# Patient Record
Sex: MALE | Race: WHITE | NOT HISPANIC OR LATINO | Employment: UNEMPLOYED | URBAN - NONMETROPOLITAN AREA
[De-identification: names, ages, dates, MRNs, and addresses within clinical notes are randomized per-mention and may not be internally consistent; named-entity substitution may affect disease eponyms.]

---

## 2017-02-06 RX ORDER — LISINOPRIL 5 MG/1
TABLET ORAL
Qty: 90 TABLET | Refills: 1 | Status: SHIPPED | OUTPATIENT
Start: 2017-02-06 | End: 2017-07-30 | Stop reason: SDUPTHER

## 2017-02-27 RX ORDER — SITAGLIPTIN 100 MG/1
TABLET, FILM COATED ORAL
Qty: 90 TABLET | Refills: 0 | Status: SHIPPED | OUTPATIENT
Start: 2017-02-27 | End: 2017-06-30 | Stop reason: SDUPTHER

## 2017-05-22 ENCOUNTER — OFFICE VISIT (OUTPATIENT)
Dept: FAMILY MEDICINE CLINIC | Facility: CLINIC | Age: 62
End: 2017-05-22

## 2017-05-22 VITALS
OXYGEN SATURATION: 97 % | HEART RATE: 67 BPM | DIASTOLIC BLOOD PRESSURE: 80 MMHG | TEMPERATURE: 97.3 F | WEIGHT: 265.2 LBS | BODY MASS INDEX: 37.97 KG/M2 | HEIGHT: 70 IN | SYSTOLIC BLOOD PRESSURE: 152 MMHG

## 2017-05-22 DIAGNOSIS — R10.32 LEFT LOWER QUADRANT PAIN: Primary | ICD-10-CM

## 2017-05-22 DIAGNOSIS — R35.0 FREQUENCY OF URINATION: ICD-10-CM

## 2017-05-22 DIAGNOSIS — E11.8 TYPE 2 DIABETES MELLITUS WITH COMPLICATION, WITHOUT LONG-TERM CURRENT USE OF INSULIN (HCC): ICD-10-CM

## 2017-05-22 DIAGNOSIS — I10 ESSENTIAL HYPERTENSION: ICD-10-CM

## 2017-05-22 LAB
BILIRUB BLD-MCNC: NEGATIVE MG/DL
CLARITY, POC: CLEAR
COLOR UR: YELLOW
GLUCOSE UR STRIP-MCNC: ABNORMAL MG/DL
KETONES UR QL: ABNORMAL
LEUKOCYTE EST, POC: NEGATIVE
NITRITE UR-MCNC: NEGATIVE MG/ML
PH UR: 5 [PH] (ref 5–8)
PROT UR STRIP-MCNC: ABNORMAL MG/DL
RBC # UR STRIP: NEGATIVE /UL
SP GR UR: 1.03 (ref 1–1.03)
UROBILINOGEN UR QL: NORMAL

## 2017-05-22 PROCEDURE — 81002 URINALYSIS NONAUTO W/O SCOPE: CPT | Performed by: FAMILY MEDICINE

## 2017-05-22 PROCEDURE — 90471 IMMUNIZATION ADMIN: CPT | Performed by: FAMILY MEDICINE

## 2017-05-22 PROCEDURE — 36415 COLL VENOUS BLD VENIPUNCTURE: CPT | Performed by: FAMILY MEDICINE

## 2017-05-22 PROCEDURE — 90715 TDAP VACCINE 7 YRS/> IM: CPT | Performed by: FAMILY MEDICINE

## 2017-05-22 PROCEDURE — 83036 HEMOGLOBIN GLYCOSYLATED A1C: CPT | Performed by: FAMILY MEDICINE

## 2017-05-22 PROCEDURE — 99214 OFFICE O/P EST MOD 30 MIN: CPT | Performed by: FAMILY MEDICINE

## 2017-05-23 LAB — HBA1C MFR BLD: 6.87 % (ref 4–5.6)

## 2017-05-30 ENCOUNTER — OFFICE VISIT (OUTPATIENT)
Dept: FAMILY MEDICINE CLINIC | Facility: CLINIC | Age: 62
End: 2017-05-30

## 2017-05-30 VITALS
TEMPERATURE: 98.3 F | HEIGHT: 70 IN | DIASTOLIC BLOOD PRESSURE: 64 MMHG | OXYGEN SATURATION: 98 % | HEART RATE: 51 BPM | WEIGHT: 265.2 LBS | SYSTOLIC BLOOD PRESSURE: 118 MMHG | BODY MASS INDEX: 37.97 KG/M2

## 2017-05-30 DIAGNOSIS — R81 GLUCOSURIA: ICD-10-CM

## 2017-05-30 DIAGNOSIS — E11.9 CONTROLLED TYPE 2 DIABETES MELLITUS WITHOUT COMPLICATION, WITHOUT LONG-TERM CURRENT USE OF INSULIN (HCC): Primary | ICD-10-CM

## 2017-05-30 DIAGNOSIS — R14.0 ABDOMINAL BLOATING: ICD-10-CM

## 2017-05-30 DIAGNOSIS — R81 GLUCOSURIA: Primary | ICD-10-CM

## 2017-05-30 DIAGNOSIS — R81 GLUCOSE FOUND IN URINE ON EXAMINATION: ICD-10-CM

## 2017-05-30 DIAGNOSIS — E13.9: Primary | ICD-10-CM

## 2017-05-30 LAB
BILIRUB BLD-MCNC: NEGATIVE MG/DL
CLARITY, POC: CLEAR
COLOR UR: YELLOW
GLUCOSE UR STRIP-MCNC: NEGATIVE MG/DL
KETONES UR QL: NEGATIVE
LEUKOCYTE EST, POC: NEGATIVE
NITRITE UR-MCNC: NEGATIVE MG/ML
PH UR: 5 [PH] (ref 5–8)
PROT UR STRIP-MCNC: NEGATIVE MG/DL
RBC # UR STRIP: NEGATIVE /UL
SP GR UR: 1.03 (ref 1–1.03)
UROBILINOGEN UR QL: NORMAL

## 2017-05-30 PROCEDURE — 81002 URINALYSIS NONAUTO W/O SCOPE: CPT | Performed by: FAMILY MEDICINE

## 2017-05-30 PROCEDURE — 99213 OFFICE O/P EST LOW 20 MIN: CPT | Performed by: FAMILY MEDICINE

## 2017-06-19 RX ORDER — LEVOTHYROXINE SODIUM 0.12 MG/1
TABLET ORAL
Qty: 30 TABLET | Refills: 0 | Status: SHIPPED | OUTPATIENT
Start: 2017-06-19 | End: 2017-11-13 | Stop reason: SDUPTHER

## 2017-07-05 RX ORDER — SITAGLIPTIN 100 MG/1
TABLET, FILM COATED ORAL
Qty: 90 TABLET | Refills: 1 | Status: SHIPPED | OUTPATIENT
Start: 2017-07-05 | End: 2017-11-13 | Stop reason: SDUPTHER

## 2017-07-19 RX ORDER — LEVOTHYROXINE SODIUM 0.12 MG/1
TABLET ORAL
Qty: 30 TABLET | Refills: 0 | OUTPATIENT
Start: 2017-07-19

## 2017-07-24 RX ORDER — LEVOTHYROXINE SODIUM 0.12 MG/1
TABLET ORAL
Qty: 30 TABLET | Refills: 0 | Status: SHIPPED | OUTPATIENT
Start: 2017-07-24 | End: 2017-08-20 | Stop reason: SDUPTHER

## 2017-07-31 RX ORDER — LISINOPRIL 5 MG/1
TABLET ORAL
Qty: 90 TABLET | Refills: 0 | Status: SHIPPED | OUTPATIENT
Start: 2017-07-31 | End: 2017-08-28 | Stop reason: DRUGHIGH

## 2017-08-22 RX ORDER — LEVOTHYROXINE SODIUM 0.12 MG/1
TABLET ORAL
Qty: 30 TABLET | Refills: 0 | Status: SHIPPED | OUTPATIENT
Start: 2017-08-22 | End: 2017-09-18 | Stop reason: SDUPTHER

## 2017-08-28 RX ORDER — LISINOPRIL 20 MG/1
20 TABLET ORAL DAILY
Qty: 90 TABLET | Refills: 3 | Status: SHIPPED | OUTPATIENT
Start: 2017-08-28 | End: 2018-04-19 | Stop reason: ALTCHOICE

## 2017-09-13 RX ORDER — OMEPRAZOLE 20 MG/1
20 CAPSULE, DELAYED RELEASE ORAL DAILY
Qty: 30 CAPSULE | Refills: 11 | Status: SHIPPED | OUTPATIENT
Start: 2017-09-13 | End: 2018-11-29 | Stop reason: ALTCHOICE

## 2017-09-18 DIAGNOSIS — E03.9 ACQUIRED HYPOTHYROIDISM: Primary | ICD-10-CM

## 2017-09-18 RX ORDER — LEVOTHYROXINE SODIUM 0.12 MG/1
TABLET ORAL
Qty: 30 TABLET | Refills: 0 | Status: SHIPPED | OUTPATIENT
Start: 2017-09-18 | End: 2017-10-31 | Stop reason: SDUPTHER

## 2017-10-11 ENCOUNTER — FLU SHOT (OUTPATIENT)
Dept: FAMILY MEDICINE CLINIC | Facility: CLINIC | Age: 62
End: 2017-10-11

## 2017-10-11 PROCEDURE — 90471 IMMUNIZATION ADMIN: CPT | Performed by: FAMILY MEDICINE

## 2017-10-11 PROCEDURE — 90686 IIV4 VACC NO PRSV 0.5 ML IM: CPT | Performed by: FAMILY MEDICINE

## 2017-10-31 RX ORDER — LEVOTHYROXINE SODIUM 0.12 MG/1
TABLET ORAL
Qty: 30 TABLET | Refills: 0 | Status: SHIPPED | OUTPATIENT
Start: 2017-10-31 | End: 2017-11-14 | Stop reason: DRUGHIGH

## 2017-11-06 ENCOUNTER — TELEPHONE (OUTPATIENT)
Dept: FAMILY MEDICINE CLINIC | Facility: CLINIC | Age: 62
End: 2017-11-06

## 2017-11-06 NOTE — TELEPHONE ENCOUNTER
PATIENT CALLED.    JANUVIA ISN'T HELPING, SUGAR RUNS HIGHAFTER MEALS. 200+    CAN HE TAKE 2 OF THE JANUVIA??      [facility].

## 2017-11-13 ENCOUNTER — OFFICE VISIT (OUTPATIENT)
Dept: FAMILY MEDICINE CLINIC | Facility: CLINIC | Age: 62
End: 2017-11-13

## 2017-11-13 VITALS
HEIGHT: 70 IN | TEMPERATURE: 97.4 F | DIASTOLIC BLOOD PRESSURE: 76 MMHG | WEIGHT: 269.8 LBS | BODY MASS INDEX: 38.63 KG/M2 | HEART RATE: 69 BPM | OXYGEN SATURATION: 97 % | SYSTOLIC BLOOD PRESSURE: 130 MMHG

## 2017-11-13 DIAGNOSIS — Z12.5 ENCOUNTER FOR PROSTATE CANCER SCREENING: ICD-10-CM

## 2017-11-13 DIAGNOSIS — I10 ESSENTIAL HYPERTENSION: Primary | ICD-10-CM

## 2017-11-13 DIAGNOSIS — E03.9 ACQUIRED HYPOTHYROIDISM: ICD-10-CM

## 2017-11-13 DIAGNOSIS — E11.8 TYPE 2 DIABETES MELLITUS WITH COMPLICATION, WITHOUT LONG-TERM CURRENT USE OF INSULIN (HCC): ICD-10-CM

## 2017-11-13 LAB
ALBUMIN SERPL-MCNC: 4.5 G/DL (ref 3.4–4.8)
ALBUMIN/GLOB SERPL: 1.4 G/DL (ref 1.1–1.8)
ALP SERPL-CCNC: 40 U/L (ref 38–126)
ALT SERPL W P-5'-P-CCNC: 80 U/L (ref 21–72)
ANION GAP SERPL CALCULATED.3IONS-SCNC: 14 MMOL/L (ref 5–15)
ARTICHOKE IGE QN: 222 MG/DL (ref 1–129)
AST SERPL-CCNC: 47 U/L (ref 17–59)
BILIRUB SERPL-MCNC: 0.6 MG/DL (ref 0.2–1.3)
BUN BLD-MCNC: 14 MG/DL (ref 7–21)
BUN/CREAT SERPL: 14.9 (ref 7–25)
CALCIUM SPEC-SCNC: 9.7 MG/DL (ref 8.4–10.2)
CHLORIDE SERPL-SCNC: 96 MMOL/L (ref 95–110)
CHOLEST SERPL-MCNC: 271 MG/DL (ref 0–199)
CO2 SERPL-SCNC: 27 MMOL/L (ref 22–31)
CREAT BLD-MCNC: 0.94 MG/DL (ref 0.7–1.3)
DEPRECATED RDW RBC AUTO: 41.4 FL (ref 35.1–43.9)
ERYTHROCYTE [DISTWIDTH] IN BLOOD BY AUTOMATED COUNT: 13.7 % (ref 11.5–14.5)
GFR SERPL CREATININE-BSD FRML MDRD: 82 ML/MIN/1.73 (ref 49–113)
GLOBULIN UR ELPH-MCNC: 3.2 GM/DL (ref 2.3–3.5)
GLUCOSE BLD-MCNC: 148 MG/DL (ref 60–100)
HBA1C MFR BLD: 7.1 % (ref 4–5.6)
HCT VFR BLD AUTO: 46.6 % (ref 39–49)
HDLC SERPL-MCNC: 39 MG/DL (ref 60–200)
HGB BLD-MCNC: 16.2 G/DL (ref 13.7–17.3)
LDLC/HDLC SERPL: 4.62 {RATIO} (ref 0–3.55)
MCH RBC QN AUTO: 28.8 PG (ref 26.5–34)
MCHC RBC AUTO-ENTMCNC: 34.8 G/DL (ref 31.5–36.3)
MCV RBC AUTO: 82.9 FL (ref 80–98)
PLATELET # BLD AUTO: 165 10*3/MM3 (ref 150–450)
PMV BLD AUTO: 11.7 FL (ref 8–12)
POTASSIUM BLD-SCNC: 4.5 MMOL/L (ref 3.5–5.1)
PROT SERPL-MCNC: 7.7 G/DL (ref 6.3–8.6)
PSA SERPL-MCNC: 0.46 NG/ML (ref 0–4)
RBC # BLD AUTO: 5.62 10*6/MM3 (ref 4.37–5.74)
SODIUM BLD-SCNC: 137 MMOL/L (ref 137–145)
TRIGL SERPL-MCNC: 259 MG/DL (ref 20–199)
TSH SERPL DL<=0.05 MIU/L-ACNC: 23.7 MIU/ML (ref 0.46–4.68)
WBC NRBC COR # BLD: 6.42 10*3/MM3 (ref 3.2–9.8)

## 2017-11-13 PROCEDURE — 80061 LIPID PANEL: CPT | Performed by: FAMILY MEDICINE

## 2017-11-13 PROCEDURE — 99213 OFFICE O/P EST LOW 20 MIN: CPT | Performed by: FAMILY MEDICINE

## 2017-11-13 PROCEDURE — G0103 PSA SCREENING: HCPCS | Performed by: FAMILY MEDICINE

## 2017-11-13 PROCEDURE — 85027 COMPLETE CBC AUTOMATED: CPT | Performed by: FAMILY MEDICINE

## 2017-11-13 PROCEDURE — 84443 ASSAY THYROID STIM HORMONE: CPT | Performed by: FAMILY MEDICINE

## 2017-11-13 PROCEDURE — 80053 COMPREHEN METABOLIC PANEL: CPT | Performed by: FAMILY MEDICINE

## 2017-11-13 PROCEDURE — 83036 HEMOGLOBIN GLYCOSYLATED A1C: CPT | Performed by: FAMILY MEDICINE

## 2017-11-13 RX ORDER — PIOGLITAZONEHYDROCHLORIDE 15 MG/1
15 TABLET ORAL DAILY
Qty: 90 TABLET | Refills: 0 | Status: SHIPPED | OUTPATIENT
Start: 2017-11-13 | End: 2018-01-24 | Stop reason: ALTCHOICE

## 2017-11-13 RX ORDER — GLIMEPIRIDE 2 MG/1
2 TABLET ORAL
Qty: 90 TABLET | Refills: 0 | Status: SHIPPED | OUTPATIENT
Start: 2017-11-13 | End: 2018-01-24 | Stop reason: ALTCHOICE

## 2017-11-13 NOTE — PROGRESS NOTES
Subjective   Pablo Pina is a 61 y.o. male.     History of Present Illness     For some reason, not taking amaryl or actos.  Only taking januvia.  Eating whatever he wants.  He craves food all the time.   Blood sugars above 140 and 200's also    Review of Systems   Constitutional: Negative for chills, fatigue and fever.   HENT: Negative for congestion, ear discharge, ear pain, facial swelling, hearing loss, postnasal drip, rhinorrhea, sinus pressure, sore throat, trouble swallowing and voice change.    Eyes: Negative for discharge, redness and visual disturbance.   Respiratory: Negative for cough, chest tightness, shortness of breath and wheezing.    Cardiovascular: Negative for chest pain and palpitations.   Gastrointestinal: Negative for abdominal pain, blood in stool, constipation, diarrhea, nausea and vomiting.   Endocrine: Negative for polydipsia and polyuria.   Genitourinary: Negative for dysuria, flank pain, hematuria and urgency.   Musculoskeletal: Negative for arthralgias, back pain, joint swelling and myalgias.   Skin: Negative for rash.   Neurological: Negative for dizziness, weakness, numbness and headaches.   Hematological: Negative for adenopathy.   Psychiatric/Behavioral: Negative for confusion and sleep disturbance. The patient is not nervous/anxious.        Objective   Physical Exam   Constitutional: He is oriented to person, place, and time. He appears well-developed and well-nourished.   HENT:   Head: Normocephalic and atraumatic.   Right Ear: External ear normal.   Left Ear: External ear normal.   Nose: Nose normal.   Eyes: Conjunctivae and EOM are normal. Pupils are equal, round, and reactive to light.   Neck: Normal range of motion.   Cardiovascular: Normal rate, regular rhythm and normal heart sounds.    Pulmonary/Chest: Effort normal and breath sounds normal.   Abdominal: Soft.   Musculoskeletal: Normal range of motion.   Neurological: He is alert and oriented to person, place, and  time.   Psychiatric: He has a normal mood and affect. His behavior is normal. Judgment and thought content normal.   Nursing note and vitals reviewed.      Assessment/Plan   Pablo was seen today for discuss diabetic meds.    Diagnoses and all orders for this visit:    Essential hypertension  -     CBC (No Diff)  -     Comprehensive Metabolic Panel  -     Lipid Panel    Acquired hypothyroidism  -     TSH    Type 2 diabetes mellitus with complication, without long-term current use of insulin  -     Hemoglobin A1c    Encounter for prostate cancer screening  -     PSA Screen    Other orders  -     pioglitazone (ACTOS) 15 MG tablet; Take 1 tablet by mouth Daily.  -     glimepiride (AMARYL) 2 MG tablet; Take 1 tablet by mouth Every Morning Before Breakfast.  -     SITagliptin (JANUVIA) 100 MG tablet; Take 1 tablet by mouth Daily.        Restarted actos and amaryl.  Discussed diet    Return 3 months.

## 2017-11-14 DIAGNOSIS — E03.9 ACQUIRED HYPOTHYROIDISM: Primary | ICD-10-CM

## 2017-11-14 RX ORDER — NITROGLYCERIN 0.4 MG/1
TABLET SUBLINGUAL
Qty: 25 TABLET | Refills: 11 | Status: SHIPPED | OUTPATIENT
Start: 2017-11-14 | End: 2018-05-22 | Stop reason: SDUPTHER

## 2017-11-14 RX ORDER — LEVOTHYROXINE SODIUM 175 UG/1
175 TABLET ORAL DAILY
Qty: 30 TABLET | Refills: 1 | Status: SHIPPED | OUTPATIENT
Start: 2017-11-14 | End: 2018-03-17 | Stop reason: SDUPTHER

## 2017-11-14 NOTE — TELEPHONE ENCOUNTER
LEVOTHYROXINE IS THE SAME AS IN THE COMPUTER. 125 MCG. HE TAKES ONE EVERY DAY. DENIES MISSING ANY BEFORE LABS.    NEED NEW RX FOR LEVOTHYROXINE. I TOLD HIM NEEDS LAB IN 6 WEEKS AFTER STARTING NEW DOSE.    HE ALSO NEEDS REFILL NITRO PILLS    [facility].

## 2017-11-27 ENCOUNTER — TELEPHONE (OUTPATIENT)
Dept: FAMILY MEDICINE CLINIC | Facility: CLINIC | Age: 62
End: 2017-11-27

## 2017-12-05 ENCOUNTER — TELEPHONE (OUTPATIENT)
Dept: FAMILY MEDICINE CLINIC | Facility: CLINIC | Age: 62
End: 2017-12-05

## 2017-12-05 RX ORDER — BUPROPION HYDROCHLORIDE 100 MG/1
100 TABLET, EXTENDED RELEASE ORAL 2 TIMES DAILY
Qty: 60 TABLET | Refills: 5 | Status: SHIPPED | OUTPATIENT
Start: 2017-12-05 | End: 2018-05-22 | Stop reason: SINTOL

## 2017-12-05 NOTE — TELEPHONE ENCOUNTER
PATIENT WANTS TO KNOW IF THERE IS ANYTHING YOU CAN GIVE HIM TO SUPPRESS HIS APPETITE.    HE WANTS TO LOSE WEIGHT & GET HIS GLUCOSE UNDER CONTROL.    [facility]

## 2017-12-06 ENCOUNTER — TELEPHONE (OUTPATIENT)
Dept: FAMILY MEDICINE CLINIC | Facility: CLINIC | Age: 62
End: 2017-12-06

## 2017-12-06 NOTE — TELEPHONE ENCOUNTER
PATIENT SAID PHARMACY CAN'T TELL HIM HOW MUCH BELVIQ WOULD BE UNTIL THEY HAD A PRESCRIPTION TO RUN THROUGH.  HE WOULD LIKE YOU TO SENT RX TO [facility].

## 2017-12-07 ENCOUNTER — PRIOR AUTHORIZATION (OUTPATIENT)
Dept: FAMILY MEDICINE CLINIC | Facility: CLINIC | Age: 62
End: 2017-12-07

## 2017-12-11 RX ORDER — LEVOTHYROXINE SODIUM 0.12 MG/1
TABLET ORAL
Qty: 30 TABLET | Refills: 0 | OUTPATIENT
Start: 2017-12-11

## 2017-12-11 NOTE — TELEPHONE ENCOUNTER
He was changed from 125mcg qd to 175mcg qd with understanding of tsh in 6 weeks.  Why are they requesting 125mcg? Is he due for tsh?

## 2017-12-12 NOTE — TELEPHONE ENCOUNTER
Called [facility] and they said he is taking the Levothyroxine 175mcg. The 125 was on an autofill so they canceled that script.

## 2017-12-27 ENCOUNTER — TELEPHONE (OUTPATIENT)
Dept: FAMILY MEDICINE CLINIC | Facility: CLINIC | Age: 62
End: 2017-12-27

## 2017-12-27 NOTE — TELEPHONE ENCOUNTER
Blood sugar still running too high.    153 this morning before eating.    Often in the 240's.    What would you suggest?    [facility]      [employer] ins has novolog flex pen & humalog flex pen & levemir flex on formulary per patient.

## 2018-01-02 ENCOUNTER — OFFICE VISIT (OUTPATIENT)
Dept: FAMILY MEDICINE CLINIC | Facility: CLINIC | Age: 63
End: 2018-01-02

## 2018-01-02 VITALS
OXYGEN SATURATION: 96 % | HEIGHT: 70 IN | DIASTOLIC BLOOD PRESSURE: 78 MMHG | TEMPERATURE: 99.2 F | SYSTOLIC BLOOD PRESSURE: 140 MMHG | WEIGHT: 271 LBS | BODY MASS INDEX: 38.8 KG/M2 | HEART RATE: 61 BPM

## 2018-01-02 DIAGNOSIS — E11.8 TYPE 2 DIABETES MELLITUS WITH COMPLICATION, WITH LONG-TERM CURRENT USE OF INSULIN (HCC): Primary | ICD-10-CM

## 2018-01-02 DIAGNOSIS — Z79.4 TYPE 2 DIABETES MELLITUS WITH COMPLICATION, WITH LONG-TERM CURRENT USE OF INSULIN (HCC): Primary | ICD-10-CM

## 2018-01-02 PROCEDURE — 99214 OFFICE O/P EST MOD 30 MIN: CPT | Performed by: FAMILY MEDICINE

## 2018-01-02 RX ORDER — LISINOPRIL 5 MG/1
TABLET ORAL
Refills: 0 | COMMUNITY
Start: 2017-12-11 | End: 2018-01-10 | Stop reason: SDUPTHER

## 2018-01-02 RX ORDER — LATANOPROST 50 UG/ML
SOLUTION/ DROPS OPHTHALMIC
Refills: 6 | COMMUNITY
Start: 2017-12-12 | End: 2022-04-18

## 2018-01-02 NOTE — PROGRESS NOTES
Subjective   Pablo Pina is a 62 y.o. male.     History of Present Illness     Blood sugars out of control. He has been researching insulin his insurance company will pay for.  They will pay for levemir flex pin.  He has difficulty seeing his insulin due to his glaucoma, etc.  fsbs 181 this am and 220-240's during the day.    He is thinking about watching his diet now.  When he doesn't eat much, his sugars are better.  He wants the shortest needles he can get.     Review of Systems   Constitutional: Negative for chills, fatigue and fever.   HENT: Negative for congestion, ear discharge, ear pain, facial swelling, hearing loss, postnasal drip, rhinorrhea, sinus pressure, sore throat, trouble swallowing and voice change.    Eyes: Negative for discharge, redness and visual disturbance.   Respiratory: Negative for cough, chest tightness, shortness of breath and wheezing.    Cardiovascular: Negative for chest pain and palpitations.   Gastrointestinal: Negative for abdominal pain, blood in stool, constipation, diarrhea, nausea and vomiting.   Endocrine: Negative for polydipsia and polyuria.   Genitourinary: Negative for dysuria, flank pain, hematuria and urgency.   Musculoskeletal: Negative for arthralgias, back pain, joint swelling and myalgias.   Skin: Negative for rash.   Neurological: Negative for dizziness, weakness, numbness and headaches.   Hematological: Negative for adenopathy.   Psychiatric/Behavioral: Negative for confusion and sleep disturbance. The patient is not nervous/anxious.        Objective   Physical Exam   Constitutional: He is oriented to person, place, and time. He appears well-developed and well-nourished.   HENT:   Head: Normocephalic and atraumatic.   Right Ear: External ear normal.   Left Ear: External ear normal.   Nose: Nose normal.   Eyes: Conjunctivae and EOM are normal. Pupils are equal, round, and reactive to light.   Neck: Normal range of motion.   Pulmonary/Chest: Effort normal.    Musculoskeletal: Normal range of motion.   Neurological: He is alert and oriented to person, place, and time.   Psychiatric: He has a normal mood and affect. His behavior is normal. Judgment and thought content normal.   Nursing note and vitals reviewed.      Assessment/Plan   Pablo was seen today for diabetes.    Diagnoses and all orders for this visit:    Type 2 diabetes mellitus with complication, with long-term current use of insulin    Other orders  -     insulin detemir (LEVEMIR) 100 UNIT/ML injection; Inject 10 Units under the skin Every Night.  -     glucose blood (ONE TOUCH ULTRA TEST) test strip; 1 each by Other route 3 (Three) Times a Day As Needed (SUGAR). Use as instructed      Went over many examples until he understood.  Start with 10 units at bedtime and adjust.   Call if any problems, don't wait for 3 months.

## 2018-01-03 RX ORDER — INSULIN GLARGINE 100 [IU]/ML
10 INJECTION, SOLUTION SUBCUTANEOUS NIGHTLY
Qty: 15 ML | Refills: 2 | Status: SHIPPED | OUTPATIENT
Start: 2018-01-03 | End: 2018-01-24 | Stop reason: ALTCHOICE

## 2018-01-08 ENCOUNTER — TELEPHONE (OUTPATIENT)
Dept: FAMILY MEDICINE CLINIC | Facility: CLINIC | Age: 63
End: 2018-01-08

## 2018-01-08 RX ORDER — LISINOPRIL 5 MG/1
TABLET ORAL
Qty: 90 TABLET | Refills: 3 | Status: SHIPPED | OUTPATIENT
Start: 2018-01-08 | End: 2018-10-10 | Stop reason: HOSPADM

## 2018-01-08 NOTE — TELEPHONE ENCOUNTER
PATIENT SAYS HE IS USING 16 UNITS OF INSULIN AT NIGHT AND SUGAR GOES DOWN, BUT COMES BACK UP WHEN HE EATS.  SHOULD HE USE IT MORE OFTEN?

## 2018-01-10 ENCOUNTER — OFFICE VISIT (OUTPATIENT)
Dept: FAMILY MEDICINE CLINIC | Facility: CLINIC | Age: 63
End: 2018-01-10

## 2018-01-10 VITALS
WEIGHT: 272.6 LBS | BODY MASS INDEX: 39.03 KG/M2 | TEMPERATURE: 98.7 F | DIASTOLIC BLOOD PRESSURE: 80 MMHG | OXYGEN SATURATION: 97 % | HEART RATE: 71 BPM | SYSTOLIC BLOOD PRESSURE: 140 MMHG | HEIGHT: 70 IN

## 2018-01-10 DIAGNOSIS — E11.8 TYPE 2 DIABETES MELLITUS WITH COMPLICATION, WITH LONG-TERM CURRENT USE OF INSULIN (HCC): Primary | ICD-10-CM

## 2018-01-10 DIAGNOSIS — Z79.4 TYPE 2 DIABETES MELLITUS WITH COMPLICATION, WITH LONG-TERM CURRENT USE OF INSULIN (HCC): Primary | ICD-10-CM

## 2018-01-10 PROCEDURE — 99213 OFFICE O/P EST LOW 20 MIN: CPT | Performed by: FAMILY MEDICINE

## 2018-01-10 NOTE — PROGRESS NOTES
Subjective   Pablo Pina is a 62 y.o. male.     History of Present Illness     He is afraid his blood sugars will drop during the night and kill him  He is checking his fsbs 5-6 times a day.  Lowest was 105.  He is not increasing his insulin as discussed with sliding scale  He will feel funny and check his fsbs    Review of Systems   Constitutional: Negative for chills, fatigue and fever.   HENT: Negative for congestion, ear discharge, ear pain, facial swelling, hearing loss, postnasal drip, rhinorrhea, sinus pressure, sore throat, trouble swallowing and voice change.    Eyes: Negative for discharge, redness and visual disturbance.   Respiratory: Negative for cough, chest tightness, shortness of breath and wheezing.    Cardiovascular: Negative for chest pain and palpitations.   Gastrointestinal: Negative for abdominal pain, blood in stool, constipation, diarrhea, nausea and vomiting.   Endocrine: Negative for polydipsia and polyuria.   Genitourinary: Negative for dysuria, flank pain, hematuria and urgency.   Musculoskeletal: Negative for arthralgias, back pain, joint swelling and myalgias.   Skin: Negative for rash.   Neurological: Negative for dizziness, weakness, numbness and headaches.   Hematological: Negative for adenopathy.   Psychiatric/Behavioral: Negative for confusion and sleep disturbance. The patient is not nervous/anxious.        Objective   Physical Exam   Constitutional: He is oriented to person, place, and time. He appears well-developed and well-nourished.   HENT:   Head: Normocephalic and atraumatic.   Right Ear: External ear normal.   Left Ear: External ear normal.   Nose: Nose normal.   Eyes: Conjunctivae and EOM are normal. Pupils are equal, round, and reactive to light.   Neck: Normal range of motion.   Pulmonary/Chest: Effort normal.   Musculoskeletal: Normal range of motion.   Neurological: He is alert and oriented to person, place, and time.   Psychiatric: He has a normal mood and  affect. His behavior is normal. Judgment and thought content normal.   Nursing note and vitals reviewed.      Assessment/Plan   Pablo was seen today for follow-up.    Diagnoses and all orders for this visit:    Type 2 diabetes mellitus with complication, with long-term current use of insulin      Went over several examples to ease his mind.  He may check fsbs at bedtime and give himself his insulin in the morning when he wakes up.  This way he wont worry about his fsbs dropping during the night and killing him  Just do one fsbs during the day, check if feels funny but save his fingers.  Shown how he is in no danger at all and needs to increase insulin more  If gets to 50 units call and will have to switch to bid regimen and stop amaryl.   Feeling funny because brain not use to lower blood sugars.   Many still over 200.

## 2018-01-15 ENCOUNTER — OFFICE VISIT (OUTPATIENT)
Dept: FAMILY MEDICINE CLINIC | Facility: CLINIC | Age: 63
End: 2018-01-15

## 2018-01-15 VITALS
BODY MASS INDEX: 39.31 KG/M2 | WEIGHT: 274.6 LBS | OXYGEN SATURATION: 98 % | SYSTOLIC BLOOD PRESSURE: 150 MMHG | DIASTOLIC BLOOD PRESSURE: 72 MMHG | HEART RATE: 69 BPM | HEIGHT: 70 IN | TEMPERATURE: 99 F

## 2018-01-15 DIAGNOSIS — Z79.4 TYPE 2 DIABETES MELLITUS WITH COMPLICATION, WITH LONG-TERM CURRENT USE OF INSULIN (HCC): Primary | ICD-10-CM

## 2018-01-15 DIAGNOSIS — E11.8 TYPE 2 DIABETES MELLITUS WITH COMPLICATION, WITH LONG-TERM CURRENT USE OF INSULIN (HCC): Primary | ICD-10-CM

## 2018-01-15 PROCEDURE — 99213 OFFICE O/P EST LOW 20 MIN: CPT | Performed by: FAMILY MEDICINE

## 2018-01-15 RX ORDER — PEN NEEDLE, DIABETIC 31 GX5/16"
NEEDLE, DISPOSABLE MISCELLANEOUS
Refills: 2 | COMMUNITY
Start: 2018-01-03 | End: 2019-07-29 | Stop reason: SDUPTHER

## 2018-01-15 NOTE — PROGRESS NOTES
Subjective   Pablo Pina is a 62 y.o. male.     History of Present Illness     Now taking lantus in am based on bedtime fsbs.  Still feeling funny.  Up to 24 or 26 units qam.    Felt queasy this am after insulin.     Review of Systems   Constitutional: Negative for chills, fatigue and fever.   HENT: Negative for congestion, ear discharge, ear pain, facial swelling, hearing loss, postnasal drip, rhinorrhea, sinus pressure, sore throat, trouble swallowing and voice change.    Eyes: Negative for discharge, redness and visual disturbance.   Respiratory: Negative for cough, chest tightness, shortness of breath and wheezing.    Cardiovascular: Negative for chest pain and palpitations.   Gastrointestinal: Negative for abdominal pain, blood in stool, constipation, diarrhea, nausea and vomiting.   Endocrine: Negative for polydipsia and polyuria.   Genitourinary: Negative for dysuria, flank pain, hematuria and urgency.   Musculoskeletal: Negative for arthralgias, back pain, joint swelling and myalgias.   Skin: Negative for rash.   Neurological: Negative for dizziness, weakness, numbness and headaches.   Hematological: Negative for adenopathy.   Psychiatric/Behavioral: Negative for confusion and sleep disturbance. The patient is not nervous/anxious.        Objective   Physical Exam   Constitutional: He is oriented to person, place, and time. He appears well-developed and well-nourished.   HENT:   Head: Normocephalic and atraumatic.   Right Ear: External ear normal.   Left Ear: External ear normal.   Nose: Nose normal.   Eyes: Conjunctivae and EOM are normal. Pupils are equal, round, and reactive to light.   Neck: Normal range of motion.   Pulmonary/Chest: Effort normal.   Musculoskeletal: Normal range of motion.   Neurological: He is alert and oriented to person, place, and time.   Psychiatric: He has a normal mood and affect. His behavior is normal. Judgment and thought content normal.   Nursing note and vitals  reviewed.      Assessment/Plan   Pablo was seen today for diabetes.    Diagnoses and all orders for this visit:    Type 2 diabetes mellitus with complication, with long-term current use of insulin    continue to go up on insulin.  fsbs many over 200.  Return 3 months.  Return sooner if gets to 50 units lantus.  I told him I expect he will need about this.

## 2018-01-22 ENCOUNTER — TELEPHONE (OUTPATIENT)
Dept: FAMILY MEDICINE CLINIC | Facility: CLINIC | Age: 63
End: 2018-01-22

## 2018-01-24 ENCOUNTER — OFFICE VISIT (OUTPATIENT)
Dept: FAMILY MEDICINE CLINIC | Facility: CLINIC | Age: 63
End: 2018-01-24

## 2018-01-24 VITALS
BODY MASS INDEX: 39.48 KG/M2 | TEMPERATURE: 98.7 F | HEART RATE: 77 BPM | OXYGEN SATURATION: 97 % | SYSTOLIC BLOOD PRESSURE: 150 MMHG | WEIGHT: 275.8 LBS | HEIGHT: 70 IN | DIASTOLIC BLOOD PRESSURE: 80 MMHG

## 2018-01-24 DIAGNOSIS — E11.8 TYPE 2 DIABETES MELLITUS WITH COMPLICATION, WITH LONG-TERM CURRENT USE OF INSULIN (HCC): Primary | ICD-10-CM

## 2018-01-24 DIAGNOSIS — Z79.4 TYPE 2 DIABETES MELLITUS WITH COMPLICATION, WITH LONG-TERM CURRENT USE OF INSULIN (HCC): Primary | ICD-10-CM

## 2018-01-24 PROCEDURE — 99213 OFFICE O/P EST LOW 20 MIN: CPT | Performed by: FAMILY MEDICINE

## 2018-01-24 RX ORDER — PIOGLITAZONEHYDROCHLORIDE 15 MG/1
15 TABLET ORAL DAILY
Qty: 90 TABLET | Refills: 0
Start: 2018-01-24 | End: 2018-04-19 | Stop reason: SINTOL

## 2018-01-25 NOTE — PROGRESS NOTES
Subjective   Pablo Pina is a 62 y.o. male.     History of Present Illness     At about 50 units of basaglar daily.  fsbs many still over 200 but overall better.    Review of Systems   Constitutional: Negative for chills, fatigue and fever.   HENT: Negative for congestion, ear discharge, ear pain, facial swelling, hearing loss, postnasal drip, rhinorrhea, sinus pressure, sore throat, trouble swallowing and voice change.    Eyes: Negative for discharge, redness and visual disturbance.   Respiratory: Negative for cough, chest tightness, shortness of breath and wheezing.    Cardiovascular: Negative for chest pain and palpitations.   Gastrointestinal: Negative for abdominal pain, blood in stool, constipation, diarrhea, nausea and vomiting.   Endocrine: Negative for polydipsia and polyuria.   Genitourinary: Negative for dysuria, flank pain, hematuria and urgency.   Musculoskeletal: Negative for arthralgias, back pain, joint swelling and myalgias.   Skin: Negative for rash.   Neurological: Negative for dizziness, weakness, numbness and headaches.   Hematological: Negative for adenopathy.   Psychiatric/Behavioral: Negative for confusion and sleep disturbance. The patient is not nervous/anxious.        Objective   Physical Exam   Constitutional: He is oriented to person, place, and time. He appears well-developed and well-nourished.   HENT:   Head: Normocephalic and atraumatic.   Right Ear: External ear normal.   Left Ear: External ear normal.   Nose: Nose normal.   Eyes: Conjunctivae and EOM are normal. Pupils are equal, round, and reactive to light.   Neck: Normal range of motion.   Pulmonary/Chest: Effort normal.   Musculoskeletal: Normal range of motion.   Neurological: He is alert and oriented to person, place, and time.   Psychiatric: He has a normal mood and affect. His behavior is normal. Judgment and thought content normal.   Nursing note and vitals reviewed.      Assessment/Plan   Pablo was seen today  for follow-up and diabetes.    Diagnoses and all orders for this visit:    Type 2 diabetes mellitus with complication, with long-term current use of insulin    Other orders  -     insulin aspart protamine & aspart (NOVOLOG) 70/30 100 unit/mL; Start 40u ac breakfast and 20u ac supper.  Increase as instructed. I expect 100 units per day.  -     pioglitazone (ACTOS) 15 MG tablet; Take 1 tablet by mouth Daily.    He likes the pens.  Will switch to 70/30 novolog.    All instructions written out and explained until he understood.  Stay on actos but stop amaryl and januvia  Start novolog 70/30.  Shown how to increase slowly.  Return with fsbs 1 week and ill go over and offer suggestions.    Explained insulin will make him want to eat more.

## 2018-01-29 ENCOUNTER — TELEPHONE (OUTPATIENT)
Dept: FAMILY MEDICINE CLINIC | Facility: CLINIC | Age: 63
End: 2018-01-29

## 2018-01-31 ENCOUNTER — TELEPHONE (OUTPATIENT)
Dept: FAMILY MEDICINE CLINIC | Facility: CLINIC | Age: 63
End: 2018-01-31

## 2018-01-31 RX ORDER — GLUCOSAMINE HCL/CHONDROITIN SU 500-400 MG
1 CAPSULE ORAL 2 TIMES DAILY
Qty: 100 EACH | Refills: 11 | Status: SHIPPED | OUTPATIENT
Start: 2018-01-31

## 2018-03-19 ENCOUNTER — TELEPHONE (OUTPATIENT)
Dept: FAMILY MEDICINE CLINIC | Facility: CLINIC | Age: 63
End: 2018-03-19

## 2018-03-19 RX ORDER — LEVOTHYROXINE SODIUM 175 UG/1
175 TABLET ORAL DAILY
Qty: 30 TABLET | Refills: 0 | Status: SHIPPED | OUTPATIENT
Start: 2018-03-19 | End: 2018-04-24 | Stop reason: DRUGHIGH

## 2018-04-11 RX ORDER — LEVOTHYROXINE SODIUM 175 UG/1
175 TABLET ORAL DAILY
Qty: 30 TABLET | Refills: 0 | OUTPATIENT
Start: 2018-04-11

## 2018-04-18 ENCOUNTER — TELEPHONE (OUTPATIENT)
Dept: FAMILY MEDICINE CLINIC | Facility: CLINIC | Age: 63
End: 2018-04-18

## 2018-04-18 NOTE — TELEPHONE ENCOUNTER
2 WATER PILLS THAT DR JAMISON HAD GIVEN HIM    FUROSIMIDE 20 MG QD    SPIRONOLACTONE 25 MG QD    HE HAD STOPPED TAKING THEM DUE TO HIS BP DROPPING TOO LOW. HE THINKS HE NEEDS TO BE ON A WATER PILL AGAIN.    WHAT SHOULD HE TAKE        [facility]

## 2018-04-19 ENCOUNTER — OFFICE VISIT (OUTPATIENT)
Dept: FAMILY MEDICINE CLINIC | Facility: CLINIC | Age: 63
End: 2018-04-19

## 2018-04-19 ENCOUNTER — TELEPHONE (OUTPATIENT)
Dept: FAMILY MEDICINE CLINIC | Facility: CLINIC | Age: 63
End: 2018-04-19

## 2018-04-19 VITALS
SYSTOLIC BLOOD PRESSURE: 132 MMHG | HEART RATE: 61 BPM | BODY MASS INDEX: 39.77 KG/M2 | OXYGEN SATURATION: 98 % | TEMPERATURE: 98.1 F | DIASTOLIC BLOOD PRESSURE: 68 MMHG | HEIGHT: 70 IN | WEIGHT: 277.8 LBS

## 2018-04-19 DIAGNOSIS — I25.10 CORONARY ARTERIOSCLEROSIS: ICD-10-CM

## 2018-04-19 DIAGNOSIS — R07.9 CHEST PAIN, UNSPECIFIED TYPE: Primary | ICD-10-CM

## 2018-04-19 DIAGNOSIS — R60.0 EDEMA OF BOTH LEGS: ICD-10-CM

## 2018-04-19 DIAGNOSIS — E03.9 ACQUIRED HYPOTHYROIDISM: ICD-10-CM

## 2018-04-19 DIAGNOSIS — E11.8 TYPE 2 DIABETES MELLITUS WITH COMPLICATION, WITH LONG-TERM CURRENT USE OF INSULIN (HCC): ICD-10-CM

## 2018-04-19 DIAGNOSIS — Z79.4 TYPE 2 DIABETES MELLITUS WITH COMPLICATION, WITH LONG-TERM CURRENT USE OF INSULIN (HCC): ICD-10-CM

## 2018-04-19 PROCEDURE — 83036 HEMOGLOBIN GLYCOSYLATED A1C: CPT | Performed by: FAMILY MEDICINE

## 2018-04-19 PROCEDURE — 84443 ASSAY THYROID STIM HORMONE: CPT | Performed by: FAMILY MEDICINE

## 2018-04-19 PROCEDURE — 93005 ELECTROCARDIOGRAM TRACING: CPT | Performed by: FAMILY MEDICINE

## 2018-04-19 PROCEDURE — 93010 ELECTROCARDIOGRAM REPORT: CPT | Performed by: INTERNAL MEDICINE

## 2018-04-19 PROCEDURE — 99214 OFFICE O/P EST MOD 30 MIN: CPT | Performed by: FAMILY MEDICINE

## 2018-04-19 NOTE — PROGRESS NOTES
" Subjective   Pablo Pina is a 62 y.o. male.     History of Present Illness   /68 (BP Location: Left arm, Patient Position: Sitting, Cuff Size: Adult)   Pulse 61   Temp 98.1 °F (36.7 °C) (Temporal Artery )   Ht 177.8 cm (70\")   Wt 126 kg (277 lb 12.8 oz)   SpO2 98%   BMI 39.86 kg/m²     2005 4 vessel cabg.  Dr barone is cardiologist.  Stress test 2016 normal per lizabeth's note in hospital recoreds.  Patient if very poor historian  He called wanting to know wanting to know what water pills to take?   Dr barone had given him furosemide 20mg and spironolactone 25mg.  Patient stopped them on his own because his bp was dropping.  He feels now like he has fluid build up.  He is sob with activity, feels like chest is heavy.  Gets sob with activity for months.  He didn't bring his medicines and cant tell me for sure what he is taking.  He admits he forgets to take his medicine.  His tsh was 23.700 11/13/17 and he never came to get tsh rechecked.    His hga1c was 7.1 on 11/13/17.  I have no recent information from dr barone and in past not fruitful endeavor trying to get information from his office.     Review of Systems   Constitutional: Negative for chills, fatigue and fever.   HENT: Negative for congestion, ear discharge, ear pain, facial swelling, hearing loss, postnasal drip, rhinorrhea, sinus pressure, sore throat, trouble swallowing and voice change.    Eyes: Negative for discharge, redness and visual disturbance.   Respiratory: Positive for chest tightness and shortness of breath. Negative for cough and wheezing.    Cardiovascular: Positive for chest pain. Negative for palpitations.   Gastrointestinal: Negative for abdominal pain, blood in stool, constipation, diarrhea, nausea and vomiting.   Endocrine: Negative for polydipsia and polyuria.   Genitourinary: Negative for dysuria, flank pain, hematuria and urgency.   Musculoskeletal: Negative for arthralgias, back pain, joint swelling and " myalgias.   Skin: Negative for rash.   Neurological: Negative for dizziness, weakness, numbness and headaches.   Hematological: Negative for adenopathy.   Psychiatric/Behavioral: Negative for confusion and sleep disturbance. The patient is not nervous/anxious.        Objective   Physical Exam   Constitutional: He is oriented to person, place, and time. He appears well-developed and well-nourished.   HENT:   Head: Normocephalic and atraumatic.   Right Ear: External ear normal.   Left Ear: External ear normal.   Nose: Nose normal.   Mouth/Throat: Oropharynx is clear and moist.   Eyes: Conjunctivae and EOM are normal. Pupils are equal, round, and reactive to light.   Neck: Normal range of motion. Neck supple.   Cardiovascular: Normal rate, regular rhythm and normal heart sounds.  Exam reveals no gallop and no friction rub.    No murmur heard.  Pulmonary/Chest: Effort normal and breath sounds normal.   Abdominal: Soft. Bowel sounds are normal. He exhibits no distension. There is no tenderness. There is no rebound and no guarding.   Musculoskeletal: Normal range of motion. He exhibits no edema or deformity.   Neurological: He is alert and oriented to person, place, and time. No cranial nerve deficit.   Skin: Skin is warm and dry. No rash noted. No erythema.   Trace to 1+ edema both lower legs.      Psychiatric: He has a normal mood and affect. His behavior is normal. Judgment and thought content normal.   Nursing note and vitals reviewed.      Assessment/Plan   Pablo was seen today for thyroid problem.    Diagnoses and all orders for this visit:    Chest pain, unspecified type  -     ECG 12 Lead  -     Ambulatory Referral to Cardiology    Acquired hypothyroidism  -     TSH    Type 2 diabetes mellitus with complication, with long-term current use of insulin  -     Hemoglobin A1c    Edema of both legs    Coronary arteriosclerosis  -     Ambulatory Referral to Cardiology    ekg right bundle  Will consult a cardiologist I  can communicate with in future regarding patient  Written instructions for patient.    Stop actos  Restart furosemide 20mg qd  Stay off aldactone  Patient changed himself from lisinopril 20mg qd to 5mg qd.  Stay on 5mg qd.    Warned if chest discomfort doesn't get better, go to er.

## 2018-04-20 LAB
HBA1C MFR BLD: 6.5 % (ref 4–5.6)
TSH SERPL DL<=0.05 MIU/L-ACNC: 0.12 MIU/ML (ref 0.46–4.68)

## 2018-04-24 ENCOUNTER — TELEPHONE (OUTPATIENT)
Dept: FAMILY MEDICINE CLINIC | Facility: CLINIC | Age: 63
End: 2018-04-24

## 2018-04-24 RX ORDER — LEVOTHYROXINE SODIUM 112 UG/1
112 TABLET ORAL DAILY
Qty: 30 TABLET | Refills: 1 | Status: SHIPPED | OUTPATIENT
Start: 2018-04-24 | End: 2018-06-15 | Stop reason: SDUPTHER

## 2018-04-24 NOTE — TELEPHONE ENCOUNTER
HERE'S THE STORY.  FOR THE LAST COUPLE OF WEEKS HE HAS BEEN TAKING AN OLDER PRESCRIPTION  MCG LEVOTHYROXINE.  HE SAID HE DIDN'T FEEL WELL ON  MCG AND HE HAS ALSO BEEN TRYING TO USE UP WHAT HE HAD.  I TOLD HIM HE CAN'T BE SWITCHING AROUND HIS THYROID STRENGTH LIKE THAT.

## 2018-04-24 NOTE — TELEPHONE ENCOUNTER
----- Message from Rubens Sandra sent at 4/24/2018  9:19 AM CDT -----  PATIENT IS CURRENTLY TAKING LEVOTHYROXINE 125 MCG 1 QD.  [facility].  INFORMED PATIENT OF RESULTS AND TO GET TSH IN 6 WEEKS.

## 2018-05-22 ENCOUNTER — OFFICE VISIT (OUTPATIENT)
Dept: FAMILY MEDICINE CLINIC | Facility: CLINIC | Age: 63
End: 2018-05-22

## 2018-05-22 VITALS
SYSTOLIC BLOOD PRESSURE: 128 MMHG | OXYGEN SATURATION: 96 % | HEIGHT: 70 IN | HEART RATE: 62 BPM | BODY MASS INDEX: 39.2 KG/M2 | DIASTOLIC BLOOD PRESSURE: 72 MMHG | TEMPERATURE: 98.6 F | WEIGHT: 273.8 LBS

## 2018-05-22 DIAGNOSIS — R60.0 EDEMA OF BOTH LEGS: ICD-10-CM

## 2018-05-22 DIAGNOSIS — E03.9 ACQUIRED HYPOTHYROIDISM: Primary | ICD-10-CM

## 2018-05-22 PROCEDURE — 99213 OFFICE O/P EST LOW 20 MIN: CPT | Performed by: FAMILY MEDICINE

## 2018-05-22 RX ORDER — NITROGLYCERIN 0.4 MG/1
TABLET SUBLINGUAL
Qty: 25 TABLET | Refills: 11 | Status: SHIPPED | OUTPATIENT
Start: 2018-05-22 | End: 2019-03-04 | Stop reason: SDUPTHER

## 2018-05-22 NOTE — PATIENT INSTRUCTIONS

## 2018-05-22 NOTE — PROGRESS NOTES
" Subjective   Pablo Pina is a 62 y.o. male.     History of Present Illness   /72 (BP Location: Left arm, Patient Position: Sitting, Cuff Size: Adult)   Pulse 62   Temp 98.6 °F (37 °C) (Temporal Artery )   Ht 177.8 cm (70\")   Wt 124 kg (273 lb 12.8 oz)   SpO2 96%   BMI 39.29 kg/m²     He says he is still taking lisinopril 5mg qd  He says he is taking synthroid 112mcg qd.  His tsh was low, he was adjusting his own synthroid based on how it made him feel.  I compromised and picked 112mcg as a dose.   He has had no more chest pain  He is here to recheck edema in legs.  Taking lasix 20mg qd  He reminds me he has had heart murmur since he was a kid    Review of Systems   Constitutional: Negative for chills, fatigue and fever.   HENT: Negative for congestion, ear discharge, ear pain, facial swelling, hearing loss, postnasal drip, rhinorrhea, sinus pressure, sore throat, trouble swallowing and voice change.    Eyes: Negative for discharge, redness and visual disturbance.   Respiratory: Negative for cough, chest tightness, shortness of breath and wheezing.    Cardiovascular: Negative for chest pain and palpitations.   Gastrointestinal: Negative for abdominal pain, blood in stool, constipation, diarrhea, nausea and vomiting.   Endocrine: Negative for polydipsia and polyuria.   Genitourinary: Negative for dysuria, flank pain, hematuria and urgency.   Musculoskeletal: Negative for arthralgias, back pain, joint swelling and myalgias.   Skin: Negative for rash.   Neurological: Negative for dizziness, weakness, numbness and headaches.   Hematological: Negative for adenopathy.   Psychiatric/Behavioral: Negative for confusion and sleep disturbance. The patient is not nervous/anxious.        Objective   Physical Exam   Constitutional: He is oriented to person, place, and time. He appears well-developed and well-nourished.   HENT:   Head: Normocephalic and atraumatic.   Right Ear: External ear normal.   Left Ear: " External ear normal.   Nose: Nose normal.   Mouth/Throat: Oropharynx is clear and moist.   Eyes: Conjunctivae and EOM are normal. Pupils are equal, round, and reactive to light.   Neck: Normal range of motion. Neck supple.   Cardiovascular: Normal rate and regular rhythm.  Exam reveals no gallop and no friction rub.    Murmur heard.  Pulmonary/Chest: Effort normal and breath sounds normal.   Abdominal: Soft. Bowel sounds are normal. He exhibits no distension. There is no tenderness. There is no rebound and no guarding.   Musculoskeletal: Normal range of motion. He exhibits no edema or deformity.   Neurological: He is alert and oriented to person, place, and time. No cranial nerve deficit.   Skin: Skin is warm and dry. No rash noted. No erythema.   Trace edema both lower legs   Psychiatric: He has a normal mood and affect. His behavior is normal. Judgment and thought content normal.   Nursing note and vitals reviewed.      Assessment/Plan   Pablo was seen today for follow-up.    Diagnoses and all orders for this visit:    Acquired hypothyroidism  -     TSH; Future    Edema of both legs  -     Basic Metabolic Panel; Future    reminded if cp go to er.    tsh recheck about 2-3 weeks, doesn't need to see me

## 2018-05-24 RX ORDER — PEN NEEDLE, DIABETIC 31 GX5/16"
NEEDLE, DISPOSABLE MISCELLANEOUS
Qty: 100 EACH | Refills: 11 | Status: SHIPPED | OUTPATIENT
Start: 2018-05-24 | End: 2019-10-25

## 2018-06-11 RX ORDER — INSULIN ASPART 100 [IU]/ML
INJECTION, SUSPENSION SUBCUTANEOUS
Qty: 45 ML | Refills: 0 | Status: SHIPPED | OUTPATIENT
Start: 2018-06-11 | End: 2018-07-18 | Stop reason: SDUPTHER

## 2018-06-18 ENCOUNTER — LAB (OUTPATIENT)
Dept: LAB | Facility: CLINIC | Age: 63
End: 2018-06-18

## 2018-06-18 DIAGNOSIS — E03.9 ACQUIRED HYPOTHYROIDISM: ICD-10-CM

## 2018-06-18 DIAGNOSIS — R60.0 EDEMA OF BOTH LEGS: ICD-10-CM

## 2018-06-18 PROCEDURE — 80048 BASIC METABOLIC PNL TOTAL CA: CPT | Performed by: FAMILY MEDICINE

## 2018-06-18 PROCEDURE — 84443 ASSAY THYROID STIM HORMONE: CPT | Performed by: FAMILY MEDICINE

## 2018-06-18 PROCEDURE — 36415 COLL VENOUS BLD VENIPUNCTURE: CPT | Performed by: FAMILY MEDICINE

## 2018-06-18 RX ORDER — LEVOTHYROXINE SODIUM 112 UG/1
TABLET ORAL
Qty: 30 TABLET | Refills: 0 | Status: SHIPPED | OUTPATIENT
Start: 2018-06-18 | End: 2018-06-19 | Stop reason: SDUPTHER

## 2018-06-19 LAB
ANION GAP SERPL CALCULATED.3IONS-SCNC: 9 MMOL/L (ref 5–15)
BUN BLD-MCNC: 15 MG/DL (ref 7–21)
BUN/CREAT SERPL: 18.5 (ref 7–25)
CALCIUM SPEC-SCNC: 9.6 MG/DL (ref 8.4–10.2)
CHLORIDE SERPL-SCNC: 98 MMOL/L (ref 95–110)
CO2 SERPL-SCNC: 30 MMOL/L (ref 22–31)
CREAT BLD-MCNC: 0.81 MG/DL (ref 0.7–1.3)
GFR SERPL CREATININE-BSD FRML MDRD: 97 ML/MIN/1.73 (ref 49–113)
GLUCOSE BLD-MCNC: 152 MG/DL (ref 60–100)
POTASSIUM BLD-SCNC: 4.9 MMOL/L (ref 3.5–5.1)
SODIUM BLD-SCNC: 137 MMOL/L (ref 137–145)
TSH SERPL DL<=0.05 MIU/L-ACNC: 4.2 MIU/ML (ref 0.46–4.68)

## 2018-06-19 RX ORDER — LEVOTHYROXINE SODIUM 112 UG/1
112 TABLET ORAL DAILY
Qty: 90 TABLET | Refills: 1 | Status: SHIPPED | OUTPATIENT
Start: 2018-06-19 | End: 2019-01-15 | Stop reason: SDUPTHER

## 2018-07-03 ENCOUNTER — TELEPHONE (OUTPATIENT)
Dept: FAMILY MEDICINE CLINIC | Facility: CLINIC | Age: 63
End: 2018-07-03

## 2018-07-03 NOTE — TELEPHONE ENCOUNTER
PATIENT WANTS RX FOR FREESTYLE MADDIE SENSOR TO MONITOR HIS GLUCOSE LEVEL INSTEAD OF FINGER STICK SENT TO [facility]. HE SAID HE ALREADY CHECKED HIS INS.

## 2018-07-18 RX ORDER — LEVOTHYROXINE SODIUM 112 UG/1
TABLET ORAL
Qty: 30 TABLET | Refills: 5 | Status: SHIPPED | OUTPATIENT
Start: 2018-07-18 | End: 2018-10-10 | Stop reason: HOSPADM

## 2018-07-18 RX ORDER — INSULIN ASPART 100 [IU]/ML
INJECTION, SUSPENSION SUBCUTANEOUS
Qty: 45 ML | Refills: 0 | Status: SHIPPED | OUTPATIENT
Start: 2018-07-18 | End: 2018-08-07 | Stop reason: SDUPTHER

## 2018-08-07 RX ORDER — INSULIN ASPART 100 [IU]/ML
INJECTION, SUSPENSION SUBCUTANEOUS
Qty: 45 ML | Refills: 0 | Status: SHIPPED | OUTPATIENT
Start: 2018-08-07 | End: 2018-09-25 | Stop reason: SDUPTHER

## 2018-09-26 RX ORDER — INSULIN ASPART 100 [IU]/ML
INJECTION, SUSPENSION SUBCUTANEOUS
Qty: 45 ML | Refills: 1 | Status: SHIPPED | OUTPATIENT
Start: 2018-09-26 | End: 2018-12-13 | Stop reason: SDUPTHER

## 2018-10-08 ENCOUNTER — HOSPITAL ENCOUNTER (OUTPATIENT)
Facility: HOSPITAL | Age: 63
Discharge: HOME | End: 2018-10-10
Attending: EMERGENCY MEDICINE | Admitting: INTERNAL MEDICINE

## 2018-10-08 ENCOUNTER — APPOINTMENT (OUTPATIENT)
Dept: GENERAL RADIOLOGY | Facility: HOSPITAL | Age: 63
End: 2018-10-08

## 2018-10-08 DIAGNOSIS — R07.9 CHEST PAIN, UNSPECIFIED TYPE: ICD-10-CM

## 2018-10-08 DIAGNOSIS — I21.4 NSTEMI (NON-ST ELEVATED MYOCARDIAL INFARCTION) (HCC): Primary | ICD-10-CM

## 2018-10-08 DIAGNOSIS — I16.0 HYPERTENSIVE URGENCY: ICD-10-CM

## 2018-10-08 DIAGNOSIS — R77.8 ELEVATED TROPONIN: ICD-10-CM

## 2018-10-08 LAB
ALBUMIN SERPL-MCNC: 4.3 G/DL (ref 3.4–4.8)
ALBUMIN/GLOB SERPL: 1.2 G/DL (ref 1.1–1.8)
ALP SERPL-CCNC: 49 U/L (ref 38–126)
ALT SERPL W P-5'-P-CCNC: 83 U/L (ref 21–72)
ANION GAP SERPL CALCULATED.3IONS-SCNC: 14 MMOL/L (ref 5–15)
AST SERPL-CCNC: 44 U/L (ref 17–59)
BASOPHILS # BLD AUTO: 0.03 10*3/MM3 (ref 0–0.2)
BASOPHILS NFR BLD AUTO: 0.5 % (ref 0–2)
BILIRUB SERPL-MCNC: 0.6 MG/DL (ref 0.2–1.3)
BUN BLD-MCNC: 12 MG/DL (ref 7–21)
BUN/CREAT SERPL: 14.5 (ref 7–25)
CALCIUM SPEC-SCNC: 9.2 MG/DL (ref 8.4–10.2)
CHLORIDE SERPL-SCNC: 94 MMOL/L (ref 95–110)
CO2 SERPL-SCNC: 27 MMOL/L (ref 22–31)
CREAT BLD-MCNC: 0.83 MG/DL (ref 0.7–1.3)
DEPRECATED RDW RBC AUTO: 42.6 FL (ref 35.1–43.9)
EOSINOPHIL # BLD AUTO: 0.15 10*3/MM3 (ref 0–0.7)
EOSINOPHIL NFR BLD AUTO: 2.4 % (ref 0–7)
ERYTHROCYTE [DISTWIDTH] IN BLOOD BY AUTOMATED COUNT: 14 % (ref 11.5–14.5)
GFR SERPL CREATININE-BSD FRML MDRD: 94 ML/MIN/1.73 (ref 49–113)
GLOBULIN UR ELPH-MCNC: 3.5 GM/DL (ref 2.3–3.5)
GLUCOSE BLD-MCNC: 196 MG/DL (ref 60–100)
HCT VFR BLD AUTO: 45.7 % (ref 39–49)
HGB BLD-MCNC: 16 G/DL (ref 13.7–17.3)
HOLD SPECIMEN: NORMAL
HOLD SPECIMEN: NORMAL
IMM GRANULOCYTES # BLD: 0.02 10*3/MM3 (ref 0–0.02)
IMM GRANULOCYTES NFR BLD: 0.3 % (ref 0–0.5)
INR PPP: 1.08 (ref 0.8–1.2)
LYMPHOCYTES # BLD AUTO: 1.88 10*3/MM3 (ref 0.6–4.2)
LYMPHOCYTES NFR BLD AUTO: 29.6 % (ref 10–50)
MCH RBC QN AUTO: 29.3 PG (ref 26.5–34)
MCHC RBC AUTO-ENTMCNC: 35 G/DL (ref 31.5–36.3)
MCV RBC AUTO: 83.5 FL (ref 80–98)
MONOCYTES # BLD AUTO: 0.38 10*3/MM3 (ref 0–0.9)
MONOCYTES NFR BLD AUTO: 6 % (ref 0–12)
NEUTROPHILS # BLD AUTO: 3.89 10*3/MM3 (ref 2–8.6)
NEUTROPHILS NFR BLD AUTO: 61.2 % (ref 37–80)
NT-PROBNP SERPL-MCNC: 62.4 PG/ML (ref 0–900)
PLATELET # BLD AUTO: 158 10*3/MM3 (ref 150–450)
PMV BLD AUTO: 11.5 FL (ref 8–12)
POTASSIUM BLD-SCNC: 4.1 MMOL/L (ref 3.5–5.1)
PROT SERPL-MCNC: 7.8 G/DL (ref 6.3–8.6)
PROTHROMBIN TIME: 13.8 SECONDS (ref 11.1–15.3)
RBC # BLD AUTO: 5.47 10*6/MM3 (ref 4.37–5.74)
SODIUM BLD-SCNC: 135 MMOL/L (ref 137–145)
TROPONIN I SERPL-MCNC: 0.18 NG/ML
TROPONIN I SERPL-MCNC: 0.18 NG/ML
TROPONIN I SERPL-MCNC: 0.19 NG/ML
WBC NRBC COR # BLD: 6.35 10*3/MM3 (ref 3.2–9.8)
WHOLE BLOOD HOLD SPECIMEN: NORMAL
WHOLE BLOOD HOLD SPECIMEN: NORMAL

## 2018-10-08 PROCEDURE — 83880 ASSAY OF NATRIURETIC PEPTIDE: CPT | Performed by: EMERGENCY MEDICINE

## 2018-10-08 PROCEDURE — G0378 HOSPITAL OBSERVATION PER HR: HCPCS

## 2018-10-08 PROCEDURE — 93010 ELECTROCARDIOGRAM REPORT: CPT | Performed by: INTERNAL MEDICINE

## 2018-10-08 PROCEDURE — 85025 COMPLETE CBC W/AUTO DIFF WBC: CPT | Performed by: EMERGENCY MEDICINE

## 2018-10-08 PROCEDURE — 84484 ASSAY OF TROPONIN QUANT: CPT | Performed by: FAMILY MEDICINE

## 2018-10-08 PROCEDURE — 36415 COLL VENOUS BLD VENIPUNCTURE: CPT | Performed by: FAMILY MEDICINE

## 2018-10-08 PROCEDURE — 71045 X-RAY EXAM CHEST 1 VIEW: CPT

## 2018-10-08 PROCEDURE — 93005 ELECTROCARDIOGRAM TRACING: CPT

## 2018-10-08 PROCEDURE — 94799 UNLISTED PULMONARY SVC/PX: CPT

## 2018-10-08 PROCEDURE — 84484 ASSAY OF TROPONIN QUANT: CPT | Performed by: EMERGENCY MEDICINE

## 2018-10-08 PROCEDURE — 94760 N-INVAS EAR/PLS OXIMETRY 1: CPT

## 2018-10-08 PROCEDURE — 85610 PROTHROMBIN TIME: CPT | Performed by: EMERGENCY MEDICINE

## 2018-10-08 PROCEDURE — 80053 COMPREHEN METABOLIC PANEL: CPT | Performed by: EMERGENCY MEDICINE

## 2018-10-08 PROCEDURE — 25010000002 ENOXAPARIN PER 10 MG: Performed by: FAMILY MEDICINE

## 2018-10-08 PROCEDURE — 93005 ELECTROCARDIOGRAM TRACING: CPT | Performed by: EMERGENCY MEDICINE

## 2018-10-08 PROCEDURE — 99284 EMERGENCY DEPT VISIT MOD MDM: CPT

## 2018-10-08 RX ORDER — NICOTINE POLACRILEX 4 MG
15 LOZENGE BUCCAL
Status: DISCONTINUED | OUTPATIENT
Start: 2018-10-08 | End: 2018-10-10 | Stop reason: HOSPADM

## 2018-10-08 RX ORDER — LISINOPRIL 10 MG/1
10 TABLET ORAL DAILY
Status: DISCONTINUED | OUTPATIENT
Start: 2018-10-09 | End: 2018-10-10 | Stop reason: HOSPADM

## 2018-10-08 RX ORDER — ASPIRIN 81 MG/1
324 TABLET, CHEWABLE ORAL ONCE
Status: COMPLETED | OUTPATIENT
Start: 2018-10-08 | End: 2018-10-08

## 2018-10-08 RX ORDER — ASPIRIN 81 MG/1
81 TABLET, CHEWABLE ORAL DAILY
Status: DISCONTINUED | OUTPATIENT
Start: 2018-10-09 | End: 2018-10-10 | Stop reason: HOSPADM

## 2018-10-08 RX ORDER — CLONIDINE HYDROCHLORIDE 0.2 MG/1
0.2 TABLET ORAL ONCE
Status: COMPLETED | OUTPATIENT
Start: 2018-10-08 | End: 2018-10-08

## 2018-10-08 RX ORDER — SODIUM CHLORIDE 0.9 % (FLUSH) 0.9 %
3-10 SYRINGE (ML) INJECTION AS NEEDED
Status: DISCONTINUED | OUTPATIENT
Start: 2018-10-08 | End: 2018-10-10 | Stop reason: HOSPADM

## 2018-10-08 RX ORDER — NALOXONE HCL 0.4 MG/ML
0.4 VIAL (ML) INJECTION
Status: DISCONTINUED | OUTPATIENT
Start: 2018-10-08 | End: 2018-10-10 | Stop reason: HOSPADM

## 2018-10-08 RX ORDER — SODIUM CHLORIDE 0.9 % (FLUSH) 0.9 %
10 SYRINGE (ML) INJECTION AS NEEDED
Status: DISCONTINUED | OUTPATIENT
Start: 2018-10-08 | End: 2018-10-10 | Stop reason: HOSPADM

## 2018-10-08 RX ORDER — SODIUM CHLORIDE 0.9 % (FLUSH) 0.9 %
3 SYRINGE (ML) INJECTION EVERY 12 HOURS SCHEDULED
Status: DISCONTINUED | OUTPATIENT
Start: 2018-10-08 | End: 2018-10-10 | Stop reason: HOSPADM

## 2018-10-08 RX ORDER — LABETALOL HYDROCHLORIDE 5 MG/ML
10 INJECTION, SOLUTION INTRAVENOUS EVERY 6 HOURS PRN
Status: DISCONTINUED | OUTPATIENT
Start: 2018-10-08 | End: 2018-10-10 | Stop reason: HOSPADM

## 2018-10-08 RX ORDER — NITROGLYCERIN 0.4 MG/1
0.4 TABLET SUBLINGUAL
Status: DISCONTINUED | OUTPATIENT
Start: 2018-10-08 | End: 2018-10-10 | Stop reason: HOSPADM

## 2018-10-08 RX ORDER — ACETAMINOPHEN 325 MG/1
650 TABLET ORAL ONCE
Status: COMPLETED | OUTPATIENT
Start: 2018-10-08 | End: 2018-10-08

## 2018-10-08 RX ORDER — MORPHINE SULFATE 2 MG/ML
2 INJECTION, SOLUTION INTRAMUSCULAR; INTRAVENOUS EVERY 4 HOURS PRN
Status: DISCONTINUED | OUTPATIENT
Start: 2018-10-08 | End: 2018-10-10 | Stop reason: HOSPADM

## 2018-10-08 RX ORDER — PANTOPRAZOLE SODIUM 40 MG/1
40 TABLET, DELAYED RELEASE ORAL EVERY MORNING
Status: DISCONTINUED | OUTPATIENT
Start: 2018-10-09 | End: 2018-10-10 | Stop reason: HOSPADM

## 2018-10-08 RX ORDER — LEVOTHYROXINE SODIUM 112 UG/1
112 TABLET ORAL
Status: DISCONTINUED | OUTPATIENT
Start: 2018-10-09 | End: 2018-10-10 | Stop reason: HOSPADM

## 2018-10-08 RX ORDER — DEXTROSE MONOHYDRATE 25 G/50ML
25 INJECTION, SOLUTION INTRAVENOUS
Status: DISCONTINUED | OUTPATIENT
Start: 2018-10-08 | End: 2018-10-10 | Stop reason: HOSPADM

## 2018-10-08 RX ADMIN — ENOXAPARIN SODIUM 100 MG: 100 INJECTION SUBCUTANEOUS at 17:38

## 2018-10-08 RX ADMIN — CLONIDINE HYDROCHLORIDE 0.2 MG: 0.2 TABLET ORAL at 13:33

## 2018-10-08 RX ADMIN — Medication 3 ML: at 21:00

## 2018-10-08 RX ADMIN — ACETAMINOPHEN 650 MG: 325 TABLET ORAL at 20:47

## 2018-10-08 RX ADMIN — NITROGLYCERIN 1 INCH: 20 OINTMENT TOPICAL at 17:44

## 2018-10-08 RX ADMIN — ASPIRIN 81 MG CHEWABLE TABLET 324 MG: 81 TABLET CHEWABLE at 13:33

## 2018-10-09 LAB
ANION GAP SERPL CALCULATED.3IONS-SCNC: 11 MMOL/L (ref 5–15)
ARTICHOKE IGE QN: 143 MG/DL (ref 1–129)
BASOPHILS # BLD AUTO: 0.03 10*3/MM3 (ref 0–0.2)
BASOPHILS NFR BLD AUTO: 0.4 % (ref 0–2)
BUN BLD-MCNC: 14 MG/DL (ref 7–21)
BUN/CREAT SERPL: 17.9 (ref 7–25)
CALCIUM SPEC-SCNC: 9.1 MG/DL (ref 8.4–10.2)
CHLORIDE SERPL-SCNC: 98 MMOL/L (ref 95–110)
CHOLEST SERPL-MCNC: 259 MG/DL (ref 0–199)
CO2 SERPL-SCNC: 24 MMOL/L (ref 22–31)
CREAT BLD-MCNC: 0.78 MG/DL (ref 0.7–1.3)
CREAT UR-MCNC: 40.8 MG/DL
DEPRECATED RDW RBC AUTO: 42.4 FL (ref 35.1–43.9)
EOSINOPHIL # BLD AUTO: 0.1 10*3/MM3 (ref 0–0.7)
EOSINOPHIL NFR BLD AUTO: 1.3 % (ref 0–7)
ERYTHROCYTE [DISTWIDTH] IN BLOOD BY AUTOMATED COUNT: 14.1 % (ref 11.5–14.5)
GFR SERPL CREATININE-BSD FRML MDRD: 101 ML/MIN/1.73 (ref 49–113)
GLUCOSE BLD-MCNC: 138 MG/DL (ref 60–100)
GLUCOSE BLDC GLUCOMTR-MCNC: 171 MG/DL (ref 70–130)
GLUCOSE BLDC GLUCOMTR-MCNC: 179 MG/DL (ref 70–130)
GLUCOSE BLDC GLUCOMTR-MCNC: 264 MG/DL (ref 70–130)
HCT VFR BLD AUTO: 43.3 % (ref 39–49)
HDLC SERPL-MCNC: 28 MG/DL (ref 60–200)
HGB BLD-MCNC: 15.2 G/DL (ref 13.7–17.3)
HOLD SPECIMEN: NORMAL
IMM GRANULOCYTES # BLD: 0.02 10*3/MM3 (ref 0–0.02)
IMM GRANULOCYTES NFR BLD: 0.3 % (ref 0–0.5)
INR PPP: 0.99 (ref 0.8–1.2)
LDLC/HDLC SERPL: ABNORMAL {RATIO} (ref 0–3.55)
LYMPHOCYTES # BLD AUTO: 2.18 10*3/MM3 (ref 0.6–4.2)
LYMPHOCYTES NFR BLD AUTO: 27.5 % (ref 10–50)
MCH RBC QN AUTO: 29.2 PG (ref 26.5–34)
MCHC RBC AUTO-ENTMCNC: 35.1 G/DL (ref 31.5–36.3)
MCV RBC AUTO: 83.1 FL (ref 80–98)
MONOCYTES # BLD AUTO: 0.73 10*3/MM3 (ref 0–0.9)
MONOCYTES NFR BLD AUTO: 9.2 % (ref 0–12)
NEUTROPHILS # BLD AUTO: 4.88 10*3/MM3 (ref 2–8.6)
NEUTROPHILS NFR BLD AUTO: 61.3 % (ref 37–80)
OSMOLALITY SERPL: 288 MOSM/KG (ref 280–290)
PLATELET # BLD AUTO: 163 10*3/MM3 (ref 150–450)
PMV BLD AUTO: 11.9 FL (ref 8–12)
POTASSIUM BLD-SCNC: 4 MMOL/L (ref 3.5–5.1)
PROTHROMBIN TIME: 12.9 SECONDS (ref 11.1–15.3)
RBC # BLD AUTO: 5.21 10*6/MM3 (ref 4.37–5.74)
SODIUM BLD-SCNC: 133 MMOL/L (ref 137–145)
SODIUM UR-SCNC: 77 MMOL/L (ref 30–90)
TRIGL SERPL-MCNC: 686 MG/DL (ref 20–199)
TROPONIN I SERPL-MCNC: 0.18 NG/ML
TROPONIN I SERPL-MCNC: 0.19 NG/ML
WBC NRBC COR # BLD: 7.94 10*3/MM3 (ref 3.2–9.8)

## 2018-10-09 PROCEDURE — 25010000002 BIVALIRUDIN TRIFLUOROACETATE 250 MG RECONSTITUTED SOLUTION 1 EACH VIAL: Performed by: INTERNAL MEDICINE

## 2018-10-09 PROCEDURE — C1769 GUIDE WIRE: HCPCS | Performed by: INTERNAL MEDICINE

## 2018-10-09 PROCEDURE — C1894 INTRO/SHEATH, NON-LASER: HCPCS | Performed by: INTERNAL MEDICINE

## 2018-10-09 PROCEDURE — 84300 ASSAY OF URINE SODIUM: CPT | Performed by: FAMILY MEDICINE

## 2018-10-09 PROCEDURE — 25010000002 FENTANYL CITRATE (PF) 100 MCG/2ML SOLUTION: Performed by: INTERNAL MEDICINE

## 2018-10-09 PROCEDURE — 80061 LIPID PANEL: CPT | Performed by: FAMILY MEDICINE

## 2018-10-09 PROCEDURE — 84484 ASSAY OF TROPONIN QUANT: CPT | Performed by: FAMILY MEDICINE

## 2018-10-09 PROCEDURE — 80048 BASIC METABOLIC PNL TOTAL CA: CPT | Performed by: INTERNAL MEDICINE

## 2018-10-09 PROCEDURE — 85025 COMPLETE CBC W/AUTO DIFF WBC: CPT | Performed by: INTERNAL MEDICINE

## 2018-10-09 PROCEDURE — 0 IOPAMIDOL PER 1 ML: Performed by: INTERNAL MEDICINE

## 2018-10-09 PROCEDURE — 25010000002 MIDAZOLAM PER 1 MG: Performed by: INTERNAL MEDICINE

## 2018-10-09 PROCEDURE — C1725 CATH, TRANSLUMIN NON-LASER: HCPCS | Performed by: INTERNAL MEDICINE

## 2018-10-09 PROCEDURE — C1874 STENT, COATED/COV W/DEL SYS: HCPCS | Performed by: INTERNAL MEDICINE

## 2018-10-09 PROCEDURE — G0378 HOSPITAL OBSERVATION PER HR: HCPCS

## 2018-10-09 PROCEDURE — 93455 CORONARY ART/GRFT ANGIO S&I: CPT | Performed by: INTERNAL MEDICINE

## 2018-10-09 PROCEDURE — 82962 GLUCOSE BLOOD TEST: CPT

## 2018-10-09 PROCEDURE — C1760 CLOSURE DEV, VASC: HCPCS | Performed by: INTERNAL MEDICINE

## 2018-10-09 PROCEDURE — 83930 ASSAY OF BLOOD OSMOLALITY: CPT | Performed by: FAMILY MEDICINE

## 2018-10-09 PROCEDURE — C1887 CATHETER, GUIDING: HCPCS | Performed by: INTERNAL MEDICINE

## 2018-10-09 PROCEDURE — C9604 PERC D-E COR REVASC T CABG S: HCPCS | Performed by: INTERNAL MEDICINE

## 2018-10-09 PROCEDURE — 63710000001 INSULIN ASPART PER 5 UNITS: Performed by: FAMILY MEDICINE

## 2018-10-09 PROCEDURE — 82570 ASSAY OF URINE CREATININE: CPT | Performed by: FAMILY MEDICINE

## 2018-10-09 PROCEDURE — 85610 PROTHROMBIN TIME: CPT | Performed by: INTERNAL MEDICINE

## 2018-10-09 DEVICE — XIENCE SIERRA™ EVEROLIMUS ELUTING CORONARY STENT SYSTEM 2.75 MM X 28 MM / RAPID-EXCHANGE
Type: IMPLANTABLE DEVICE | Status: FUNCTIONAL
Brand: XIENCE SIERRA™

## 2018-10-09 RX ORDER — NYSTATIN 100000 [USP'U]/G
POWDER TOPICAL EVERY 12 HOURS SCHEDULED
Status: DISCONTINUED | OUTPATIENT
Start: 2018-10-09 | End: 2018-10-10 | Stop reason: HOSPADM

## 2018-10-09 RX ORDER — LIDOCAINE HYDROCHLORIDE 20 MG/ML
INJECTION, SOLUTION INFILTRATION; PERINEURAL AS NEEDED
Status: DISCONTINUED | OUTPATIENT
Start: 2018-10-09 | End: 2018-10-09 | Stop reason: HOSPADM

## 2018-10-09 RX ORDER — MIDAZOLAM HYDROCHLORIDE 1 MG/ML
INJECTION INTRAMUSCULAR; INTRAVENOUS AS NEEDED
Status: DISCONTINUED | OUTPATIENT
Start: 2018-10-09 | End: 2018-10-09 | Stop reason: HOSPADM

## 2018-10-09 RX ORDER — SODIUM CHLORIDE 9 MG/ML
250 INJECTION, SOLUTION INTRAVENOUS ONCE AS NEEDED
Status: DISCONTINUED | OUTPATIENT
Start: 2018-10-09 | End: 2018-10-10 | Stop reason: HOSPADM

## 2018-10-09 RX ORDER — SODIUM CHLORIDE 9 MG/ML
100 INJECTION, SOLUTION INTRAVENOUS CONTINUOUS
Status: DISCONTINUED | OUTPATIENT
Start: 2018-10-09 | End: 2018-10-10 | Stop reason: HOSPADM

## 2018-10-09 RX ORDER — SODIUM CHLORIDE 0.9 % (FLUSH) 0.9 %
3 SYRINGE (ML) INJECTION EVERY 12 HOURS SCHEDULED
Status: DISCONTINUED | OUTPATIENT
Start: 2018-10-09 | End: 2018-10-09 | Stop reason: HOSPADM

## 2018-10-09 RX ORDER — SODIUM CHLORIDE 9 MG/ML
1-3 INJECTION, SOLUTION INTRAVENOUS CONTINUOUS
Status: DISCONTINUED | OUTPATIENT
Start: 2018-10-09 | End: 2018-10-10 | Stop reason: HOSPADM

## 2018-10-09 RX ORDER — SODIUM CHLORIDE 0.9 % (FLUSH) 0.9 %
1-10 SYRINGE (ML) INJECTION AS NEEDED
Status: DISCONTINUED | OUTPATIENT
Start: 2018-10-09 | End: 2018-10-09 | Stop reason: HOSPADM

## 2018-10-09 RX ORDER — FENTANYL CITRATE 50 UG/ML
INJECTION, SOLUTION INTRAMUSCULAR; INTRAVENOUS AS NEEDED
Status: DISCONTINUED | OUTPATIENT
Start: 2018-10-09 | End: 2018-10-09 | Stop reason: HOSPADM

## 2018-10-09 RX ADMIN — Medication 3 ML: at 22:33

## 2018-10-09 RX ADMIN — LEVOTHYROXINE SODIUM 112 MCG: 112 TABLET ORAL at 08:12

## 2018-10-09 RX ADMIN — Medication 3 ML: at 08:12

## 2018-10-09 RX ADMIN — NYSTATIN: 100000 POWDER TOPICAL at 22:36

## 2018-10-09 RX ADMIN — TICAGRELOR 90 MG: 90 TABLET ORAL at 21:08

## 2018-10-09 RX ADMIN — INSULIN ASPART 4 UNITS: 100 INJECTION, SOLUTION INTRAVENOUS; SUBCUTANEOUS at 22:31

## 2018-10-09 RX ADMIN — Medication 3 ML: at 08:13

## 2018-10-09 RX ADMIN — NYSTATIN: 100000 POWDER TOPICAL at 05:13

## 2018-10-09 RX ADMIN — LISINOPRIL 10 MG: 10 TABLET ORAL at 08:12

## 2018-10-09 RX ADMIN — SODIUM CHLORIDE 100 ML/HR: 900 INJECTION, SOLUTION INTRAVENOUS at 15:03

## 2018-10-09 RX ADMIN — INSULIN ASPART 12 UNITS: 100 INJECTION, SOLUTION INTRAVENOUS; SUBCUTANEOUS at 17:12

## 2018-10-09 RX ADMIN — SODIUM CHLORIDE 1.01 ML/KG/HR: 9 INJECTION, SOLUTION INTRAVENOUS at 10:57

## 2018-10-09 RX ADMIN — ASPIRIN 81 MG CHEWABLE TABLET 81 MG: 81 TABLET CHEWABLE at 08:12

## 2018-10-09 RX ADMIN — PANTOPRAZOLE SODIUM 40 MG: 40 TABLET, DELAYED RELEASE ORAL at 08:14

## 2018-10-10 VITALS
WEIGHT: 269 LBS | HEART RATE: 70 BPM | OXYGEN SATURATION: 95 % | TEMPERATURE: 96.7 F | HEIGHT: 70 IN | BODY MASS INDEX: 38.51 KG/M2 | RESPIRATION RATE: 18 BRPM | DIASTOLIC BLOOD PRESSURE: 68 MMHG | SYSTOLIC BLOOD PRESSURE: 131 MMHG

## 2018-10-10 PROBLEM — R77.8 ELEVATED TROPONIN: Status: ACTIVE | Noted: 2018-10-10

## 2018-10-10 PROBLEM — E78.5 HLD (HYPERLIPIDEMIA): Status: ACTIVE | Noted: 2018-10-10

## 2018-10-10 PROBLEM — I21.4 NSTEMI (NON-ST ELEVATED MYOCARDIAL INFARCTION) (HCC): Status: ACTIVE | Noted: 2018-10-10

## 2018-10-10 PROBLEM — I16.0 HYPERTENSIVE URGENCY: Status: ACTIVE | Noted: 2018-10-10

## 2018-10-10 LAB
ANION GAP SERPL CALCULATED.3IONS-SCNC: 11 MMOL/L (ref 5–15)
BUN BLD-MCNC: 14 MG/DL (ref 7–21)
BUN/CREAT SERPL: 15.9 (ref 7–25)
CALCIUM SPEC-SCNC: 8.8 MG/DL (ref 8.4–10.2)
CHLORIDE SERPL-SCNC: 102 MMOL/L (ref 95–110)
CO2 SERPL-SCNC: 22 MMOL/L (ref 22–31)
CREAT BLD-MCNC: 0.88 MG/DL (ref 0.7–1.3)
DEPRECATED RDW RBC AUTO: 43.4 FL (ref 35.1–43.9)
ERYTHROCYTE [DISTWIDTH] IN BLOOD BY AUTOMATED COUNT: 14.3 % (ref 11.5–14.5)
GFR SERPL CREATININE-BSD FRML MDRD: 88 ML/MIN/1.73 (ref 49–113)
GLUCOSE BLD-MCNC: 163 MG/DL (ref 60–100)
GLUCOSE BLDC GLUCOMTR-MCNC: 164 MG/DL (ref 70–130)
HCT VFR BLD AUTO: 42.6 % (ref 39–49)
HGB BLD-MCNC: 14.5 G/DL (ref 13.7–17.3)
MCH RBC QN AUTO: 28.5 PG (ref 26.5–34)
MCHC RBC AUTO-ENTMCNC: 34 G/DL (ref 31.5–36.3)
MCV RBC AUTO: 83.7 FL (ref 80–98)
PLATELET # BLD AUTO: 161 10*3/MM3 (ref 150–450)
PMV BLD AUTO: 11.4 FL (ref 8–12)
POTASSIUM BLD-SCNC: 4 MMOL/L (ref 3.5–5.1)
RBC # BLD AUTO: 5.09 10*6/MM3 (ref 4.37–5.74)
SODIUM BLD-SCNC: 135 MMOL/L (ref 137–145)
WBC NRBC COR # BLD: 8.86 10*3/MM3 (ref 3.2–9.8)

## 2018-10-10 PROCEDURE — 63710000001 INSULIN ASPART PER 5 UNITS: Performed by: FAMILY MEDICINE

## 2018-10-10 PROCEDURE — G0008 ADMIN INFLUENZA VIRUS VAC: HCPCS | Performed by: FAMILY MEDICINE

## 2018-10-10 PROCEDURE — 80048 BASIC METABOLIC PNL TOTAL CA: CPT | Performed by: INTERNAL MEDICINE

## 2018-10-10 PROCEDURE — 90661 CCIIV3 VAC ABX FR 0.5 ML IM: CPT | Performed by: FAMILY MEDICINE

## 2018-10-10 PROCEDURE — 85027 COMPLETE CBC AUTOMATED: CPT | Performed by: INTERNAL MEDICINE

## 2018-10-10 PROCEDURE — 25010000002 INFLUENZA VAC SUBUNIT QUAD 0.5 ML SUSPENSION PREFILLED SYRINGE: Performed by: FAMILY MEDICINE

## 2018-10-10 PROCEDURE — G0378 HOSPITAL OBSERVATION PER HR: HCPCS

## 2018-10-10 PROCEDURE — 82962 GLUCOSE BLOOD TEST: CPT

## 2018-10-10 RX ORDER — LISINOPRIL 10 MG/1
10 TABLET ORAL DAILY
Qty: 30 TABLET | Refills: 0 | Status: SHIPPED | OUTPATIENT
Start: 2018-10-11 | End: 2019-06-19 | Stop reason: HOSPADM

## 2018-10-10 RX ORDER — ATORVASTATIN CALCIUM 20 MG/1
20 TABLET, FILM COATED ORAL DAILY
Qty: 30 TABLET | Refills: 0 | Status: SHIPPED | OUTPATIENT
Start: 2018-10-10 | End: 2018-10-17 | Stop reason: SDUPTHER

## 2018-10-10 RX ADMIN — NYSTATIN: 100000 POWDER TOPICAL at 08:21

## 2018-10-10 RX ADMIN — INFLUENZA A VIRUS A/SINGAPORE/GP1908/2015 IVR-180 (H1N1) ANTIGEN (MDCK CELL DERIVED, PROPIOLACTONE INACTIVATED), INFLUENZA A VIRUS A/NORTH CAROLINA/04/2016 (H3N2) HEMAGGLUTININ ANTIGEN (MDCK CELL DERIVED, PROPIOLACTONE INACTIVATED), INFLUENZA B VIRUS B/IOWA/06/2017 HEMAGGLUTININ ANTIGEN (MDCK CELL DERIVED, PROPIOLACTONE INACTIVATED), INFLUENZA B VIRUS B/SINGAPORE/INFTT-16-0610/2016 HEMAGGLUTININ ANTIGEN (MDCK CELL DERIVED, PROPIOLACTONE INACTIVATED) 0.5 ML: 15; 15; 15; 15 INJECTION, SUSPENSION INTRAMUSCULAR at 10:15

## 2018-10-10 RX ADMIN — LEVOTHYROXINE SODIUM 112 MCG: 112 TABLET ORAL at 08:20

## 2018-10-10 RX ADMIN — ASPIRIN 81 MG CHEWABLE TABLET 81 MG: 81 TABLET CHEWABLE at 08:20

## 2018-10-10 RX ADMIN — TICAGRELOR 90 MG: 90 TABLET ORAL at 08:20

## 2018-10-10 RX ADMIN — INSULIN ASPART 4 UNITS: 100 INJECTION, SOLUTION INTRAVENOUS; SUBCUTANEOUS at 08:20

## 2018-10-10 RX ADMIN — LISINOPRIL 10 MG: 10 TABLET ORAL at 08:20

## 2018-10-10 RX ADMIN — Medication 3 ML: at 08:21

## 2018-10-11 ENCOUNTER — READMISSION MANAGEMENT (OUTPATIENT)
Dept: CALL CENTER | Facility: HOSPITAL | Age: 63
End: 2018-10-11

## 2018-10-11 NOTE — OUTREACH NOTE
Prep Survey      Responses   Facility patient discharged from?  [facility]   Is patient eligible?  Yes   Discharge diagnosis  NSTEMI,Hypertensive urgency, elevated troponin, HLD, chest pain, Coronary arteriosclerosis, acquired hypothyroidism, DM II, s/p cardiac cath with angioplasty and stent placement.    Does the patient have one of the following disease processes/diagnoses(primary or secondary)?  Acute MI (STEMI,NSTEMI)   Does the patient have Home health ordered?  No   Is there a DME ordered?  No   Comments regarding appointments  See AVS   Prep survey completed?  Yes          Mary Lofton RN

## 2018-10-12 ENCOUNTER — READMISSION MANAGEMENT (OUTPATIENT)
Dept: CALL CENTER | Facility: HOSPITAL | Age: 63
End: 2018-10-12

## 2018-10-12 NOTE — OUTREACH NOTE
AMI Week 1 Survey      Responses   Facility patient discharged from?  [facility]   Does the patient have one of the following disease processes/diagnoses(primary or secondary)?  Acute MI (STEMI,NSTEMI)   Is there a successful TCM telephone encounter documented?  No   Week 1 attempt successful?  Yes   Call start time  1517   Call end time  1521   Is patient permission given to speak with other caregiver?  Yes   List who call center can speak with  wife   Meds reviewed with patient/caregiver?  Yes   Is the patient having any side effects they believe may be caused by any medication additions or changes?  No   Does the patient have all prescriptions related to this admission filled (includes statins,anticoagulants,HTN meds,anti-arrhythmia meds)  Yes   Is the patient taking all medications as directed (includes completed medication regime)?  Yes   Comments regarding appointments  review appointments  with the patient   Does the patient have a primary care provider?   Yes   Does the patient have an appointment with their PCP,cardiologist,or clinic within 7 days of discharge?  Yes   Has the patient kept scheduled appointments due by today?  N/A   Has home health visited the patient within 72 hours of discharge?  N/A   Did the patient receive a copy of their discharge instructions?  Yes   Nursing interventions  Educated on MyChart   What is the patient's perception of their health status since discharge?  Improving   Nursing interventions  Nurse provided patient education   Is the patient/caregiver able to teach back signs and symptoms of when to call for help immediately:  Sudden discomfort in arms, back, neck or jaw, Shortness of breath at any time, Sudden sweating or clammy skin, Nausea or vomiting, Dizziness or lightheadedness, Irregular or rapid heart rate   Is the pateint /caregiver able to teach back the importance of cardiac rehab?  No   Is the patient/caregiver able to teach back lifestyle changes to help  prevent MIs  Regular exercise as approved by provider, Heart healthy diet, Maintaining a healthy weight, Managing diabetes, Reducing stress   Is the patient/caregiver able to teach back ways to prevent a second heart attack:  Take medications, Follow up with MD   Is the patient/caregiver able to teach back the hierarchy of who to call/visit for symptoms/problems? PCP, Specialist, Home health nurse, Urgent Care, ED, 911  No   Additional teach back comments  blood sugar was 135   Week 1 call completed?  Yes          Carol Weinberg RN

## 2018-10-17 ENCOUNTER — APPOINTMENT (OUTPATIENT)
Dept: LAB | Facility: HOSPITAL | Age: 63
End: 2018-10-17

## 2018-10-17 ENCOUNTER — OFFICE VISIT (OUTPATIENT)
Dept: FAMILY MEDICINE CLINIC | Facility: CLINIC | Age: 63
End: 2018-10-17

## 2018-10-17 VITALS
TEMPERATURE: 99.3 F | HEART RATE: 69 BPM | HEIGHT: 70 IN | SYSTOLIC BLOOD PRESSURE: 130 MMHG | WEIGHT: 270.6 LBS | OXYGEN SATURATION: 98 % | BODY MASS INDEX: 38.74 KG/M2 | DIASTOLIC BLOOD PRESSURE: 72 MMHG

## 2018-10-17 DIAGNOSIS — J40 BRONCHITIS: ICD-10-CM

## 2018-10-17 DIAGNOSIS — Z12.5 ENCOUNTER FOR PROSTATE CANCER SCREENING: ICD-10-CM

## 2018-10-17 DIAGNOSIS — Z79.4 TYPE 2 DIABETES MELLITUS WITH COMPLICATION, WITH LONG-TERM CURRENT USE OF INSULIN (HCC): Primary | ICD-10-CM

## 2018-10-17 DIAGNOSIS — E11.8 TYPE 2 DIABETES MELLITUS WITH COMPLICATION, WITH LONG-TERM CURRENT USE OF INSULIN (HCC): Primary | ICD-10-CM

## 2018-10-17 DIAGNOSIS — E78.5 HYPERLIPIDEMIA, UNSPECIFIED HYPERLIPIDEMIA TYPE: ICD-10-CM

## 2018-10-17 LAB — HBA1C MFR BLD: 6.9 % (ref 4–5.6)

## 2018-10-17 PROCEDURE — 80061 LIPID PANEL: CPT | Performed by: FAMILY MEDICINE

## 2018-10-17 PROCEDURE — 96372 THER/PROPH/DIAG INJ SC/IM: CPT | Performed by: FAMILY MEDICINE

## 2018-10-17 PROCEDURE — 99214 OFFICE O/P EST MOD 30 MIN: CPT | Performed by: FAMILY MEDICINE

## 2018-10-17 PROCEDURE — 83036 HEMOGLOBIN GLYCOSYLATED A1C: CPT | Performed by: FAMILY MEDICINE

## 2018-10-17 PROCEDURE — G0103 PSA SCREENING: HCPCS | Performed by: FAMILY MEDICINE

## 2018-10-17 RX ORDER — TRIAMCINOLONE ACETONIDE 40 MG/ML
80 INJECTION, SUSPENSION INTRA-ARTICULAR; INTRAMUSCULAR ONCE
Status: COMPLETED | OUTPATIENT
Start: 2018-10-17 | End: 2018-10-17

## 2018-10-17 RX ORDER — FLUTICASONE PROPIONATE 50 MCG
2 SPRAY, SUSPENSION (ML) NASAL DAILY
Qty: 1 BOTTLE | Refills: 11 | Status: SHIPPED | OUTPATIENT
Start: 2018-10-17 | End: 2022-04-18 | Stop reason: SDUPTHER

## 2018-10-17 RX ORDER — PROMETHAZINE HYDROCHLORIDE AND CODEINE PHOSPHATE 6.25; 1 MG/5ML; MG/5ML
5 SYRUP ORAL EVERY 6 HOURS PRN
Qty: 240 ML | Refills: 1 | Status: SHIPPED | OUTPATIENT
Start: 2018-10-17 | End: 2018-11-20 | Stop reason: SDUPTHER

## 2018-10-17 RX ORDER — CETIRIZINE HYDROCHLORIDE 10 MG/1
10 TABLET ORAL DAILY
Qty: 30 TABLET | Refills: 11 | Status: SHIPPED | OUTPATIENT
Start: 2018-10-17 | End: 2019-06-04 | Stop reason: SDUPTHER

## 2018-10-17 RX ORDER — ATORVASTATIN CALCIUM 20 MG/1
20 TABLET, FILM COATED ORAL DAILY
Qty: 90 TABLET | Refills: 3 | Status: SHIPPED | OUTPATIENT
Start: 2018-10-17 | End: 2018-10-24 | Stop reason: DRUGHIGH

## 2018-10-17 RX ADMIN — TRIAMCINOLONE ACETONIDE 80 MG: 40 INJECTION, SUSPENSION INTRA-ARTICULAR; INTRAMUSCULAR at 09:35

## 2018-10-17 NOTE — PATIENT INSTRUCTIONS

## 2018-10-17 NOTE — PROGRESS NOTES
" Subjective   Pablo Pina is a 62 y.o. male.     History of Present Illness     Hospital follow up non stemi and stent placed in vein graft in heart  Doing well  triglycerieds were 600 in hosptial.  Cough keeping up and night and sinus.     Review of Systems   Constitutional: Negative for chills, fatigue and fever.   HENT: Negative for congestion, ear discharge, ear pain, facial swelling, hearing loss, postnasal drip, rhinorrhea, sinus pressure, sore throat, trouble swallowing and voice change.    Eyes: Negative for discharge, redness and visual disturbance.   Respiratory: Negative for cough, chest tightness, shortness of breath and wheezing.    Cardiovascular: Negative for chest pain and palpitations.   Gastrointestinal: Negative for abdominal pain, blood in stool, constipation, diarrhea, nausea and vomiting.   Endocrine: Negative for polydipsia and polyuria.   Genitourinary: Negative for dysuria, flank pain, hematuria and urgency.   Musculoskeletal: Negative for arthralgias, back pain, joint swelling and myalgias.   Skin: Negative for rash.   Neurological: Negative for dizziness, weakness, numbness and headaches.   Hematological: Negative for adenopathy.   Psychiatric/Behavioral: Negative for confusion and sleep disturbance. The patient is not nervous/anxious.            /72 (BP Location: Left arm, Patient Position: Sitting, Cuff Size: Adult)   Pulse 69   Temp 99.3 °F (37.4 °C) (Temporal Artery )   Ht 177.8 cm (70\")   Wt 123 kg (270 lb 9.6 oz)   SpO2 98%   BMI 38.83 kg/m²       Objective     Physical Exam   Constitutional: He is oriented to person, place, and time. He appears well-developed and well-nourished.   HENT:   Head: Normocephalic and atraumatic.   Right Ear: External ear normal.   Left Ear: External ear normal.   Nose: Nose normal.   Eyes: Pupils are equal, round, and reactive to light. Conjunctivae and EOM are normal.   Neck: Normal range of motion.   Cardiovascular: Normal rate, " regular rhythm and normal heart sounds.    Pulmonary/Chest: Effort normal.   Poor effort, or diminished but clear   Abdominal: Soft.   Musculoskeletal: Normal range of motion.   Neurological: He is alert and oriented to person, place, and time.   Psychiatric: He has a normal mood and affect. His behavior is normal. Judgment and thought content normal.   Nursing note and vitals reviewed.          PAST MEDICAL HISTORY     Past Medical History:   Diagnosis Date   • Acquired hypothyroidism    • Acute bronchitis    • Arm pain     Pain in right arm     • Borderline glaucoma    • Clouded consciousness     wooziness when getting up from laying or sitting position    • Coronary arteriosclerosis    • Encounter for immunization    • Essential hypertension    • Fatigue    • Feces, abnormal     Other fecal abnormalities   • Gastroesophageal reflux disease    • Hypercholesterolemia    • Hyperlipidemia    • Hypermetropia    • Hypertensive disorder    • Impacted cerumen    • Impaired glucose tolerance    • Impotence    • Need for immunization against influenza    • Obstructive sleep apnea syndrome    • Overweight    • Patient noncompliance     with other medical treatment and regimen    • Patient noncompliance, general    • Presbyopia    • Sacroiliac joint pain    • Sensorineural hearing loss of both ears    • Skin tag    • Type 2 diabetes mellitus (CMS/HCC)       PAST SURGICAL HISTORY     Past Surgical History:   Procedure Laterality Date   • CARDIAC CATHETERIZATION N/A 10/9/2018    Procedure: Left Heart Cath;  Surgeon: Lit Rodriguez MD;  Location: [facility] CATH INVASIVE LOCATION;  Service: Cardiology   • CORONARY ARTERY BYPASS GRAFT  01/26/2005    CABG (1) - LIMA to LAD, SVG to D1, SVG to OM,SVG to PDA.   • INJECTION OF MEDICATION  10/01/2015    Kenalog (1) - OBI Nguyễn   • OTHER SURGICAL HISTORY      Coronary artery stent placement (1)   • OTHER SURGICAL HISTORY  06/02/2012    Remove Impacted Cerumen 80867 (1) - OLYA Hammond   • OTHER  SURGICAL HISTORY  03/11/2014    Skin Tag Removal (Each Additional 10 10667 (1) - OBI Nguyễn   • OTHER SURGICAL HISTORY  10/01/2015    Nebulizer Treatment 03849 (1) - OBI Nguyễn      SOCIAL HISTORY     Social History     Social History   • Marital status:      Social History Main Topics   • Smoking status: Former Smoker   • Smokeless tobacco: Never Used      Comment: Quit around 2005   • Alcohol use No   • Drug use: No   • Sexual activity: No     Other Topics Concern   • Not on file      ALLERGIES   Patient has no known allergies.   MEDICATIONS     Current Outpatient Prescriptions   Medication Sig Dispense Refill   • Alcohol Swabs 70 % pads 1 each 2 (Two) Times a Day. 100 each 11   • atorvastatin (LIPITOR) 20 MG tablet Take 1 tablet by mouth Daily. 90 tablet 3   • B-D ULTRAFINE III SHORT PEN 31G X 8 MM misc USE UTD WITH INSULIN  2   • B-D ULTRAFINE III SHORT PEN 31G X 8 MM misc USE AS DIRECTED WITH INSULIN 100 each 11   • furosemide (LASIX) 20 MG tablet Take 20 mg by mouth as needed.  12   • glucose blood (ONE TOUCH ULTRA TEST) test strip 1 each by Other route 3 (Three) Times a Day As Needed (SUGAR). Use as instructed 100 each 11   • lisinopril (PRINIVIL,ZESTRIL) 10 MG tablet Take 1 tablet by mouth Daily. 30 tablet 0   • nitroglycerin (NITROSTAT) 0.4 MG SL tablet Take no more than 3 doses in 15 minutes. 25 tablet 11   • NOVOLOG MIX 70/30 FLEXPEN (70-30) 100 UNIT/ML suspension pen-injector injection INJECT 90 UNITS EVERY MORNING AND 50 UNITS EVERY PM AND ADJUST AS DIRECTED 45 mL 1   • omeprazole (PRILOSEC) 20 MG capsule Take 1 capsule by mouth Daily. 30 capsule 11   • ONETOUCH DELICA LANCETS FINE misc TEST BLOOD SUGAR EVERY DAY AS NEEDED. 100 each 11   • ticagrelor (BRILINTA) 90 MG tablet tablet Take 1 tablet by mouth 2 (Two) Times a Day. 60 tablet 6   • cetirizine (zyrTEC) 10 MG tablet Take 1 tablet by mouth Daily. 30 tablet 11   • fluticasone (FLONASE) 50 MCG/ACT nasal spray 2 sprays into the nostril(s) as directed  by provider Daily. 1 bottle 11   • latanoprost (XALATAN) 0.005 % ophthalmic solution PLACE 1 DROP INTO EACH EYE AT BEDTIME  6   • levothyroxine (SYNTHROID, LEVOTHROID) 112 MCG tablet Take 1 tablet by mouth Daily. 90 tablet 1   • promethazine-codeine (PHENERGAN with CODEINE) 6.25-10 MG/5ML syrup Take 5 mL by mouth Every 6 (Six) Hours As Needed for Cough. 240 mL 1     No current facility-administered medications for this visit.         The following portions of the patient's history were reviewed and updated as appropriate: allergies, current medications, past family history, past medical history, past social history, past surgical history and problem list.        Assessment/Plan   Pablo was seen today for follow-up.    Diagnoses and all orders for this visit:    Type 2 diabetes mellitus with complication, with long-term current use of insulin (CMS/[facility])  -     Hemoglobin A1c    Encounter for prostate cancer screening  -     PSA Screen    Hyperlipidemia, unspecified hyperlipidemia type  -     Lipid Panel    Bronchitis  -     triamcinolone acetonide (KENALOG-40) injection 80 mg; Inject 2 mL into the appropriate muscle as directed by prescriber 1 (One) Time.    Other orders  -     cetirizine (zyrTEC) 10 MG tablet; Take 1 tablet by mouth Daily.  -     fluticasone (FLONASE) 50 MCG/ACT nasal spray; 2 sprays into the nostril(s) as directed by provider Daily.  -     promethazine-codeine (PHENERGAN with CODEINE) 6.25-10 MG/5ML syrup; Take 5 mL by mouth Every 6 (Six) Hours As Needed for Cough.  -     atorvastatin (LIPITOR) 20 MG tablet; Take 1 tablet by mouth Daily.        Call if worsens. Will call with lab results.  He says he is fasting                No Follow-up on file.                  This document has been electronically signed by Chon Nguyễn MD on October 17, 2018 9:42 AM

## 2018-10-18 LAB
ARTICHOKE IGE QN: 142 MG/DL (ref 1–129)
CHOLEST SERPL-MCNC: 178 MG/DL (ref 0–199)
HDLC SERPL-MCNC: 33 MG/DL (ref 60–200)
LDLC/HDLC SERPL: 3.43 {RATIO} (ref 0–3.55)
PSA SERPL-MCNC: 0.42 NG/ML (ref 0–4)
TRIGL SERPL-MCNC: 159 MG/DL (ref 20–199)

## 2018-10-22 ENCOUNTER — READMISSION MANAGEMENT (OUTPATIENT)
Dept: CALL CENTER | Facility: HOSPITAL | Age: 63
End: 2018-10-22

## 2018-10-22 NOTE — OUTREACH NOTE
AMI Week 2 Survey      Responses   Facility patient discharged from?  [facility]   Does the patient have one of the following disease processes/diagnoses(primary or secondary)?  Acute MI (STEMI,NSTEMI)   Week 2 attempt successful?  No   Unsuccessful attempts  Attempt 1          Roxanne Jiménez RN

## 2018-10-24 ENCOUNTER — READMISSION MANAGEMENT (OUTPATIENT)
Dept: CALL CENTER | Facility: HOSPITAL | Age: 63
End: 2018-10-24

## 2018-10-24 ENCOUNTER — TELEPHONE (OUTPATIENT)
Dept: FAMILY MEDICINE CLINIC | Facility: CLINIC | Age: 63
End: 2018-10-24

## 2018-10-24 RX ORDER — ATORVASTATIN CALCIUM 40 MG/1
40 TABLET, FILM COATED ORAL DAILY
Qty: 90 TABLET | Refills: 3 | Status: SHIPPED | OUTPATIENT
Start: 2018-10-24 | End: 2019-06-04 | Stop reason: ALTCHOICE

## 2018-10-24 NOTE — OUTREACH NOTE
AMI Week 2 Survey      Responses   Facility patient discharged from?  [facility]   Does the patient have one of the following disease processes/diagnoses(primary or secondary)?  Acute MI (STEMI,NSTEMI)   Week 2 attempt successful?  No   Unsuccessful attempts  Attempt 2          Alicia Aldana RN

## 2018-10-29 ENCOUNTER — TELEPHONE (OUTPATIENT)
Dept: FAMILY MEDICINE CLINIC | Facility: CLINIC | Age: 63
End: 2018-10-29

## 2018-10-29 ENCOUNTER — READMISSION MANAGEMENT (OUTPATIENT)
Dept: CALL CENTER | Facility: HOSPITAL | Age: 63
End: 2018-10-29

## 2018-10-29 NOTE — OUTREACH NOTE
AMI Week 3 Survey      Responses   Facility patient discharged from?  [facility]   Does the patient have one of the following disease processes/diagnoses(primary or secondary)?  Acute MI (STEMI,NSTEMI)   Week 3 attempt successful?  Yes   Call start time  1015   Call end time  1020   Is patient permission given to speak with other caregiver?  Yes   Meds reviewed with patient/caregiver?  Yes   Is the patient taking all medications as directed (includes completed medication regime)?  Yes   Medication comments  cholesterol upped to 40 mg   Comments regarding appointments  Has Seen Dr. tracy already   Has the patient kept scheduled appointments due by today?  Yes   What is the patient's perception of their health status since discharge?  Improving   Additional teach back comments  blood sugars running 130's          Roxanne Jiménez RN

## 2018-10-30 ENCOUNTER — TELEPHONE (OUTPATIENT)
Dept: FAMILY MEDICINE CLINIC | Facility: CLINIC | Age: 63
End: 2018-10-30

## 2018-10-30 RX ORDER — GUAIFENESIN 600 MG/1
1200 TABLET, EXTENDED RELEASE ORAL EVERY 12 HOURS SCHEDULED
Qty: 30 TABLET | Refills: 0 | Status: SHIPPED | OUTPATIENT
Start: 2018-10-30 | End: 2019-09-04

## 2018-10-30 NOTE — TELEPHONE ENCOUNTER
PATIENT'S WIFE CALLED AND SAID PATIENT IS FEELING DIZZY AND WONDERING IF HIS BLOOD MIGHT BE TOO THIN SINCE STARTING BRILINTA.  SHE WAS WANTING TO BRING HIM TO HAVE A LAB TEST.  DR. SULLIVAN SAID FOR HIM TO WATCH FOR BLOOD IN STOOL AND IF HE GETS WORSE, GO TO ER.  HE DIDN'T REALLY HAVE A TEST TO ORDER.  I ALSO TOLD HIS WIFE SHE MIGHT WANT TO CALL DR. JAMISON'S OFFICE SINCE HE PRESCRIBED BRILINTA.

## 2018-11-05 ENCOUNTER — READMISSION MANAGEMENT (OUTPATIENT)
Dept: CALL CENTER | Facility: HOSPITAL | Age: 63
End: 2018-11-05

## 2018-11-05 NOTE — OUTREACH NOTE
AMI Week 4 Survey      Responses   Facility patient discharged from?  [facility]   Does the patient have one of the following disease processes/diagnoses(primary or secondary)?  Acute MI (STEMI,NSTEMI)   Week 4 attempt successful?  Yes   Call start time  1715   Call end time  1719   Discharge diagnosis  NSTEMI,Hypertensive urgency, elevated troponin, HLD, chest pain, Coronary arteriosclerosis, acquired hypothyroidism, DM II, s/p cardiac cath with angioplasty and stent placement.    Is patient permission given to speak with other caregiver?  Yes   List who call center can speak with  wife   Meds reviewed with patient/caregiver?  Yes   Is the patient having any side effects they believe may be caused by any medication additions or changes?  No   Is the patient taking all medications as directed (includes completed medication regime)?  Yes   Has the patient kept scheduled appointments due by today?  Yes   Is the patient still receiving Home Health Services?  No   Psychosocial issues?  No   What is the patient's perception of their health status since discharge?  Improving   Nursing interventions  Nurse provided patient education   Is the patient/caregiver able to teach back signs and symptoms of when to call for help immediately:  Sudden discomfort in arms, back, neck or jaw, Shortness of breath at any time, Sudden sweating or clammy skin, Nausea or vomiting, Dizziness or lightheadedness, Irregular or rapid heart rate   Is the pateint /caregiver able to teach back the importance of cardiac rehab?  No [He states that he is not going to go to Rehab.]   Is the patient/caregiver able to teach back lifestyle changes to help prevent MIs  Regular exercise as approved by provider, Heart healthy diet, Maintaining a healthy weight, Managing diabetes, Reducing stress   Is the patient/caregiver able to teach back ways to prevent a second heart attack:  Take medications, Follow up with MD   Is the patient/caregiver able to teach  back the hierarchy of who to call/visit for symptoms/problems? PCP, Specialist, Home health nurse, Urgent Care, ED, 911  No   Week 4 call completed?  Yes   Would the patient like one additional call?  No   Graduated  Yes   Did the patient feel the follow up calls were helpful during their recovery period?  Yes   Was the number of calls appropriate?  Yes   Does the patient have an Advance Directive or Living Will?  No [He is not sure. ]   Is the patient/caregiver familiar with Advance Care Planning?  No   Would the patient like more information on Advance Care Planning?  No          Maria E Johnson RN

## 2018-11-20 ENCOUNTER — TELEPHONE (OUTPATIENT)
Dept: FAMILY MEDICINE CLINIC | Facility: CLINIC | Age: 63
End: 2018-11-20

## 2018-11-20 RX ORDER — PROMETHAZINE HYDROCHLORIDE AND CODEINE PHOSPHATE 6.25; 1 MG/5ML; MG/5ML
5 SYRUP ORAL EVERY 6 HOURS PRN
Qty: 240 ML | Refills: 1 | Status: SHIPPED | OUTPATIENT
Start: 2018-11-20 | End: 2019-06-04 | Stop reason: SDUPTHER

## 2018-11-29 ENCOUNTER — TELEPHONE (OUTPATIENT)
Dept: FAMILY MEDICINE CLINIC | Facility: CLINIC | Age: 63
End: 2018-11-29

## 2018-11-29 RX ORDER — RANITIDINE 150 MG/1
150 TABLET ORAL 2 TIMES DAILY
Qty: 60 TABLET | Refills: 11 | Status: SHIPPED | OUTPATIENT
Start: 2018-11-29 | End: 2019-10-21 | Stop reason: ALTCHOICE

## 2018-11-29 NOTE — TELEPHONE ENCOUNTER
PATIENT STATES HE IS NOW ON PLAVIX AND CAN'T TAKE OMEPRAZOLE.  HE ASKED IF YOU COULD SEND IN RX FOR RANITIDINE 150 MG 1 BID PRN.  LUCY AGUDELO

## 2018-12-10 DIAGNOSIS — E11.65 TYPE 2 DIABETES MELLITUS WITH HYPERGLYCEMIA, WITH LONG-TERM CURRENT USE OF INSULIN (HCC): Primary | ICD-10-CM

## 2018-12-10 DIAGNOSIS — Z79.4 TYPE 2 DIABETES MELLITUS WITH HYPERGLYCEMIA, WITH LONG-TERM CURRENT USE OF INSULIN (HCC): Primary | ICD-10-CM

## 2018-12-13 ENCOUNTER — TELEPHONE (OUTPATIENT)
Dept: FAMILY MEDICINE CLINIC | Facility: CLINIC | Age: 63
End: 2018-12-13

## 2019-01-15 DIAGNOSIS — E03.9 ACQUIRED HYPOTHYROIDISM: Primary | ICD-10-CM

## 2019-01-15 RX ORDER — LEVOTHYROXINE SODIUM 112 UG/1
112 TABLET ORAL DAILY
Qty: 30 TABLET | Refills: 0 | Status: SHIPPED | OUTPATIENT
Start: 2019-01-15 | End: 2019-01-29 | Stop reason: DRUGHIGH

## 2019-01-15 RX ORDER — LEVOTHYROXINE SODIUM 112 UG/1
TABLET ORAL
Qty: 30 TABLET | Refills: 0 | OUTPATIENT
Start: 2019-01-15

## 2019-01-23 RX ORDER — LISINOPRIL 5 MG/1
TABLET ORAL
Qty: 90 TABLET | Refills: 0 | Status: SHIPPED | OUTPATIENT
Start: 2019-01-23 | End: 2019-04-29 | Stop reason: SDUPTHER

## 2019-01-28 ENCOUNTER — LAB (OUTPATIENT)
Dept: LAB | Facility: HOSPITAL | Age: 64
End: 2019-01-28

## 2019-01-28 DIAGNOSIS — E03.9 ACQUIRED HYPOTHYROIDISM: ICD-10-CM

## 2019-01-28 PROCEDURE — 84443 ASSAY THYROID STIM HORMONE: CPT | Performed by: FAMILY MEDICINE

## 2019-01-29 DIAGNOSIS — E03.9 ACQUIRED HYPOTHYROIDISM: Primary | ICD-10-CM

## 2019-01-29 LAB — TSH SERPL DL<=0.05 MIU/L-ACNC: 14.4 MIU/ML (ref 0.46–4.68)

## 2019-01-29 RX ORDER — LEVOTHYROXINE SODIUM 137 UG/1
137 TABLET ORAL DAILY
Qty: 30 TABLET | Refills: 1 | Status: SHIPPED | OUTPATIENT
Start: 2019-01-29 | End: 2019-03-21 | Stop reason: SDUPTHER

## 2019-03-05 RX ORDER — NITROGLYCERIN 0.4 MG/1
TABLET SUBLINGUAL
Qty: 25 TABLET | Refills: 11 | Status: SHIPPED | OUTPATIENT
Start: 2019-03-05 | End: 2021-05-11 | Stop reason: SDUPTHER

## 2019-03-21 RX ORDER — LEVOTHYROXINE SODIUM 137 UG/1
137 TABLET ORAL DAILY
Qty: 30 TABLET | Refills: 0 | Status: SHIPPED | OUTPATIENT
Start: 2019-03-21 | End: 2019-04-03 | Stop reason: DRUGHIGH

## 2019-04-02 ENCOUNTER — LAB (OUTPATIENT)
Dept: LAB | Facility: HOSPITAL | Age: 64
End: 2019-04-02

## 2019-04-02 DIAGNOSIS — E03.9 ACQUIRED HYPOTHYROIDISM: ICD-10-CM

## 2019-04-02 LAB — TSH SERPL DL<=0.05 MIU/L-ACNC: 6.43 MIU/ML (ref 0.27–4.2)

## 2019-04-02 PROCEDURE — 84443 ASSAY THYROID STIM HORMONE: CPT | Performed by: FAMILY MEDICINE

## 2019-04-03 DIAGNOSIS — E03.9 ACQUIRED HYPOTHYROIDISM: Primary | ICD-10-CM

## 2019-04-03 RX ORDER — LEVOTHYROXINE SODIUM 0.15 MG/1
150 TABLET ORAL DAILY
Qty: 30 TABLET | Refills: 1 | Status: SHIPPED | OUTPATIENT
Start: 2019-04-03 | End: 2019-05-28 | Stop reason: SDUPTHER

## 2019-04-29 RX ORDER — LISINOPRIL 5 MG/1
TABLET ORAL
Qty: 90 TABLET | Refills: 0 | Status: SHIPPED | OUTPATIENT
Start: 2019-04-29 | End: 2019-09-04 | Stop reason: SDUPTHER

## 2019-05-28 RX ORDER — LEVOTHYROXINE SODIUM 0.15 MG/1
TABLET ORAL
Qty: 90 TABLET | Refills: 1 | Status: SHIPPED | OUTPATIENT
Start: 2019-05-28 | End: 2019-06-03 | Stop reason: SDUPTHER

## 2019-05-29 ENCOUNTER — LAB (OUTPATIENT)
Dept: LAB | Facility: HOSPITAL | Age: 64
End: 2019-05-29

## 2019-05-29 DIAGNOSIS — E03.9 ACQUIRED HYPOTHYROIDISM: ICD-10-CM

## 2019-05-29 PROCEDURE — 84443 ASSAY THYROID STIM HORMONE: CPT | Performed by: FAMILY MEDICINE

## 2019-05-30 LAB — TSH SERPL DL<=0.05 MIU/L-ACNC: 4.26 MIU/ML (ref 0.27–4.2)

## 2019-06-03 RX ORDER — LEVOTHYROXINE SODIUM 0.15 MG/1
150 TABLET ORAL DAILY
Qty: 90 TABLET | Refills: 1 | Status: SHIPPED | OUTPATIENT
Start: 2019-06-03 | End: 2020-06-25 | Stop reason: SDUPTHER

## 2019-06-04 ENCOUNTER — APPOINTMENT (OUTPATIENT)
Dept: LAB | Facility: HOSPITAL | Age: 64
End: 2019-06-04

## 2019-06-04 ENCOUNTER — OFFICE VISIT (OUTPATIENT)
Dept: FAMILY MEDICINE CLINIC | Facility: CLINIC | Age: 64
End: 2019-06-04

## 2019-06-04 VITALS
WEIGHT: 272.6 LBS | HEIGHT: 70 IN | OXYGEN SATURATION: 94 % | BODY MASS INDEX: 39.03 KG/M2 | DIASTOLIC BLOOD PRESSURE: 76 MMHG | HEART RATE: 62 BPM | TEMPERATURE: 98.3 F | SYSTOLIC BLOOD PRESSURE: 120 MMHG

## 2019-06-04 DIAGNOSIS — J30.2 SEASONAL ALLERGIC RHINITIS, UNSPECIFIED TRIGGER: ICD-10-CM

## 2019-06-04 DIAGNOSIS — Z79.4 TYPE 2 DIABETES MELLITUS WITH HYPERGLYCEMIA, WITH LONG-TERM CURRENT USE OF INSULIN (HCC): Primary | ICD-10-CM

## 2019-06-04 DIAGNOSIS — E11.65 TYPE 2 DIABETES MELLITUS WITH HYPERGLYCEMIA, WITH LONG-TERM CURRENT USE OF INSULIN (HCC): Primary | ICD-10-CM

## 2019-06-04 DIAGNOSIS — R05.9 COUGH: ICD-10-CM

## 2019-06-04 LAB — HBA1C MFR BLD: 6.94 % (ref 4.8–5.6)

## 2019-06-04 PROCEDURE — 96372 THER/PROPH/DIAG INJ SC/IM: CPT | Performed by: FAMILY MEDICINE

## 2019-06-04 PROCEDURE — 99213 OFFICE O/P EST LOW 20 MIN: CPT | Performed by: FAMILY MEDICINE

## 2019-06-04 PROCEDURE — 83036 HEMOGLOBIN GLYCOSYLATED A1C: CPT | Performed by: FAMILY MEDICINE

## 2019-06-04 RX ORDER — TRIAMCINOLONE ACETONIDE 40 MG/ML
80 INJECTION, SUSPENSION INTRA-ARTICULAR; INTRAMUSCULAR ONCE
Status: COMPLETED | OUTPATIENT
Start: 2019-06-04 | End: 2019-06-04

## 2019-06-04 RX ORDER — CLOPIDOGREL BISULFATE 75 MG/1
TABLET ORAL DAILY
Refills: 1 | COMMUNITY
Start: 2019-05-03 | End: 2019-06-19 | Stop reason: HOSPADM

## 2019-06-04 RX ORDER — PROMETHAZINE HYDROCHLORIDE AND CODEINE PHOSPHATE 6.25; 1 MG/5ML; MG/5ML
5 SYRUP ORAL EVERY 6 HOURS PRN
Qty: 240 ML | Refills: 1 | Status: SHIPPED | OUTPATIENT
Start: 2019-06-04 | End: 2019-11-22

## 2019-06-04 RX ORDER — LEVOTHYROXINE SODIUM 112 UG/1
112 TABLET ORAL DAILY
Refills: 1 | Status: ON HOLD | COMMUNITY
Start: 2019-03-21 | End: 2019-06-18

## 2019-06-04 RX ORDER — PRAVASTATIN SODIUM 40 MG
40 TABLET ORAL NIGHTLY
COMMUNITY
Start: 2019-05-28 | End: 2019-10-25

## 2019-06-04 RX ORDER — CETIRIZINE HYDROCHLORIDE 10 MG/1
10 TABLET ORAL DAILY
Qty: 30 TABLET | Refills: 11 | Status: SHIPPED | OUTPATIENT
Start: 2019-06-04 | End: 2022-04-18

## 2019-06-04 RX ORDER — GUAIFENESIN AND DEXTROMETHORPHAN HYDROBROMIDE 600; 30 MG/1; MG/1
1 TABLET, EXTENDED RELEASE ORAL 2 TIMES DAILY PRN
Qty: 30 EACH | Refills: 11 | Status: SHIPPED | OUTPATIENT
Start: 2019-06-04 | End: 2019-11-22

## 2019-06-04 RX ADMIN — TRIAMCINOLONE ACETONIDE 80 MG: 40 INJECTION, SUSPENSION INTRA-ARTICULAR; INTRAMUSCULAR at 10:00

## 2019-06-04 NOTE — PROGRESS NOTES
" Subjective   Pablo Pina is a 63 y.o. male.     History of Present Illness     Ears, cough, sinus 2 weeks.  Not taking antihistamine.  Wants refill mucinex.       Review of Systems   Constitutional: Negative for chills, fatigue and fever.   HENT: Positive for congestion, postnasal drip and rhinorrhea. Negative for ear discharge, ear pain, facial swelling, hearing loss, sinus pressure, sore throat, trouble swallowing and voice change.    Eyes: Negative for discharge, redness and visual disturbance.   Respiratory: Positive for cough. Negative for chest tightness, shortness of breath and wheezing.    Cardiovascular: Negative for chest pain and palpitations.   Gastrointestinal: Negative for abdominal pain, blood in stool, constipation, diarrhea, nausea and vomiting.   Endocrine: Negative for polydipsia and polyuria.   Genitourinary: Negative for dysuria, flank pain, hematuria and urgency.   Musculoskeletal: Negative for arthralgias, back pain, joint swelling and myalgias.   Skin: Negative for rash.   Neurological: Negative for dizziness, weakness, numbness and headaches.   Hematological: Negative for adenopathy.   Psychiatric/Behavioral: Negative for confusion and sleep disturbance. The patient is not nervous/anxious.            /76 (BP Location: Left arm, Patient Position: Sitting, Cuff Size: Adult)   Pulse 62   Temp 98.3 °F (36.8 °C) (Temporal)   Ht 177.8 cm (70\")   Wt 124 kg (272 lb 9.6 oz)   SpO2 94%   BMI 39.11 kg/m²       Objective     Physical Exam   Constitutional: He is oriented to person, place, and time. He appears well-developed and well-nourished.   HENT:   Head: Normocephalic and atraumatic.   Right Ear: External ear normal.   Left Ear: External ear normal.   Nose: Nose normal.   Mouth/Throat: Oropharynx is clear and moist.   Soft tissue crowding   Eyes: Conjunctivae and EOM are normal. Pupils are equal, round, and reactive to light.   Neck: Normal range of motion. Neck supple. "   Cardiovascular: Normal rate, regular rhythm and normal heart sounds. Exam reveals no gallop and no friction rub.   No murmur heard.  Pulmonary/Chest: Effort normal and breath sounds normal.   Abdominal: Soft. Bowel sounds are normal. He exhibits no distension. There is no tenderness. There is no rebound and no guarding.   Musculoskeletal: Normal range of motion. He exhibits no edema or deformity.   Neurological: He is alert and oriented to person, place, and time. No cranial nerve deficit.   Skin: Skin is warm and dry. No rash noted. No erythema.   Psychiatric: He has a normal mood and affect. His behavior is normal. Judgment and thought content normal.   Nursing note and vitals reviewed.          PAST MEDICAL HISTORY     Past Medical History:   Diagnosis Date   • Acquired hypothyroidism    • Acute bronchitis    • Arm pain     Pain in right arm     • Borderline glaucoma    • Clouded consciousness     wooziness when getting up from laying or sitting position    • Coronary arteriosclerosis    • Encounter for immunization    • Essential hypertension    • Fatigue    • Feces, abnormal     Other fecal abnormalities   • Gastroesophageal reflux disease    • Hypercholesterolemia    • Hyperlipidemia    • Hypermetropia    • Hypertensive disorder    • Impacted cerumen    • Impaired glucose tolerance    • Impotence    • Need for immunization against influenza    • Obstructive sleep apnea syndrome    • Overweight    • Patient noncompliance     with other medical treatment and regimen    • Patient noncompliance, general    • Presbyopia    • Sacroiliac joint pain    • Sensorineural hearing loss of both ears    • Skin tag    • Type 2 diabetes mellitus (CMS/HCC)       PAST SURGICAL HISTORY     Past Surgical History:   Procedure Laterality Date   • CARDIAC CATHETERIZATION N/A 10/9/2018    Procedure: Left Heart Cath;  Surgeon: Lit Rodriguez MD;  Location: [facility] CATH INVASIVE LOCATION;  Service: Cardiology   • CORONARY ARTERY  BYPASS GRAFT  01/26/2005    CABG (1) - LIMA to LAD, SVG to D1, SVG to OM,SVG to PDA.   • INJECTION OF MEDICATION  10/01/2015    Kenalog (1) - OBI Nguyễn   • OTHER SURGICAL HISTORY      Coronary artery stent placement (1)   • OTHER SURGICAL HISTORY  06/02/2012    Remove Impacted Cerumen 31229 (1) - OLYA Hammond   • OTHER SURGICAL HISTORY  03/11/2014    Skin Tag Removal (Each Additional 10 42310 (1) - OBI Nguyễn   • OTHER SURGICAL HISTORY  10/01/2015    Nebulizer Treatment 75177 (1) - OBI Nguyễn      SOCIAL HISTORY     Social History     Socioeconomic History   • Marital status:      Spouse name: Not on file   • Number of children: Not on file   • Years of education: Not on file   • Highest education level: Not on file   Tobacco Use   • Smoking status: Former Smoker   • Smokeless tobacco: Never Used   • Tobacco comment: Quit around 2005   Substance and Sexual Activity   • Alcohol use: No   • Drug use: No   • Sexual activity: No      ALLERGIES   Patient has no known allergies.   MEDICATIONS     Current Outpatient Medications   Medication Sig Dispense Refill   • Alcohol Swabs 70 % pads 1 each 2 (Two) Times a Day. 100 each 11   • B-D ULTRAFINE III SHORT PEN 31G X 8 MM misc USE UTD WITH INSULIN  2   • B-D ULTRAFINE III SHORT PEN 31G X 8 MM misc USE AS DIRECTED WITH INSULIN 100 each 11   • clopidogrel (PLAVIX) 75 MG tablet Take  by mouth Daily.  1   • fluticasone (FLONASE) 50 MCG/ACT nasal spray 2 sprays into the nostril(s) as directed by provider Daily. 1 bottle 11   • furosemide (LASIX) 20 MG tablet Take 20 mg by mouth as needed.  12   • insulin aspart protamine & aspart (NOVOLOG) 70/30 100 unit/mL 90 u am and 30-50u pm. 50 mL 5   • latanoprost (XALATAN) 0.005 % ophthalmic solution PLACE 1 DROP INTO EACH EYE AT BEDTIME  6   • levothyroxine (SYNTHROID, LEVOTHROID) 150 MCG tablet Take 1 tablet by mouth Daily. 90 tablet 1   • lisinopril (PRINIVIL,ZESTRIL) 10 MG tablet Take 1 tablet by mouth Daily. 30 tablet 0   • lisinopril  (PRINIVIL,ZESTRIL) 5 MG tablet TAKE 1 TABLET BY MOUTH EVERY DAY 90 tablet 0   • nitroglycerin (NITROSTAT) 0.4 MG SL tablet DISSOLVE 1 TABLET BY MOUTH UNDER THE TONGUE AS NEEDED FOR CHEST PAIN, DO NOT EXCEED MORE THAN 3 DOSES IN 15 MINUTES 25 tablet 11   • ONE TOUCH ULTRA TEST test strip TEST BLOOD SUGAR THREE TIMES DAILY AS INSTRUCTED. 100 each 11   • ONETOUCH DELICA LANCETS FINE misc USE THREE TIMES DAILY TO TEST 100 each 3   • promethazine-codeine (PHENERGAN with CODEINE) 6.25-10 MG/5ML syrup Take 5 mL by mouth Every 6 (Six) Hours As Needed for Cough. 240 mL 1   • cetirizine (zyrTEC) 10 MG tablet Take 1 tablet by mouth Daily. 30 tablet 11   • guaiFENesin (MUCINEX) 600 MG 12 hr tablet Take 2 tablets by mouth Every 12 (Twelve) Hours. 30 tablet 0   • levothyroxine (SYNTHROID, LEVOTHROID) 112 MCG tablet Take 112 mcg by mouth Daily.  1   • pravastatin (PRAVACHOL) 40 MG tablet      • raNITIdine (ZANTAC) 150 MG tablet Take 1 tablet by mouth 2 (Two) Times a Day. 60 tablet 11     Current Facility-Administered Medications   Medication Dose Route Frequency Provider Last Rate Last Dose   • triamcinolone acetonide (KENALOG-40) injection 80 mg  80 mg Intramuscular Once Chon Nguyễn MD            The following portions of the patient's history were reviewed and updated as appropriate: allergies, current medications, past family history, past medical history, past social history, past surgical history and problem list.        Assessment/Plan   Pablo was seen today for cough, uri, earache and sore throat.    Diagnoses and all orders for this visit:    Type 2 diabetes mellitus with hyperglycemia, with long-term current use of insulin (CMS/[facility])  -     Hemoglobin A1c    Seasonal allergic rhinitis, unspecified trigger  -     triamcinolone acetonide (KENALOG-40) injection 80 mg    Cough  -     triamcinolone acetonide (KENALOG-40) injection 80 mg    Other orders  -     cetirizine (zyrTEC) 10 MG tablet; Take 1 tablet by mouth  Daily.  -     promethazine-codeine (PHENERGAN with CODEINE) 6.25-10 MG/5ML syrup; Take 5 mL by mouth Every 6 (Six) Hours As Needed for Cough.  -     guaifenesin-dextromethorphan (MUCINEX DM)  MG tablet sustained-release 12 hour tablet; Take 1 tablet by mouth 2 (Two) Times a Day As Needed (congestion).      Restart taking zyrtec                 No Follow-up on file.                  This document has been electronically signed by Chon Nguyễn MD on June 4, 2019 10:04 AM

## 2019-06-04 NOTE — PATIENT INSTRUCTIONS

## 2019-06-17 ENCOUNTER — HOSPITAL ENCOUNTER (EMERGENCY)
Facility: HOSPITAL | Age: 64
Discharge: LEFT BEFORE DISCHARGE | End: 2019-06-17
Attending: EMERGENCY MEDICINE | Admitting: EMERGENCY MEDICINE

## 2019-06-17 ENCOUNTER — APPOINTMENT (OUTPATIENT)
Dept: GENERAL RADIOLOGY | Facility: HOSPITAL | Age: 64
End: 2019-06-17

## 2019-06-17 VITALS
HEART RATE: 72 BPM | WEIGHT: 271 LBS | RESPIRATION RATE: 18 BRPM | DIASTOLIC BLOOD PRESSURE: 72 MMHG | HEIGHT: 70 IN | OXYGEN SATURATION: 98 % | SYSTOLIC BLOOD PRESSURE: 127 MMHG | TEMPERATURE: 97.4 F | BODY MASS INDEX: 38.8 KG/M2

## 2019-06-17 DIAGNOSIS — I65.23 CALCIFICATION OF BOTH CAROTID ARTERIES: Primary | ICD-10-CM

## 2019-06-17 DIAGNOSIS — M54.2 NECK PAIN: ICD-10-CM

## 2019-06-17 DIAGNOSIS — R77.8 ELEVATED TROPONIN I LEVEL: ICD-10-CM

## 2019-06-17 LAB
ALBUMIN SERPL-MCNC: 4.3 G/DL (ref 3.5–5.2)
ALBUMIN/GLOB SERPL: 1.5 G/DL
ALP SERPL-CCNC: 47 U/L (ref 39–117)
ALT SERPL W P-5'-P-CCNC: 17 U/L (ref 1–41)
ANION GAP SERPL CALCULATED.3IONS-SCNC: 14 MMOL/L
AST SERPL-CCNC: 13 U/L (ref 1–40)
BASOPHILS # BLD AUTO: 0.07 10*3/MM3 (ref 0–0.2)
BASOPHILS NFR BLD AUTO: 0.7 % (ref 0–1.5)
BILIRUB SERPL-MCNC: 0.2 MG/DL (ref 0.2–1.2)
BUN BLD-MCNC: 20 MG/DL (ref 8–23)
BUN/CREAT SERPL: 18.3 (ref 7–25)
CALCIUM SPEC-SCNC: 9.4 MG/DL (ref 8.6–10.5)
CHLORIDE SERPL-SCNC: 95 MMOL/L (ref 98–107)
CO2 SERPL-SCNC: 25 MMOL/L (ref 22–29)
CREAT BLD-MCNC: 1.09 MG/DL (ref 0.76–1.27)
DEPRECATED RDW RBC AUTO: 40.3 FL (ref 37–54)
EOSINOPHIL # BLD AUTO: 0.11 10*3/MM3 (ref 0–0.4)
EOSINOPHIL NFR BLD AUTO: 1 % (ref 0.3–6.2)
ERYTHROCYTE [DISTWIDTH] IN BLOOD BY AUTOMATED COUNT: 13.9 % (ref 12.3–15.4)
GFR SERPL CREATININE-BSD FRML MDRD: 69 ML/MIN/1.73
GLOBULIN UR ELPH-MCNC: 2.9 GM/DL
GLUCOSE BLD-MCNC: 182 MG/DL (ref 65–99)
HCT VFR BLD AUTO: 44.6 % (ref 37.5–51)
HGB BLD-MCNC: 14.9 G/DL (ref 13–17.7)
HOLD SPECIMEN: NORMAL
HOLD SPECIMEN: NORMAL
IMM GRANULOCYTES # BLD AUTO: 0.03 10*3/MM3 (ref 0–0.05)
IMM GRANULOCYTES NFR BLD AUTO: 0.3 % (ref 0–0.5)
LYMPHOCYTES # BLD AUTO: 2.38 10*3/MM3 (ref 0.7–3.1)
LYMPHOCYTES NFR BLD AUTO: 22.5 % (ref 19.6–45.3)
MCH RBC QN AUTO: 27.4 PG (ref 26.6–33)
MCHC RBC AUTO-ENTMCNC: 33.4 G/DL (ref 31.5–35.7)
MCV RBC AUTO: 82.1 FL (ref 79–97)
MONOCYTES # BLD AUTO: 0.83 10*3/MM3 (ref 0.1–0.9)
MONOCYTES NFR BLD AUTO: 7.8 % (ref 5–12)
NEUTROPHILS # BLD AUTO: 7.16 10*3/MM3 (ref 1.7–7)
NEUTROPHILS NFR BLD AUTO: 67.7 % (ref 42.7–76)
NRBC BLD AUTO-RTO: 0 /100 WBC (ref 0–0.2)
NT-PROBNP SERPL-MCNC: 117.7 PG/ML (ref 5–900)
PLATELET # BLD AUTO: 246 10*3/MM3 (ref 140–450)
PMV BLD AUTO: 10.9 FL (ref 6–12)
POTASSIUM BLD-SCNC: 4.3 MMOL/L (ref 3.5–5.2)
PROT SERPL-MCNC: 7.2 G/DL (ref 6–8.5)
RBC # BLD AUTO: 5.43 10*6/MM3 (ref 4.14–5.8)
SODIUM BLD-SCNC: 134 MMOL/L (ref 136–145)
TROPONIN T SERPL-MCNC: 0.03 NG/ML (ref 0–0.03)
TROPONIN T SERPL-MCNC: 0.04 NG/ML (ref 0–0.03)
WBC NRBC COR # BLD: 10.58 10*3/MM3 (ref 3.4–10.8)
WHOLE BLOOD HOLD SPECIMEN: NORMAL
WHOLE BLOOD HOLD SPECIMEN: NORMAL

## 2019-06-17 PROCEDURE — 80053 COMPREHEN METABOLIC PANEL: CPT

## 2019-06-17 PROCEDURE — 99284 EMERGENCY DEPT VISIT MOD MDM: CPT

## 2019-06-17 PROCEDURE — 70360 X-RAY EXAM OF NECK: CPT

## 2019-06-17 PROCEDURE — 84484 ASSAY OF TROPONIN QUANT: CPT | Performed by: EMERGENCY MEDICINE

## 2019-06-17 PROCEDURE — 84484 ASSAY OF TROPONIN QUANT: CPT

## 2019-06-17 PROCEDURE — 83880 ASSAY OF NATRIURETIC PEPTIDE: CPT

## 2019-06-17 PROCEDURE — 93005 ELECTROCARDIOGRAM TRACING: CPT | Performed by: EMERGENCY MEDICINE

## 2019-06-17 PROCEDURE — 93005 ELECTROCARDIOGRAM TRACING: CPT | Performed by: FAMILY MEDICINE

## 2019-06-17 PROCEDURE — 71045 X-RAY EXAM CHEST 1 VIEW: CPT

## 2019-06-17 PROCEDURE — 93010 ELECTROCARDIOGRAM REPORT: CPT | Performed by: INTERNAL MEDICINE

## 2019-06-17 PROCEDURE — 85025 COMPLETE CBC W/AUTO DIFF WBC: CPT

## 2019-06-17 RX ORDER — SODIUM CHLORIDE 0.9 % (FLUSH) 0.9 %
10 SYRINGE (ML) INJECTION AS NEEDED
Status: DISCONTINUED | OUTPATIENT
Start: 2019-06-17 | End: 2019-06-18 | Stop reason: HOSPADM

## 2019-06-17 RX ORDER — ASPIRIN 325 MG
325 TABLET ORAL ONCE
Status: COMPLETED | OUTPATIENT
Start: 2019-06-17 | End: 2019-06-17

## 2019-06-17 RX ORDER — SUCRALFATE 1 G/1
1 TABLET ORAL 4 TIMES DAILY
Qty: 60 TABLET | Refills: 0 | Status: SHIPPED | OUTPATIENT
Start: 2019-06-17 | End: 2019-09-04 | Stop reason: SDUPTHER

## 2019-06-17 RX ADMIN — ASPIRIN 325 MG: 325 TABLET, COATED ORAL at 18:29

## 2019-06-18 ENCOUNTER — HOSPITAL ENCOUNTER (OUTPATIENT)
Facility: HOSPITAL | Age: 64
Setting detail: OBSERVATION
Discharge: HOME | End: 2019-06-19
Attending: EMERGENCY MEDICINE | Admitting: INTERNAL MEDICINE

## 2019-06-18 ENCOUNTER — APPOINTMENT (OUTPATIENT)
Dept: GENERAL RADIOLOGY | Facility: HOSPITAL | Age: 64
End: 2019-06-18

## 2019-06-18 DIAGNOSIS — R77.8 ELEVATED TROPONIN I LEVEL: ICD-10-CM

## 2019-06-18 DIAGNOSIS — R07.9 CHEST PAIN, UNSPECIFIED TYPE: Primary | ICD-10-CM

## 2019-06-18 PROBLEM — I21.4 NSTEMI (NON-ST ELEVATED MYOCARDIAL INFARCTION): Status: RESOLVED | Noted: 2018-10-10 | Resolved: 2019-06-18

## 2019-06-18 PROBLEM — I10 ESSENTIAL HYPERTENSION: Status: ACTIVE | Noted: 2019-06-18

## 2019-06-18 PROBLEM — I16.0 HYPERTENSIVE URGENCY: Status: RESOLVED | Noted: 2018-10-10 | Resolved: 2019-06-18

## 2019-06-18 LAB
ACT BLD: 138 SECONDS (ref 82–152)
ACT BLD: 159 SECONDS (ref 82–152)
ACT BLD: 163 SECONDS (ref 82–152)
ALBUMIN SERPL-MCNC: 4.3 G/DL (ref 3.5–5.2)
ALBUMIN/GLOB SERPL: 1.4 G/DL
ALP SERPL-CCNC: 46 U/L (ref 39–117)
ALT SERPL W P-5'-P-CCNC: 17 U/L (ref 1–41)
AMPHET+METHAMPHET UR QL: NEGATIVE
ANION GAP SERPL CALCULATED.3IONS-SCNC: 16 MMOL/L
ANION GAP SERPL CALCULATED.3IONS-SCNC: 16 MMOL/L
AST SERPL-CCNC: 15 U/L (ref 1–40)
BARBITURATES UR QL SCN: NEGATIVE
BASOPHILS # BLD AUTO: 0.05 10*3/MM3 (ref 0–0.2)
BASOPHILS # BLD AUTO: 0.06 10*3/MM3 (ref 0–0.2)
BASOPHILS NFR BLD AUTO: 0.5 % (ref 0–1.5)
BASOPHILS NFR BLD AUTO: 0.6 % (ref 0–1.5)
BENZODIAZ UR QL SCN: NEGATIVE
BILIRUB SERPL-MCNC: 0.2 MG/DL (ref 0.2–1.2)
BILIRUB UR QL STRIP: NEGATIVE
BUN BLD-MCNC: 16 MG/DL (ref 8–23)
BUN BLD-MCNC: 18 MG/DL (ref 8–23)
BUN/CREAT SERPL: 18.2 (ref 7–25)
BUN/CREAT SERPL: 18.8 (ref 7–25)
CALCIUM SPEC-SCNC: 9.3 MG/DL (ref 8.6–10.5)
CALCIUM SPEC-SCNC: 9.4 MG/DL (ref 8.6–10.5)
CANNABINOIDS SERPL QL: NEGATIVE
CHLORIDE SERPL-SCNC: 94 MMOL/L (ref 98–107)
CHLORIDE SERPL-SCNC: 94 MMOL/L (ref 98–107)
CLARITY UR: CLEAR
CO2 SERPL-SCNC: 21 MMOL/L (ref 22–29)
CO2 SERPL-SCNC: 21 MMOL/L (ref 22–29)
COCAINE UR QL: NEGATIVE
COLOR UR: YELLOW
CREAT BLD-MCNC: 0.88 MG/DL (ref 0.76–1.27)
CREAT BLD-MCNC: 0.96 MG/DL (ref 0.76–1.27)
DEPRECATED RDW RBC AUTO: 40.2 FL (ref 37–54)
DEPRECATED RDW RBC AUTO: 40.2 FL (ref 37–54)
EOSINOPHIL # BLD AUTO: 0.05 10*3/MM3 (ref 0–0.4)
EOSINOPHIL # BLD AUTO: 0.07 10*3/MM3 (ref 0–0.4)
EOSINOPHIL NFR BLD AUTO: 0.5 % (ref 0.3–6.2)
EOSINOPHIL NFR BLD AUTO: 0.6 % (ref 0.3–6.2)
ERYTHROCYTE [DISTWIDTH] IN BLOOD BY AUTOMATED COUNT: 13.8 % (ref 12.3–15.4)
ERYTHROCYTE [DISTWIDTH] IN BLOOD BY AUTOMATED COUNT: 13.9 % (ref 12.3–15.4)
GFR SERPL CREATININE-BSD FRML MDRD: 79 ML/MIN/1.73
GFR SERPL CREATININE-BSD FRML MDRD: 88 ML/MIN/1.73
GLOBULIN UR ELPH-MCNC: 3.1 GM/DL
GLUCOSE BLD-MCNC: 155 MG/DL (ref 65–99)
GLUCOSE BLD-MCNC: 194 MG/DL (ref 65–99)
GLUCOSE BLDC GLUCOMTR-MCNC: 151 MG/DL (ref 70–130)
GLUCOSE BLDC GLUCOMTR-MCNC: 154 MG/DL (ref 70–130)
GLUCOSE BLDC GLUCOMTR-MCNC: 158 MG/DL (ref 70–130)
GLUCOSE BLDC GLUCOMTR-MCNC: 170 MG/DL (ref 70–130)
GLUCOSE UR STRIP-MCNC: ABNORMAL MG/DL
HCT VFR BLD AUTO: 44.7 % (ref 37.5–51)
HCT VFR BLD AUTO: 45.1 % (ref 37.5–51)
HGB BLD-MCNC: 15.2 G/DL (ref 13–17.7)
HGB BLD-MCNC: 15.3 G/DL (ref 13–17.7)
HGB UR QL STRIP.AUTO: NEGATIVE
HOLD SPECIMEN: NORMAL
IMM GRANULOCYTES # BLD AUTO: 0.03 10*3/MM3 (ref 0–0.05)
IMM GRANULOCYTES # BLD AUTO: 0.06 10*3/MM3 (ref 0–0.05)
IMM GRANULOCYTES NFR BLD AUTO: 0.3 % (ref 0–0.5)
IMM GRANULOCYTES NFR BLD AUTO: 0.6 % (ref 0–0.5)
INR PPP: 0.96 (ref 0.8–1.2)
KETONES UR QL STRIP: NEGATIVE
LEUKOCYTE ESTERASE UR QL STRIP.AUTO: NEGATIVE
LYMPHOCYTES # BLD AUTO: 2 10*3/MM3 (ref 0.7–3.1)
LYMPHOCYTES # BLD AUTO: 2.11 10*3/MM3 (ref 0.7–3.1)
LYMPHOCYTES NFR BLD AUTO: 18.3 % (ref 19.6–45.3)
LYMPHOCYTES NFR BLD AUTO: 19.5 % (ref 19.6–45.3)
MCH RBC QN AUTO: 27.6 PG (ref 26.6–33)
MCH RBC QN AUTO: 27.6 PG (ref 26.6–33)
MCHC RBC AUTO-ENTMCNC: 33.9 G/DL (ref 31.5–35.7)
MCHC RBC AUTO-ENTMCNC: 34 G/DL (ref 31.5–35.7)
MCV RBC AUTO: 81.3 FL (ref 79–97)
MCV RBC AUTO: 81.4 FL (ref 79–97)
METHADONE UR QL SCN: NEGATIVE
MONOCYTES # BLD AUTO: 0.79 10*3/MM3 (ref 0.1–0.9)
MONOCYTES # BLD AUTO: 0.86 10*3/MM3 (ref 0.1–0.9)
MONOCYTES NFR BLD AUTO: 7.3 % (ref 5–12)
MONOCYTES NFR BLD AUTO: 7.9 % (ref 5–12)
NEUTROPHILS # BLD AUTO: 7.73 10*3/MM3 (ref 1.7–7)
NEUTROPHILS # BLD AUTO: 7.93 10*3/MM3 (ref 1.7–7)
NEUTROPHILS NFR BLD AUTO: 71.4 % (ref 42.7–76)
NEUTROPHILS NFR BLD AUTO: 72.5 % (ref 42.7–76)
NITRITE UR QL STRIP: NEGATIVE
NRBC BLD AUTO-RTO: 0 /100 WBC (ref 0–0.2)
NRBC BLD AUTO-RTO: 0 /100 WBC (ref 0–0.2)
NT-PROBNP SERPL-MCNC: 124.5 PG/ML (ref 5–900)
OPIATES UR QL: NEGATIVE
OXYCODONE UR QL SCN: NEGATIVE
PH UR STRIP.AUTO: <=5 [PH] (ref 5–9)
PLATELET # BLD AUTO: 214 10*3/MM3 (ref 140–450)
PLATELET # BLD AUTO: 256 10*3/MM3 (ref 140–450)
PMV BLD AUTO: 10.7 FL (ref 6–12)
PMV BLD AUTO: 11.2 FL (ref 6–12)
POTASSIUM BLD-SCNC: 4.3 MMOL/L (ref 3.5–5.2)
POTASSIUM BLD-SCNC: 4.3 MMOL/L (ref 3.5–5.2)
PROT SERPL-MCNC: 7.4 G/DL (ref 6–8.5)
PROT UR QL STRIP: NEGATIVE
PROTHROMBIN TIME: 12.6 SECONDS (ref 11.1–15.3)
RBC # BLD AUTO: 5.5 10*6/MM3 (ref 4.14–5.8)
RBC # BLD AUTO: 5.54 10*6/MM3 (ref 4.14–5.8)
SODIUM BLD-SCNC: 131 MMOL/L (ref 136–145)
SODIUM BLD-SCNC: 131 MMOL/L (ref 136–145)
SP GR UR STRIP: 1.01 (ref 1–1.03)
TROPONIN T SERPL-MCNC: 0.03 NG/ML (ref 0–0.03)
TROPONIN T SERPL-MCNC: 0.03 NG/ML (ref 0–0.03)
TROPONIN T SERPL-MCNC: 0.05 NG/ML (ref 0–0.03)
UROBILINOGEN UR QL STRIP: ABNORMAL
WBC NRBC COR # BLD: 10.82 10*3/MM3 (ref 3.4–10.8)
WBC NRBC COR # BLD: 10.92 10*3/MM3 (ref 3.4–10.8)
WHOLE BLOOD HOLD SPECIMEN: NORMAL

## 2019-06-18 PROCEDURE — 80048 BASIC METABOLIC PNL TOTAL CA: CPT | Performed by: INTERNAL MEDICINE

## 2019-06-18 PROCEDURE — G0378 HOSPITAL OBSERVATION PER HR: HCPCS

## 2019-06-18 PROCEDURE — C1760 CLOSURE DEV, VASC: HCPCS | Performed by: INTERNAL MEDICINE

## 2019-06-18 PROCEDURE — 96375 TX/PRO/DX INJ NEW DRUG ADDON: CPT

## 2019-06-18 PROCEDURE — 80307 DRUG TEST PRSMV CHEM ANLYZR: CPT | Performed by: EMERGENCY MEDICINE

## 2019-06-18 PROCEDURE — 25010000002 FENTANYL CITRATE (PF) 100 MCG/2ML SOLUTION: Performed by: INTERNAL MEDICINE

## 2019-06-18 PROCEDURE — C1887 CATHETER, GUIDING: HCPCS | Performed by: INTERNAL MEDICINE

## 2019-06-18 PROCEDURE — 93005 ELECTROCARDIOGRAM TRACING: CPT | Performed by: EMERGENCY MEDICINE

## 2019-06-18 PROCEDURE — 83880 ASSAY OF NATRIURETIC PEPTIDE: CPT | Performed by: EMERGENCY MEDICINE

## 2019-06-18 PROCEDURE — 63710000001 INSULIN ASPART PER 5 UNITS: Performed by: FAMILY MEDICINE

## 2019-06-18 PROCEDURE — C1874 STENT, COATED/COV W/DEL SYS: HCPCS | Performed by: INTERNAL MEDICINE

## 2019-06-18 PROCEDURE — 25010000002 MIDAZOLAM PER 1 MG: Performed by: INTERNAL MEDICINE

## 2019-06-18 PROCEDURE — 85025 COMPLETE CBC W/AUTO DIFF WBC: CPT | Performed by: INTERNAL MEDICINE

## 2019-06-18 PROCEDURE — 85610 PROTHROMBIN TIME: CPT | Performed by: INTERNAL MEDICINE

## 2019-06-18 PROCEDURE — 25010000002 HEPARIN (PORCINE) PER 1000 UNITS: Performed by: FAMILY MEDICINE

## 2019-06-18 PROCEDURE — 25010000002 BIVALIRUDIN TRIFLUOROACETATE 250 MG RECONSTITUTED SOLUTION: Performed by: INTERNAL MEDICINE

## 2019-06-18 PROCEDURE — 93455 CORONARY ART/GRFT ANGIO S&I: CPT | Performed by: INTERNAL MEDICINE

## 2019-06-18 PROCEDURE — 99284 EMERGENCY DEPT VISIT MOD MDM: CPT

## 2019-06-18 PROCEDURE — 25010000002 BIVALIRUDIN TRIFLUOROACETATE 250 MG RECONSTITUTED SOLUTION 1 EACH VIAL: Performed by: INTERNAL MEDICINE

## 2019-06-18 PROCEDURE — C1769 GUIDE WIRE: HCPCS | Performed by: INTERNAL MEDICINE

## 2019-06-18 PROCEDURE — 84484 ASSAY OF TROPONIN QUANT: CPT | Performed by: NURSE PRACTITIONER

## 2019-06-18 PROCEDURE — C9604 PERC D-E COR REVASC T CABG S: HCPCS | Performed by: INTERNAL MEDICINE

## 2019-06-18 PROCEDURE — C1725 CATH, TRANSLUMIN NON-LASER: HCPCS | Performed by: INTERNAL MEDICINE

## 2019-06-18 PROCEDURE — 96374 THER/PROPH/DIAG INJ IV PUSH: CPT

## 2019-06-18 PROCEDURE — 80053 COMPREHEN METABOLIC PANEL: CPT | Performed by: EMERGENCY MEDICINE

## 2019-06-18 PROCEDURE — 82962 GLUCOSE BLOOD TEST: CPT

## 2019-06-18 PROCEDURE — 85025 COMPLETE CBC W/AUTO DIFF WBC: CPT | Performed by: EMERGENCY MEDICINE

## 2019-06-18 PROCEDURE — 85347 COAGULATION TIME ACTIVATED: CPT

## 2019-06-18 PROCEDURE — C1894 INTRO/SHEATH, NON-LASER: HCPCS | Performed by: INTERNAL MEDICINE

## 2019-06-18 PROCEDURE — 0 IOPAMIDOL PER 1 ML: Performed by: INTERNAL MEDICINE

## 2019-06-18 PROCEDURE — 81003 URINALYSIS AUTO W/O SCOPE: CPT | Performed by: EMERGENCY MEDICINE

## 2019-06-18 PROCEDURE — 93010 ELECTROCARDIOGRAM REPORT: CPT | Performed by: INTERNAL MEDICINE

## 2019-06-18 PROCEDURE — 84484 ASSAY OF TROPONIN QUANT: CPT | Performed by: EMERGENCY MEDICINE

## 2019-06-18 PROCEDURE — 25010000002 ONDANSETRON PER 1 MG: Performed by: FAMILY MEDICINE

## 2019-06-18 PROCEDURE — 96372 THER/PROPH/DIAG INJ SC/IM: CPT

## 2019-06-18 DEVICE — XIENCE SIERRA™ EVEROLIMUS ELUTING CORONARY STENT SYSTEM 3.50 MM X 23 MM / RAPID-EXCHANGE
Type: IMPLANTABLE DEVICE | Site: CORONARY | Status: FUNCTIONAL
Brand: XIENCE SIERRA™

## 2019-06-18 DEVICE — XIENCE SIERRA™ EVEROLIMUS ELUTING CORONARY STENT SYSTEM 4.00 MM X 12 MM / RAPID-EXCHANGE
Type: IMPLANTABLE DEVICE | Site: CORONARY | Status: FUNCTIONAL
Brand: XIENCE SIERRA™

## 2019-06-18 RX ORDER — ATORVASTATIN CALCIUM 40 MG/1
80 TABLET, FILM COATED ORAL NIGHTLY
Status: DISCONTINUED | OUTPATIENT
Start: 2019-06-18 | End: 2019-06-19 | Stop reason: HOSPADM

## 2019-06-18 RX ORDER — SODIUM CHLORIDE 9 MG/ML
100 INJECTION, SOLUTION INTRAVENOUS CONTINUOUS
Status: DISCONTINUED | OUTPATIENT
Start: 2019-06-18 | End: 2019-06-19 | Stop reason: HOSPADM

## 2019-06-18 RX ORDER — ASPIRIN 81 MG/1
81 TABLET ORAL DAILY
Status: DISCONTINUED | OUTPATIENT
Start: 2019-06-18 | End: 2019-06-19 | Stop reason: HOSPADM

## 2019-06-18 RX ORDER — SODIUM CHLORIDE 0.9 % (FLUSH) 0.9 %
10 SYRINGE (ML) INJECTION EVERY 12 HOURS SCHEDULED
Status: DISCONTINUED | OUTPATIENT
Start: 2019-06-18 | End: 2019-06-19 | Stop reason: HOSPADM

## 2019-06-18 RX ORDER — SODIUM CHLORIDE 0.9 % (FLUSH) 0.9 %
3 SYRINGE (ML) INJECTION EVERY 12 HOURS SCHEDULED
Status: DISCONTINUED | OUTPATIENT
Start: 2019-06-18 | End: 2019-06-18 | Stop reason: HOSPADM

## 2019-06-18 RX ORDER — PRASUGREL 10 MG/1
10 TABLET, FILM COATED ORAL DAILY
Status: DISCONTINUED | OUTPATIENT
Start: 2019-06-19 | End: 2019-06-19 | Stop reason: HOSPADM

## 2019-06-18 RX ORDER — LIDOCAINE HYDROCHLORIDE 20 MG/ML
INJECTION, SOLUTION INFILTRATION; PERINEURAL AS NEEDED
Status: DISCONTINUED | OUTPATIENT
Start: 2019-06-18 | End: 2019-06-18 | Stop reason: HOSPADM

## 2019-06-18 RX ORDER — FAMOTIDINE 10 MG/ML
20 INJECTION, SOLUTION INTRAVENOUS DAILY
Status: DISCONTINUED | OUTPATIENT
Start: 2019-06-18 | End: 2019-06-18 | Stop reason: CLARIF

## 2019-06-18 RX ORDER — ACETAMINOPHEN 325 MG/1
650 TABLET ORAL EVERY 4 HOURS PRN
Status: DISCONTINUED | OUTPATIENT
Start: 2019-06-18 | End: 2019-06-19 | Stop reason: HOSPADM

## 2019-06-18 RX ORDER — FAMOTIDINE 20 MG/1
20 TABLET, FILM COATED ORAL DAILY
Status: DISCONTINUED | OUTPATIENT
Start: 2019-06-19 | End: 2019-06-19 | Stop reason: HOSPADM

## 2019-06-18 RX ORDER — SODIUM CHLORIDE 0.9 % (FLUSH) 0.9 %
3 SYRINGE (ML) INJECTION EVERY 12 HOURS SCHEDULED
Status: DISCONTINUED | OUTPATIENT
Start: 2019-06-18 | End: 2019-06-18

## 2019-06-18 RX ORDER — SODIUM CHLORIDE 0.9 % (FLUSH) 0.9 %
10 SYRINGE (ML) INJECTION AS NEEDED
Status: DISCONTINUED | OUTPATIENT
Start: 2019-06-18 | End: 2019-06-19 | Stop reason: HOSPADM

## 2019-06-18 RX ORDER — BIVALIRUDIN 250 MG/5ML
INJECTION, POWDER, LYOPHILIZED, FOR SOLUTION INTRAVENOUS AS NEEDED
Status: DISCONTINUED | OUTPATIENT
Start: 2019-06-18 | End: 2019-06-18 | Stop reason: HOSPADM

## 2019-06-18 RX ORDER — SODIUM CHLORIDE 0.9 % (FLUSH) 0.9 %
3-10 SYRINGE (ML) INJECTION AS NEEDED
Status: DISCONTINUED | OUTPATIENT
Start: 2019-06-18 | End: 2019-06-18

## 2019-06-18 RX ORDER — SODIUM CHLORIDE 9 MG/ML
250 INJECTION, SOLUTION INTRAVENOUS ONCE AS NEEDED
Status: DISCONTINUED | OUTPATIENT
Start: 2019-06-18 | End: 2019-06-19 | Stop reason: HOSPADM

## 2019-06-18 RX ORDER — ASPIRIN 81 MG/1
81 TABLET, CHEWABLE ORAL NIGHTLY
COMMUNITY

## 2019-06-18 RX ORDER — CLOPIDOGREL BISULFATE 75 MG/1
75 TABLET ORAL DAILY
Status: DISCONTINUED | OUTPATIENT
Start: 2019-06-18 | End: 2019-06-18

## 2019-06-18 RX ORDER — SODIUM CHLORIDE 9 MG/ML
1-3 INJECTION, SOLUTION INTRAVENOUS CONTINUOUS
Status: DISCONTINUED | OUTPATIENT
Start: 2019-06-18 | End: 2019-06-18

## 2019-06-18 RX ORDER — MIDAZOLAM HYDROCHLORIDE 1 MG/ML
INJECTION INTRAMUSCULAR; INTRAVENOUS AS NEEDED
Status: DISCONTINUED | OUTPATIENT
Start: 2019-06-18 | End: 2019-06-18 | Stop reason: HOSPADM

## 2019-06-18 RX ORDER — CARVEDILOL 3.12 MG/1
3.12 TABLET ORAL 2 TIMES DAILY WITH MEALS
Status: DISCONTINUED | OUTPATIENT
Start: 2019-06-18 | End: 2019-06-19 | Stop reason: HOSPADM

## 2019-06-18 RX ORDER — ONDANSETRON 2 MG/ML
4 INJECTION INTRAMUSCULAR; INTRAVENOUS EVERY 6 HOURS PRN
Status: DISCONTINUED | OUTPATIENT
Start: 2019-06-18 | End: 2019-06-19 | Stop reason: HOSPADM

## 2019-06-18 RX ORDER — LEVOTHYROXINE SODIUM 0.15 MG/1
150 TABLET ORAL NIGHTLY
Status: DISCONTINUED | OUTPATIENT
Start: 2019-06-19 | End: 2019-06-19 | Stop reason: HOSPADM

## 2019-06-18 RX ORDER — DEXTROSE MONOHYDRATE 25 G/50ML
25 INJECTION, SOLUTION INTRAVENOUS
Status: DISCONTINUED | OUTPATIENT
Start: 2019-06-18 | End: 2019-06-19 | Stop reason: HOSPADM

## 2019-06-18 RX ORDER — LEVOTHYROXINE SODIUM 112 UG/1
112 TABLET ORAL
Status: DISCONTINUED | OUTPATIENT
Start: 2019-06-18 | End: 2019-06-18

## 2019-06-18 RX ORDER — SODIUM CHLORIDE 0.9 % (FLUSH) 0.9 %
1-10 SYRINGE (ML) INJECTION AS NEEDED
Status: DISCONTINUED | OUTPATIENT
Start: 2019-06-18 | End: 2019-06-18 | Stop reason: HOSPADM

## 2019-06-18 RX ORDER — PRASUGREL 5 MG/1
TABLET, FILM COATED ORAL AS NEEDED
Status: DISCONTINUED | OUTPATIENT
Start: 2019-06-18 | End: 2019-06-18 | Stop reason: HOSPADM

## 2019-06-18 RX ORDER — LEVOTHYROXINE SODIUM 0.15 MG/1
150 TABLET ORAL NIGHTLY
Status: DISCONTINUED | OUTPATIENT
Start: 2019-06-19 | End: 2019-06-18

## 2019-06-18 RX ORDER — NICOTINE POLACRILEX 4 MG
15 LOZENGE BUCCAL
Status: DISCONTINUED | OUTPATIENT
Start: 2019-06-18 | End: 2019-06-19 | Stop reason: HOSPADM

## 2019-06-18 RX ORDER — FENTANYL CITRATE 50 UG/ML
INJECTION, SOLUTION INTRAMUSCULAR; INTRAVENOUS AS NEEDED
Status: DISCONTINUED | OUTPATIENT
Start: 2019-06-18 | End: 2019-06-18 | Stop reason: HOSPADM

## 2019-06-18 RX ORDER — HEPARIN SODIUM 5000 [USP'U]/ML
5000 INJECTION, SOLUTION INTRAVENOUS; SUBCUTANEOUS EVERY 8 HOURS SCHEDULED
Status: DISCONTINUED | OUTPATIENT
Start: 2019-06-18 | End: 2019-06-18

## 2019-06-18 RX ADMIN — SODIUM CHLORIDE 1 ML/KG/HR: 9 INJECTION, SOLUTION INTRAVENOUS at 11:52

## 2019-06-18 RX ADMIN — ONDANSETRON 4 MG: 2 INJECTION INTRAMUSCULAR; INTRAVENOUS at 06:08

## 2019-06-18 RX ADMIN — NITROGLYCERIN 1 INCH: 20 OINTMENT TOPICAL at 02:57

## 2019-06-18 RX ADMIN — ASPIRIN 81 MG: 81 TABLET, COATED ORAL at 14:49

## 2019-06-18 RX ADMIN — LEVOTHYROXINE SODIUM 112 MCG: 112 TABLET ORAL at 14:49

## 2019-06-18 RX ADMIN — INSULIN ASPART 3 UNITS: 100 INJECTION, SOLUTION INTRAVENOUS; SUBCUTANEOUS at 10:27

## 2019-06-18 RX ADMIN — FAMOTIDINE 20 MG: 10 INJECTION INTRAVENOUS at 08:04

## 2019-06-18 RX ADMIN — SODIUM CHLORIDE 100 ML/HR: 9 INJECTION, SOLUTION INTRAVENOUS at 21:26

## 2019-06-18 RX ADMIN — ATORVASTATIN CALCIUM 80 MG: 40 TABLET, FILM COATED ORAL at 21:24

## 2019-06-18 RX ADMIN — INSULIN ASPART 3 UNITS: 100 INJECTION, SOLUTION INTRAVENOUS; SUBCUTANEOUS at 08:03

## 2019-06-18 RX ADMIN — SODIUM CHLORIDE, PRESERVATIVE FREE 3 ML: 5 INJECTION INTRAVENOUS at 08:04

## 2019-06-18 RX ADMIN — ACETAMINOPHEN 650 MG: 325 TABLET, FILM COATED ORAL at 08:03

## 2019-06-18 RX ADMIN — INSULIN ASPART 3 UNITS: 100 INJECTION, SOLUTION INTRAVENOUS; SUBCUTANEOUS at 21:21

## 2019-06-18 RX ADMIN — HEPARIN SODIUM 5000 UNITS: 5000 INJECTION, SOLUTION INTRAVENOUS; SUBCUTANEOUS at 06:09

## 2019-06-18 RX ADMIN — CARVEDILOL 3.12 MG: 3.12 TABLET, FILM COATED ORAL at 21:23

## 2019-06-18 RX ADMIN — ACETAMINOPHEN 650 MG: 325 TABLET, FILM COATED ORAL at 22:25

## 2019-06-19 VITALS
RESPIRATION RATE: 18 BRPM | TEMPERATURE: 97.1 F | DIASTOLIC BLOOD PRESSURE: 62 MMHG | BODY MASS INDEX: 37.28 KG/M2 | HEIGHT: 70 IN | HEART RATE: 74 BPM | SYSTOLIC BLOOD PRESSURE: 125 MMHG | WEIGHT: 260.4 LBS | OXYGEN SATURATION: 93 %

## 2019-06-19 LAB
ANION GAP SERPL CALCULATED.3IONS-SCNC: 12 MMOL/L
BASOPHILS # BLD AUTO: 0.06 10*3/MM3 (ref 0–0.2)
BASOPHILS NFR BLD AUTO: 0.6 % (ref 0–1.5)
BUN BLD-MCNC: 12 MG/DL (ref 8–23)
BUN/CREAT SERPL: 14.6 (ref 7–25)
CALCIUM SPEC-SCNC: 8.9 MG/DL (ref 8.6–10.5)
CHLORIDE SERPL-SCNC: 98 MMOL/L (ref 98–107)
CO2 SERPL-SCNC: 24 MMOL/L (ref 22–29)
CREAT BLD-MCNC: 0.82 MG/DL (ref 0.76–1.27)
DEPRECATED RDW RBC AUTO: 41.7 FL (ref 37–54)
EOSINOPHIL # BLD AUTO: 0.12 10*3/MM3 (ref 0–0.4)
EOSINOPHIL NFR BLD AUTO: 1.2 % (ref 0.3–6.2)
ERYTHROCYTE [DISTWIDTH] IN BLOOD BY AUTOMATED COUNT: 14.1 % (ref 12.3–15.4)
GFR SERPL CREATININE-BSD FRML MDRD: 95 ML/MIN/1.73
GLUCOSE BLD-MCNC: 135 MG/DL (ref 65–99)
GLUCOSE BLDC GLUCOMTR-MCNC: 123 MG/DL (ref 70–130)
HCT VFR BLD AUTO: 42.7 % (ref 37.5–51)
HGB BLD-MCNC: 14.2 G/DL (ref 13–17.7)
IMM GRANULOCYTES # BLD AUTO: 0.02 10*3/MM3 (ref 0–0.05)
IMM GRANULOCYTES NFR BLD AUTO: 0.2 % (ref 0–0.5)
LYMPHOCYTES # BLD AUTO: 2.14 10*3/MM3 (ref 0.7–3.1)
LYMPHOCYTES NFR BLD AUTO: 21.6 % (ref 19.6–45.3)
MCH RBC QN AUTO: 27.4 PG (ref 26.6–33)
MCHC RBC AUTO-ENTMCNC: 33.3 G/DL (ref 31.5–35.7)
MCV RBC AUTO: 82.4 FL (ref 79–97)
MONOCYTES # BLD AUTO: 0.87 10*3/MM3 (ref 0.1–0.9)
MONOCYTES NFR BLD AUTO: 8.8 % (ref 5–12)
NEUTROPHILS # BLD AUTO: 6.71 10*3/MM3 (ref 1.7–7)
NEUTROPHILS NFR BLD AUTO: 67.6 % (ref 42.7–76)
NRBC BLD AUTO-RTO: 0 /100 WBC (ref 0–0.2)
PLATELET # BLD AUTO: 197 10*3/MM3 (ref 140–450)
PMV BLD AUTO: 11.5 FL (ref 6–12)
POTASSIUM BLD-SCNC: 4.5 MMOL/L (ref 3.5–5.2)
RBC # BLD AUTO: 5.18 10*6/MM3 (ref 4.14–5.8)
SODIUM BLD-SCNC: 134 MMOL/L (ref 136–145)
WBC NRBC COR # BLD: 9.92 10*3/MM3 (ref 3.4–10.8)

## 2019-06-19 PROCEDURE — 80048 BASIC METABOLIC PNL TOTAL CA: CPT | Performed by: FAMILY MEDICINE

## 2019-06-19 PROCEDURE — 82962 GLUCOSE BLOOD TEST: CPT

## 2019-06-19 PROCEDURE — G0378 HOSPITAL OBSERVATION PER HR: HCPCS

## 2019-06-19 PROCEDURE — 85025 COMPLETE CBC W/AUTO DIFF WBC: CPT | Performed by: FAMILY MEDICINE

## 2019-06-19 RX ORDER — PRASUGREL 10 MG/1
10 TABLET, FILM COATED ORAL DAILY
Qty: 30 TABLET | Refills: 1 | Status: SHIPPED | OUTPATIENT
Start: 2019-06-20 | End: 2019-06-19 | Stop reason: HOSPADM

## 2019-06-19 RX ORDER — CARVEDILOL 3.12 MG/1
3.12 TABLET ORAL 2 TIMES DAILY WITH MEALS
Qty: 60 TABLET | Refills: 0 | Status: SHIPPED | OUTPATIENT
Start: 2019-06-19 | End: 2019-09-04 | Stop reason: SDUPTHER

## 2019-06-19 RX ADMIN — CARVEDILOL 3.12 MG: 3.12 TABLET, FILM COATED ORAL at 08:23

## 2019-06-19 RX ADMIN — PRASUGREL 10 MG: 10 TABLET, FILM COATED ORAL at 08:23

## 2019-06-19 RX ADMIN — ASPIRIN 81 MG: 81 TABLET, COATED ORAL at 08:23

## 2019-06-19 RX ADMIN — FAMOTIDINE 20 MG: 20 TABLET, FILM COATED ORAL at 08:23

## 2019-06-19 RX ADMIN — LEVOTHYROXINE SODIUM 150 MCG: 150 TABLET ORAL at 00:25

## 2019-06-19 RX ADMIN — SODIUM CHLORIDE, PRESERVATIVE FREE 10 ML: 5 INJECTION INTRAVENOUS at 08:23

## 2019-06-24 ENCOUNTER — OFFICE VISIT (OUTPATIENT)
Dept: FAMILY MEDICINE CLINIC | Facility: CLINIC | Age: 64
End: 2019-06-24

## 2019-06-24 VITALS
HEIGHT: 70 IN | HEART RATE: 62 BPM | BODY MASS INDEX: 38.14 KG/M2 | DIASTOLIC BLOOD PRESSURE: 78 MMHG | WEIGHT: 266.4 LBS | TEMPERATURE: 98.4 F | OXYGEN SATURATION: 95 % | SYSTOLIC BLOOD PRESSURE: 160 MMHG

## 2019-06-24 DIAGNOSIS — E11.65 TYPE 2 DIABETES MELLITUS WITH HYPERGLYCEMIA, WITH LONG-TERM CURRENT USE OF INSULIN (HCC): ICD-10-CM

## 2019-06-24 DIAGNOSIS — I65.29 CAROTID ARTERY CALCIFICATION, UNSPECIFIED LATERALITY: Primary | ICD-10-CM

## 2019-06-24 DIAGNOSIS — M54.2 NECK PAIN: ICD-10-CM

## 2019-06-24 DIAGNOSIS — Z79.4 TYPE 2 DIABETES MELLITUS WITH HYPERGLYCEMIA, WITH LONG-TERM CURRENT USE OF INSULIN (HCC): ICD-10-CM

## 2019-06-24 PROCEDURE — 99214 OFFICE O/P EST MOD 30 MIN: CPT | Performed by: FAMILY MEDICINE

## 2019-06-24 RX ORDER — AMOXICILLIN 875 MG/1
875 TABLET, COATED ORAL 2 TIMES DAILY
Qty: 20 TABLET | Refills: 0 | Status: SHIPPED | OUTPATIENT
Start: 2019-06-24 | End: 2019-09-04

## 2019-06-24 NOTE — PROGRESS NOTES
" Subjective   Pablo Pina is a 63 y.o. male.     History of Present Illness     Hospital follow up.  Had heart stent placed dr barone.  Soft tissue ultrasound neck, probably calcifications in carotid arteries.  Throat hurts, positional.  Given carafate but has not gotten filled.  Eats honey bun and sugar goes up.  Wants another insulin plan until can see endocrinologist.  admits not compliant with diet.  Last hga1c 6.94 on 6/4/19.   He says his neck pain got better after taking a few days of penicillin a family member had left over, but he ran out.   He has dizziness and is light headed at times.    Review of Systems   Constitutional: Negative for chills, fatigue and fever.   HENT: Negative for congestion, ear discharge, ear pain, facial swelling, hearing loss, postnasal drip, rhinorrhea, sinus pressure, sore throat, trouble swallowing and voice change.    Eyes: Negative for discharge, redness and visual disturbance.   Respiratory: Negative for cough, chest tightness, shortness of breath and wheezing.    Cardiovascular: Negative for chest pain and palpitations.   Gastrointestinal: Negative for abdominal pain, blood in stool, constipation, diarrhea, nausea and vomiting.   Endocrine: Negative for polydipsia and polyuria.   Genitourinary: Negative for dysuria, flank pain, hematuria and urgency.   Musculoskeletal: Negative for arthralgias, back pain, joint swelling and myalgias.   Skin: Negative for rash.   Neurological: Negative for dizziness, weakness, numbness and headaches.   Hematological: Negative for adenopathy.   Psychiatric/Behavioral: Negative for confusion and sleep disturbance. The patient is not nervous/anxious.            /78 (BP Location: Left arm, Patient Position: Sitting, Cuff Size: Adult)   Pulse 62   Temp 98.4 °F (36.9 °C) (Temporal)   Ht 177.8 cm (70\")   Wt 121 kg (266 lb 6.4 oz)   SpO2 95%   BMI 38.22 kg/m²       Objective     Physical Exam   Constitutional: He is oriented to " person, place, and time. He appears well-developed and well-nourished.   HENT:   Head: Normocephalic and atraumatic.   Right Ear: External ear normal.   Left Ear: External ear normal.   Nose: Nose normal.   Eyes: Conjunctivae and EOM are normal. Pupils are equal, round, and reactive to light.   Neck: Normal range of motion.   Pulmonary/Chest: Effort normal.   Musculoskeletal: Normal range of motion.   Neurological: He is alert and oriented to person, place, and time.   Psychiatric: He has a normal mood and affect. His behavior is normal. Judgment and thought content normal.   Nursing note and vitals reviewed.          PAST MEDICAL HISTORY     Past Medical History:   Diagnosis Date   • Acquired hypothyroidism    • Acute bronchitis    • Arm pain     Pain in right arm     • Borderline glaucoma    • Clouded consciousness     wooziness when getting up from laying or sitting position    • Coronary arteriosclerosis    • Encounter for immunization    • Essential hypertension    • Fatigue    • Feces, abnormal     Other fecal abnormalities   • Gastroesophageal reflux disease    • Hypercholesterolemia    • Hyperlipidemia    • Hypermetropia    • Hypertensive disorder    • Impacted cerumen    • Impaired glucose tolerance    • Impotence    • Need for immunization against influenza    • Obstructive sleep apnea syndrome    • Overweight    • Patient noncompliance     with other medical treatment and regimen    • Patient noncompliance, general    • Presbyopia    • Sacroiliac joint pain    • Sensorineural hearing loss of both ears    • Skin tag    • Type 2 diabetes mellitus (CMS/HCC)       PAST SURGICAL HISTORY     Past Surgical History:   Procedure Laterality Date   • CARDIAC CATHETERIZATION N/A 10/9/2018    Procedure: Left Heart Cath;  Surgeon: Lit Rodriguez MD;  Location: [facility] INVASIVE LOCATION;  Service: Cardiology   • CARDIAC CATHETERIZATION N/A 6/18/2019    Procedure: Left Heart Cath;  Surgeon: Lit Rodriguez MD;   Location: [facility] CATH INVASIVE LOCATION;  Service: Cardiology   • CORONARY ARTERY BYPASS GRAFT  01/26/2005    CABG (1) - LIMA to LAD, SVG to D1, SVG to OM,SVG to PDA.   • INJECTION OF MEDICATION  10/01/2015    Kenalog (1) - OBI Nguyễn   • OTHER SURGICAL HISTORY      Coronary artery stent placement (1)   • OTHER SURGICAL HISTORY  06/02/2012    Remove Impacted Cerumen 71214 (1) - OLYA Hammond   • OTHER SURGICAL HISTORY  03/11/2014    Skin Tag Removal (Each Additional 10 97879 (1) - OBI Nguyễn   • OTHER SURGICAL HISTORY  10/01/2015    Nebulizer Treatment 67233 (1) - OBI Nguyễn      SOCIAL HISTORY     Social History     Socioeconomic History   • Marital status:      Spouse name: Not on file   • Number of children: Not on file   • Years of education: Not on file   • Highest education level: Not on file   Tobacco Use   • Smoking status: Former Smoker   • Smokeless tobacco: Never Used   • Tobacco comment: Quit around 2005   Substance and Sexual Activity   • Alcohol use: No   • Drug use: No   • Sexual activity: No      ALLERGIES   Patient has no known allergies.   MEDICATIONS     Current Outpatient Medications   Medication Sig Dispense Refill   • Alcohol Swabs 70 % pads 1 each 2 (Two) Times a Day. 100 each 11   • aspirin 81 MG chewable tablet Chew 81 mg Every Night.     • B-D ULTRAFINE III SHORT PEN 31G X 8 MM misc USE UTD WITH INSULIN  2   • B-D ULTRAFINE III SHORT PEN 31G X 8 MM misc USE AS DIRECTED WITH INSULIN 100 each 11   • carvedilol (COREG) 3.125 MG tablet Take 1 tablet by mouth 2 (Two) Times a Day With Meals. 60 tablet 0   • cetirizine (zyrTEC) 10 MG tablet Take 1 tablet by mouth Daily. 30 tablet 11   • fluticasone (FLONASE) 50 MCG/ACT nasal spray 2 sprays into the nostril(s) as directed by provider Daily. 1 bottle 11   • furosemide (LASIX) 20 MG tablet Take 20 mg by mouth as needed.  12   • guaiFENesin (MUCINEX) 600 MG 12 hr tablet Take 2 tablets by mouth Every 12 (Twelve) Hours. 30 tablet 0   •  guaifenesin-dextromethorphan (MUCINEX DM)  MG tablet sustained-release 12 hour tablet Take 1 tablet by mouth 2 (Two) Times a Day As Needed (congestion). 30 each 11   • insulin aspart protamine & aspart (NOVOLOG) 70/30 100 unit/mL 90 u am and 30-50u pm. 50 mL 5   • latanoprost (XALATAN) 0.005 % ophthalmic solution PLACE 1 DROP INTO EACH EYE AT BEDTIME  6   • levothyroxine (SYNTHROID, LEVOTHROID) 150 MCG tablet Take 1 tablet by mouth Daily. (Patient taking differently: Take 150 mcg by mouth Every Night.) 90 tablet 1   • lisinopril (PRINIVIL,ZESTRIL) 5 MG tablet TAKE 1 TABLET BY MOUTH EVERY DAY (Patient taking differently: TAKE 1 TABLET BY MOUTH EVERY nightly) 90 tablet 0   • nitroglycerin (NITROSTAT) 0.4 MG SL tablet DISSOLVE 1 TABLET BY MOUTH UNDER THE TONGUE AS NEEDED FOR CHEST PAIN, DO NOT EXCEED MORE THAN 3 DOSES IN 15 MINUTES 25 tablet 11   • ONE TOUCH ULTRA TEST test strip TEST BLOOD SUGAR THREE TIMES DAILY AS INSTRUCTED. 100 each 11   • ONETOUCH DELICA LANCETS FINE misc USE THREE TIMES DAILY TO TEST 100 each 3   • pravastatin (PRAVACHOL) 40 MG tablet 40 mg Every Night.     • promethazine-codeine (PHENERGAN with CODEINE) 6.25-10 MG/5ML syrup Take 5 mL by mouth Every 6 (Six) Hours As Needed for Cough. 240 mL 1   • raNITIdine (ZANTAC) 150 MG tablet Take 1 tablet by mouth 2 (Two) Times a Day. 60 tablet 11   • ticagrelor (BRILINTA) 90 MG tablet tablet Take 1 tablet by mouth 2 (Two) Times a Day. 60 tablet 0   • amoxicillin (AMOXIL) 875 MG tablet Take 1 tablet by mouth 2 (Two) Times a Day. 20 tablet 0   • sucralfate (CARAFATE) 1 g tablet Take 1 tablet by mouth 4 (Four) Times a Day. 60 tablet 0     No current facility-administered medications for this visit.         The following portions of the patient's history were reviewed and updated as appropriate: allergies, current medications, past family history, past medical history, past social history, past surgical history and problem list.        Assessment/Plan    Pablo was seen today for follow-up.    Diagnoses and all orders for this visit:    Carotid artery calcification, unspecified laterality  -     US Carotid Bilateral    Type 2 diabetes mellitus with hyperglycemia, with long-term current use of insulin (CMS/[facility])    Neck pain    Other orders  -     amoxicillin (AMOXIL) 875 MG tablet; Take 1 tablet by mouth 2 (Two) Times a Day.      If continued neck pain will send to ent, may try amoxicillin    Diabetes under good enough control, there is no insulin regimen if not compliant with diet.  Strongly encouraged diet compliance    Will check ultrasound carotids.                  No Follow-up on file.                  This document has been electronically signed by Chon Nguyễn MD on June 24, 2019 1:42 PM

## 2019-06-24 NOTE — PATIENT INSTRUCTIONS

## 2019-06-25 ENCOUNTER — DOCUMENTATION (OUTPATIENT)
Dept: CARDIAC REHAB | Facility: HOSPITAL | Age: 64
End: 2019-06-25

## 2019-07-12 ENCOUNTER — HOSPITAL ENCOUNTER (OUTPATIENT)
Dept: ULTRASOUND IMAGING | Facility: HOSPITAL | Age: 64
Discharge: HOME | End: 2019-07-12
Admitting: FAMILY MEDICINE

## 2019-07-12 PROCEDURE — 93880 EXTRACRANIAL BILAT STUDY: CPT

## 2019-07-29 RX ORDER — PEN NEEDLE, DIABETIC 31 GX5/16"
1 NEEDLE, DISPOSABLE MISCELLANEOUS 3 TIMES DAILY
Qty: 100 EACH | Refills: 11 | Status: SHIPPED | OUTPATIENT
Start: 2019-07-29 | End: 2019-10-25

## 2019-07-30 ENCOUNTER — TELEPHONE (OUTPATIENT)
Dept: FAMILY MEDICINE CLINIC | Facility: CLINIC | Age: 64
End: 2019-07-30

## 2019-07-30 RX ORDER — FLASH GLUCOSE SENSOR
KIT MISCELLANEOUS
Qty: 1 EACH | Refills: 0 | Status: SHIPPED | OUTPATIENT
Start: 2019-07-30 | End: 2019-09-01 | Stop reason: SDUPTHER

## 2019-09-03 RX ORDER — FLASH GLUCOSE SCANNING READER
EACH MISCELLANEOUS
Qty: 1 DEVICE | Refills: 0 | Status: SHIPPED | OUTPATIENT
Start: 2019-09-03 | End: 2021-03-02 | Stop reason: ALTCHOICE

## 2019-09-03 RX ORDER — FLASH GLUCOSE SENSOR
KIT MISCELLANEOUS
Qty: 2 EACH | Refills: 11 | Status: SHIPPED | OUTPATIENT
Start: 2019-09-03 | End: 2020-10-06

## 2019-09-04 ENCOUNTER — OFFICE VISIT (OUTPATIENT)
Dept: FAMILY MEDICINE CLINIC | Facility: CLINIC | Age: 64
End: 2019-09-04

## 2019-09-04 VITALS
HEART RATE: 64 BPM | HEIGHT: 70 IN | TEMPERATURE: 97.9 F | BODY MASS INDEX: 38.14 KG/M2 | DIASTOLIC BLOOD PRESSURE: 68 MMHG | WEIGHT: 266.4 LBS | SYSTOLIC BLOOD PRESSURE: 110 MMHG | OXYGEN SATURATION: 96 %

## 2019-09-04 DIAGNOSIS — L91.8 CUTANEOUS SKIN TAGS: Primary | ICD-10-CM

## 2019-09-04 PROCEDURE — 11200 RMVL SKIN TAGS UP TO&INC 15: CPT | Performed by: FAMILY MEDICINE

## 2019-09-04 RX ORDER — CARVEDILOL 3.12 MG/1
3.12 TABLET ORAL 2 TIMES DAILY WITH MEALS
Qty: 60 TABLET | Refills: 11 | Status: SHIPPED | OUTPATIENT
Start: 2019-09-04 | End: 2020-11-07

## 2019-09-04 RX ORDER — LISINOPRIL 5 MG/1
5 TABLET ORAL DAILY
Qty: 90 TABLET | Refills: 3 | Status: SHIPPED | OUTPATIENT
Start: 2019-09-04 | End: 2020-06-23 | Stop reason: DRUGHIGH

## 2019-09-04 RX ORDER — SUCRALFATE 1 G/1
1 TABLET ORAL 4 TIMES DAILY
Qty: 120 TABLET | Refills: 11 | Status: SHIPPED | OUTPATIENT
Start: 2019-09-04 | End: 2022-08-29

## 2019-09-04 NOTE — PATIENT INSTRUCTIONS

## 2019-09-04 NOTE — PROGRESS NOTES
" Subjective   Pablo Pina is a 63 y.o. male.     History of Present Illness     Wants skin tags removed left inferior groin adjacent to scrotum.  Bothers him .    Review of Systems   Constitutional: Negative for chills, fatigue and fever.   HENT: Negative for congestion, ear discharge, ear pain, facial swelling, hearing loss, postnasal drip, rhinorrhea, sinus pressure, sore throat, trouble swallowing and voice change.    Eyes: Negative for discharge, redness and visual disturbance.   Respiratory: Negative for cough, chest tightness, shortness of breath and wheezing.    Cardiovascular: Negative for chest pain and palpitations.   Gastrointestinal: Negative for abdominal pain, blood in stool, constipation, diarrhea, nausea and vomiting.   Endocrine: Negative for polydipsia and polyuria.   Genitourinary: Negative for dysuria, flank pain, hematuria and urgency.   Musculoskeletal: Negative for arthralgias, back pain, joint swelling and myalgias.   Skin: Negative for rash.   Neurological: Negative for dizziness, weakness, numbness and headaches.   Hematological: Negative for adenopathy.   Psychiatric/Behavioral: Negative for confusion and sleep disturbance. The patient is not nervous/anxious.            /68 (BP Location: Left arm, Patient Position: Sitting, Cuff Size: Adult)   Pulse 64   Temp 97.9 °F (36.6 °C) (Temporal)   Ht 177.8 cm (70\")   Wt 121 kg (266 lb 6.4 oz)   SpO2 96%   BMI 38.22 kg/m²       Objective     Physical Exam   Constitutional: He is oriented to person, place, and time. He appears well-developed and well-nourished.   HENT:   Head: Normocephalic and atraumatic.   Right Ear: External ear normal.   Left Ear: External ear normal.   Nose: Nose normal.   Eyes: Conjunctivae and EOM are normal. Pupils are equal, round, and reactive to light.   Neck: Normal range of motion.   Pulmonary/Chest: Effort normal.   Musculoskeletal: Normal range of motion.   Neurological: He is alert and oriented to " person, place, and time.   Skin:   six 2-3mm skin colored sessile papules.    Psychiatric: He has a normal mood and affect. His behavior is normal. Judgment and thought content normal.   Nursing note and vitals reviewed.          PAST MEDICAL HISTORY     Past Medical History:   Diagnosis Date   • Acquired hypothyroidism    • Acute bronchitis    • Arm pain     Pain in right arm     • Borderline glaucoma    • Clouded consciousness     wooziness when getting up from laying or sitting position    • Coronary arteriosclerosis    • Encounter for immunization    • Essential hypertension    • Fatigue    • Feces, abnormal     Other fecal abnormalities   • Gastroesophageal reflux disease    • Hypercholesterolemia    • Hyperlipidemia    • Hypermetropia    • Hypertensive disorder    • Impacted cerumen    • Impaired glucose tolerance    • Impotence    • Need for immunization against influenza    • Obstructive sleep apnea syndrome    • Overweight    • Patient noncompliance     with other medical treatment and regimen    • Patient noncompliance, general    • Presbyopia    • Sacroiliac joint pain    • Sensorineural hearing loss of both ears    • Skin tag    • Type 2 diabetes mellitus (CMS/[facility])       PAST SURGICAL HISTORY     Past Surgical History:   Procedure Laterality Date   • CARDIAC CATHETERIZATION N/A 10/9/2018    Procedure: Left Heart Cath;  Surgeon: Lit Rodriguez MD;  Location: [facility] CATH INVASIVE LOCATION;  Service: Cardiology   • CARDIAC CATHETERIZATION N/A 6/18/2019    Procedure: Left Heart Cath;  Surgeon: Lit Rodriguez MD;  Location: [facility] CATH INVASIVE LOCATION;  Service: Cardiology   • CORONARY ARTERY BYPASS GRAFT  01/26/2005    CABG (1) - LIMA to LAD, SVG to D1, SVG to OM,SVG to PDA.   • INJECTION OF MEDICATION  10/01/2015    Kenalog (1) - OBI Nguyễn   • OTHER SURGICAL HISTORY      Coronary artery stent placement (1)   • OTHER SURGICAL HISTORY  06/02/2012    Remove Impacted Cerumen 94706 (1) - OLYA Hammond   • OTHER  SURGICAL HISTORY  03/11/2014    Skin Tag Removal (Each Additional 10 94107 (1) - OBI Nguyễn   • OTHER SURGICAL HISTORY  10/01/2015    Nebulizer Treatment 55251 (1) - OBI Nguyễn      SOCIAL HISTORY     Social History     Socioeconomic History   • Marital status:      Spouse name: Not on file   • Number of children: Not on file   • Years of education: Not on file   • Highest education level: Not on file   Tobacco Use   • Smoking status: Former Smoker   • Smokeless tobacco: Never Used   • Tobacco comment: Quit around 2005   Substance and Sexual Activity   • Alcohol use: No   • Drug use: No   • Sexual activity: No      ALLERGIES   Patient has no known allergies.   MEDICATIONS     Current Outpatient Medications   Medication Sig Dispense Refill   • Alcohol Swabs 70 % pads 1 each 2 (Two) Times a Day. 100 each 11   • aspirin 81 MG chewable tablet Chew 81 mg Every Night.     • B-D ULTRAFINE III SHORT PEN 31G X 8 MM misc USE AS DIRECTED WITH INSULIN 100 each 11   • B-D ULTRAFINE III SHORT PEN 31G X 8 MM misc Inject 1 each under the skin into the appropriate area as directed 3 (Three) Times a Day. 100 each 11   • carvedilol (COREG) 3.125 MG tablet Take 1 tablet by mouth 2 (Two) Times a Day With Meals. 60 tablet 0   • cetirizine (zyrTEC) 10 MG tablet Take 1 tablet by mouth Daily. 30 tablet 11   • Continuous Blood Gluc  (FREESTYLE RAJESH 14 DAY READER) device USE AS DIRECTED TO MONITOR BLOOD SUGAR 1 Device 0   • Continuous Blood Gluc Sensor (FREESTYLE RAJESH 14 DAY SENSOR) misc TEST ONCE DAILY AND CHANGE SENSOR EVERY 14 DAYS. 2 each 11   • fluticasone (FLONASE) 50 MCG/ACT nasal spray 2 sprays into the nostril(s) as directed by provider Daily. 1 bottle 11   • furosemide (LASIX) 20 MG tablet Take 20 mg by mouth as needed.  12   • guaifenesin-dextromethorphan (MUCINEX DM)  MG tablet sustained-release 12 hour tablet Take 1 tablet by mouth 2 (Two) Times a Day As Needed (congestion). 30 each 11   • insulin aspart  protamine & aspart (NOVOLOG) 70/30 100 unit/mL INJECT INTO THE SKIN 90 UNITS IN THE MORNING AND 30-50 UNITS AS NEEDED 48 mL 1   • latanoprost (XALATAN) 0.005 % ophthalmic solution PLACE 1 DROP INTO EACH EYE AT BEDTIME  6   • levothyroxine (SYNTHROID, LEVOTHROID) 150 MCG tablet Take 1 tablet by mouth Daily. (Patient taking differently: Take 150 mcg by mouth Every Night.) 90 tablet 1   • lisinopril (PRINIVIL,ZESTRIL) 5 MG tablet TAKE 1 TABLET BY MOUTH EVERY DAY (Patient taking differently: TAKE 1 TABLET BY MOUTH EVERY nightly) 90 tablet 0   • nitroglycerin (NITROSTAT) 0.4 MG SL tablet DISSOLVE 1 TABLET BY MOUTH UNDER THE TONGUE AS NEEDED FOR CHEST PAIN, DO NOT EXCEED MORE THAN 3 DOSES IN 15 MINUTES 25 tablet 11   • ONE TOUCH ULTRA TEST test strip TEST BLOOD SUGAR THREE TIMES DAILY AS INSTRUCTED. 100 each 11   • ONETOUCH DELICA LANCETS FINE misc USE THREE TIMES DAILY TO TEST 100 each 3   • pravastatin (PRAVACHOL) 40 MG tablet 40 mg Every Night.     • raNITIdine (ZANTAC) 150 MG tablet Take 1 tablet by mouth 2 (Two) Times a Day. 60 tablet 11   • ticagrelor (BRILINTA) 90 MG tablet tablet Take 1 tablet by mouth 2 (Two) Times a Day. 60 tablet 0   • promethazine-codeine (PHENERGAN with CODEINE) 6.25-10 MG/5ML syrup Take 5 mL by mouth Every 6 (Six) Hours As Needed for Cough. 240 mL 1   • sucralfate (CARAFATE) 1 g tablet Take 1 tablet by mouth 4 (Four) Times a Day. 60 tablet 0     No current facility-administered medications for this visit.         The following portions of the patient's history were reviewed and updated as appropriate: allergies, current medications, past family history, past medical history, past social history, past surgical history and problem list.        Assessment/Plan   Pablo was seen today for skin lesion.    Diagnoses and all orders for this visit:    Cutaneous skin tags    Other orders  -     ticagrelor (BRILINTA) 90 MG tablet tablet; Take 1 tablet by mouth 2 (Two) Times a Day.  -     lisinopril  (PRINIVIL,ZESTRIL) 5 MG tablet; Take 1 tablet by mouth Daily.  -     carvedilol (COREG) 3.125 MG tablet; Take 1 tablet by mouth 2 (Two) Times a Day With Meals.  -     sucralfate (CARAFATE) 1 g tablet; Take 1 tablet by mouth 4 (Four) Times a Day.      PROCEDURE NOTE:  CONSENT SIGNED.  AREA PREPPED BETADINE ALLOWED TO DRY.  LOCAL ANESTHESIA 1% LIDOCAINE. EACH SKIN TAG EXCISED WITH STERILE CURVED IRIS SCISSORS.  SILVER NITRATE STICKS AND 20% ALLUMINUM CHLORIDE SOLUTION USED FOR HEMOSTATSIS.  DRESSING APPLIED.   CALL IF ANY SIGNS INFECITON.                 No Follow-up on file.                  This document has been electronically signed by Chon Nguyễn MD on September 4, 2019 1:14 PM

## 2019-10-21 ENCOUNTER — TELEPHONE (OUTPATIENT)
Dept: FAMILY MEDICINE CLINIC | Facility: CLINIC | Age: 64
End: 2019-10-21

## 2019-10-21 RX ORDER — FAMOTIDINE 40 MG/1
40 TABLET, FILM COATED ORAL DAILY
Qty: 90 TABLET | Refills: 3 | Status: SHIPPED | OUTPATIENT
Start: 2019-10-21 | End: 2020-03-09 | Stop reason: RX

## 2019-10-21 NOTE — TELEPHONE ENCOUNTER
Wants to get off Ranitidine/ can you change to something else that would work/ Kamila sprague main/ marco take omperazole due to heart meds

## 2019-10-25 ENCOUNTER — OFFICE VISIT (OUTPATIENT)
Dept: ENDOCRINOLOGY | Facility: CLINIC | Age: 64
End: 2019-10-25

## 2019-10-25 VITALS
SYSTOLIC BLOOD PRESSURE: 120 MMHG | WEIGHT: 264 LBS | OXYGEN SATURATION: 98 % | BODY MASS INDEX: 37.8 KG/M2 | HEIGHT: 70 IN | DIASTOLIC BLOOD PRESSURE: 74 MMHG | HEART RATE: 64 BPM

## 2019-10-25 DIAGNOSIS — I15.2 HYPERTENSION ASSOCIATED WITH DIABETES (HCC): ICD-10-CM

## 2019-10-25 DIAGNOSIS — E11.69 MIXED DIABETIC HYPERLIPIDEMIA ASSOCIATED WITH TYPE 2 DIABETES MELLITUS (HCC): ICD-10-CM

## 2019-10-25 DIAGNOSIS — E55.9 VITAMIN D DEFICIENCY: ICD-10-CM

## 2019-10-25 DIAGNOSIS — E11.59 HYPERTENSION ASSOCIATED WITH DIABETES (HCC): ICD-10-CM

## 2019-10-25 DIAGNOSIS — E11.8 TYPE 2 DIABETES MELLITUS WITH COMPLICATION (HCC): Primary | ICD-10-CM

## 2019-10-25 DIAGNOSIS — E78.2 MIXED DIABETIC HYPERLIPIDEMIA ASSOCIATED WITH TYPE 2 DIABETES MELLITUS (HCC): ICD-10-CM

## 2019-10-25 LAB
GLUCOSE BLDC GLUCOMTR-MCNC: 148 MG/DL (ref 70–130)
HBA1C MFR BLD: 6.8 %

## 2019-10-25 PROCEDURE — 83036 HEMOGLOBIN GLYCOSYLATED A1C: CPT | Performed by: INTERNAL MEDICINE

## 2019-10-25 PROCEDURE — 82962 GLUCOSE BLOOD TEST: CPT | Performed by: INTERNAL MEDICINE

## 2019-10-25 PROCEDURE — 99204 OFFICE O/P NEW MOD 45 MIN: CPT | Performed by: INTERNAL MEDICINE

## 2019-10-25 RX ORDER — ATORVASTATIN CALCIUM 40 MG/1
40 TABLET, FILM COATED ORAL DAILY
Qty: 30 TABLET | Refills: 11 | Status: SHIPPED | OUTPATIENT
Start: 2019-10-25 | End: 2020-10-24

## 2019-10-25 NOTE — PROGRESS NOTES
"Pablo Pina is a 63 y.o. male who presents for  evaluation of   Chief Complaint   Patient presents with   • Diabetes       Referring provider       Primary Care Provider    Chon Nguyễn MD    Duration 10 years    Timing - Diabetes is Constant    Quality -  needs improvement    Severity -  severe    Complications - peripheral neuropathy    Current symptoms/problems  increase appetite, polydipsia and polyuria     Alleviating Factors: Compliance       Side Effects  hypoglycemia    Current diet  in general, a \"healthy\" diet      Current exercise none    Current monitoring regimen: home blood tests - checking 4 x daily   Home blood sugar records:     Hypoglycemia unawareness and nocturnal    Past Medical History:   Diagnosis Date   • Acquired hypothyroidism    • Acute bronchitis    • Arm pain     Pain in right arm     • Borderline glaucoma    • Clouded consciousness     wooziness when getting up from laying or sitting position    • Coronary arteriosclerosis    • Encounter for immunization    • Essential hypertension    • Fatigue    • Feces, abnormal     Other fecal abnormalities   • Gastroesophageal reflux disease    • Hypercholesterolemia    • Hyperlipidemia    • Hypermetropia    • Hypertensive disorder    • Impacted cerumen    • Impaired glucose tolerance    • Impotence    • Need for immunization against influenza    • Obstructive sleep apnea syndrome    • Overweight    • Patient noncompliance     with other medical treatment and regimen    • Patient noncompliance, general    • Presbyopia    • Sacroiliac joint pain    • Sensorineural hearing loss of both ears    • Skin tag    • Type 2 diabetes mellitus (CMS/HCC)      Family History   Problem Relation Age of Onset   • Heart attack Brother 46     Social History     Tobacco Use   • Smoking status: Former Smoker   • Smokeless tobacco: Never Used   • Tobacco comment: Quit around 2005   Substance Use Topics   • Alcohol use: No   • Drug use: No "         Current Outpatient Medications:   •  Alcohol Swabs 70 % pads, 1 each 2 (Two) Times a Day., Disp: 100 each, Rfl: 11  •  aspirin 81 MG chewable tablet, Chew 81 mg Every Night., Disp: , Rfl:   •  carvedilol (COREG) 3.125 MG tablet, Take 1 tablet by mouth 2 (Two) Times a Day With Meals., Disp: 60 tablet, Rfl: 11  •  cetirizine (zyrTEC) 10 MG tablet, Take 1 tablet by mouth Daily., Disp: 30 tablet, Rfl: 11  •  famotidine (PEPCID) 40 MG tablet, Take 1 tablet by mouth Daily., Disp: 90 tablet, Rfl: 3  •  fluticasone (FLONASE) 50 MCG/ACT nasal spray, 2 sprays into the nostril(s) as directed by provider Daily., Disp: 1 bottle, Rfl: 11  •  furosemide (LASIX) 20 MG tablet, Take 20 mg by mouth as needed., Disp: , Rfl: 12  •  guaifenesin-dextromethorphan (MUCINEX DM)  MG tablet sustained-release 12 hour tablet, Take 1 tablet by mouth 2 (Two) Times a Day As Needed (congestion)., Disp: 30 each, Rfl: 11  •  latanoprost (XALATAN) 0.005 % ophthalmic solution, PLACE 1 DROP INTO EACH EYE AT BEDTIME, Disp: , Rfl: 6  •  levothyroxine (SYNTHROID, LEVOTHROID) 150 MCG tablet, Take 1 tablet by mouth Daily. (Patient taking differently: Take 150 mcg by mouth Every Night.), Disp: 90 tablet, Rfl: 1  •  lisinopril (PRINIVIL,ZESTRIL) 5 MG tablet, Take 1 tablet by mouth Daily., Disp: 90 tablet, Rfl: 3  •  nitroglycerin (NITROSTAT) 0.4 MG SL tablet, DISSOLVE 1 TABLET BY MOUTH UNDER THE TONGUE AS NEEDED FOR CHEST PAIN, DO NOT EXCEED MORE THAN 3 DOSES IN 15 MINUTES, Disp: 25 tablet, Rfl: 11  •  ONE TOUCH ULTRA TEST test strip, TEST BLOOD SUGAR THREE TIMES DAILY AS INSTRUCTED., Disp: 100 each, Rfl: 11  •  ONETOUCH DELICA LANCETS FINE [facility], USE THREE TIMES DAILY TO TEST, Disp: 100 each, Rfl: 3  •  ticagrelor (BRILINTA) 90 MG tablet tablet, Take 1 tablet by mouth 2 (Two) Times a Day., Disp: 60 tablet, Rfl: 11  •  atorvastatin (LIPITOR) 40 MG tablet, Take 1 tablet by mouth Daily., Disp: 30 tablet, Rfl: 11  •  Continuous Blood Gluc   (FREESTYLE RAJESH 14 DAY READER) device, USE AS DIRECTED TO MONITOR BLOOD SUGAR, Disp: 1 Device, Rfl: 0  •  Continuous Blood Gluc Sensor (FREESTYLE RAJESH 14 DAY SENSOR) misc, TEST ONCE DAILY AND CHANGE SENSOR EVERY 14 DAYS., Disp: 2 each, Rfl: 11  •  promethazine-codeine (PHENERGAN with CODEINE) 6.25-10 MG/5ML syrup, Take 5 mL by mouth Every 6 (Six) Hours As Needed for Cough., Disp: 240 mL, Rfl: 1  •  sucralfate (CARAFATE) 1 g tablet, Take 1 tablet by mouth 4 (Four) Times a Day., Disp: 120 tablet, Rfl: 11    Review of Systems    Review of Systems   Constitutional: Positive for fatigue and unexpected weight change. Negative for activity change, appetite change, chills, diaphoresis and fever.   HENT: Negative for congestion, dental problem, drooling, ear discharge, ear pain, facial swelling, mouth sores, postnasal drip, rhinorrhea, sinus pressure, sore throat, tinnitus, trouble swallowing and voice change.    Eyes: Negative for photophobia, pain, discharge, redness, itching and visual disturbance.   Respiratory: Negative for apnea, cough, choking, chest tightness, shortness of breath, wheezing and stridor.    Cardiovascular: Negative for chest pain, palpitations and leg swelling.   Gastrointestinal: Negative for abdominal distention, abdominal pain, constipation, diarrhea, nausea and vomiting.   Endocrine: Positive for polydipsia, polyphagia and polyuria. Negative for cold intolerance and heat intolerance.   Genitourinary: Negative for decreased urine volume, difficulty urinating, dysuria, flank pain, frequency, hematuria and urgency.   Musculoskeletal: Negative for arthralgias, back pain, gait problem, joint swelling, myalgias, neck pain and neck stiffness.   Skin: Negative for color change, pallor, rash and wound.   Allergic/Immunologic: Negative for immunocompromised state.   Neurological: Positive for weakness. Negative for dizziness, tremors, seizures, syncope, facial asymmetry, speech difficulty,  "light-headedness, numbness and headaches.   Hematological: Negative for adenopathy.   Psychiatric/Behavioral: Negative for agitation, behavioral problems, confusion, decreased concentration, dysphoric mood, hallucinations, self-injury, sleep disturbance and suicidal ideas. The patient is not nervous/anxious and is not hyperactive.         Objective:   /74   Pulse 64   Ht 177.8 cm (70\")   Wt 120 kg (264 lb)   SpO2 98%   BMI 37.88 kg/m²     Physical Exam   Constitutional: He is oriented to person, place, and time. He appears well-developed and well-nourished. He is cooperative.   HENT:   Head: Normocephalic and atraumatic.   Right Ear: External ear normal.   Left Ear: External ear normal.   Nose: Nose normal.   Mouth/Throat: Oropharynx is clear and moist. No oropharyngeal exudate.   Eyes: Conjunctivae and EOM are normal. Pupils are equal, round, and reactive to light. No scleral icterus. Right eye exhibits normal extraocular motion. Left eye exhibits normal extraocular motion.   Neck: Neck supple. No JVD present. No muscular tenderness present. No tracheal deviation, no edema and no erythema present. No thyromegaly present.   Cardiovascular: Normal rate, regular rhythm, normal heart sounds and intact distal pulses. Exam reveals no gallop and no friction rub.   No murmur heard.  Pulmonary/Chest: Effort normal and breath sounds normal. No stridor. No respiratory distress. He has no decreased breath sounds. He has no wheezes. He has no rhonchi. He has no rales. He exhibits no tenderness.   Abdominal: Soft. Bowel sounds are normal. He exhibits no distension and no mass. There is no hepatomegaly. There is no tenderness. There is no rebound and no guarding. No hernia.   Musculoskeletal: Normal range of motion. He exhibits no edema, tenderness or deformity.   Lymphadenopathy:     He has no cervical adenopathy.   Neurological: He is alert and oriented to person, place, and time. He has normal reflexes. No cranial " nerve deficit. He exhibits normal muscle tone. Coordination normal.   Skin: Skin is warm. No rash noted. No erythema. No pallor.   Psychiatric: He has a normal mood and affect. His behavior is normal. Judgment and thought content normal.   Nursing note and vitals reviewed.      Lab Review    Results for orders placed or performed in visit on 10/25/19   POC Glycosylated Hemoglobin (Hb A1C)   Result Value Ref Range    Hemoglobin A1C 6.8 %   POC Glucose   Result Value Ref Range    Glucose 148 (A) 70 - 130 mg/dL         Assessment/Plan       ICD-10-CM ICD-9-CM   1. Type 2 diabetes mellitus with complication (CMS/[facility]) E11.8 250.90   2. Hypertension associated with diabetes (CMS/[facility]) E11.59 250.80    I10 401.9   3. Mixed diabetic hyperlipidemia associated with type 2 diabetes mellitus (CMS/[facility]) E11.69 250.80    E78.2 272.2         I reviewed and summarized records from Chon Nguyễn MD from current year  and I reviewed / ordered labs.   From review of records :    Pt has type 2 diabetes with hyperglycemia     Glycemic Management:   Lab Results   Component Value Date    HGBA1C 6.8 10/25/2019    HGBA1C 6.94 (H) 06/04/2019    HGBA1C 6.9 (H) 10/17/2018     Lab Results   Component Value Date    GLUCOSE 135 (H) 06/19/2019    BUN 12 06/19/2019    CREATININE 0.82 06/19/2019    EGFRIFNONA 95 06/19/2019    BCR 14.6 06/19/2019    K 4.5 06/19/2019    CO2 24.0 06/19/2019    CALCIUM 8.9 06/19/2019    ALBUMIN 4.30 06/18/2019    AST 15 06/18/2019    ALT 17 06/18/2019    ANIONGAP 12.0 06/19/2019     Lab Results   Component Value Date    WBC 9.92 06/19/2019    HGB 14.2 06/19/2019    HCT 42.7 06/19/2019    MCV 82.4 06/19/2019     06/19/2019       Stop mixed insulin     Start ozempic 0.25 mg weekly for 4 weeks then 0.5 mg weekly     Start Tresiba 40 units at night    Start humalog 15 units with meals     Diarrhea on metformin     Try to eat 60 to 75 grams of carbohydrate per meal       Discuss pump next appt    Approve for  dariel    Approve for Insulin Pump and or Continuous Glucose Sensor     #1  Patient has diabetes mellitus, insulin-dependent.    #2 He performs blood glucose testing at least times daily and blood glucose log was brought to office with variability from .    #3  He is requiring  Basal insulin  and Prandial Insulin for a total of at least  4 injections per day and has been doing this for at least 6 months.     #4 Patient tests blood sugars at least 4 times daily and makes frequent self-adjustments and patient is injecting insulin at least 4 times daily. He has been doing this for more than 6 months . He tests frequently due to hypoglycemia and hyperglycemia.     #5 I have personally seen patient within the past 6 months    #6 We plan on seeing her every 2-3 months for continuous adjustment of her diabetes regimen     #7 patient has hypoglycemia with episodes of unawareness.    #8 patient has day-to-day variation in her mealtime which confounds the degree of insulin dosing with multiple daily injections.    #9 patient has completed diabetes education program with us.    #10 He has demonstrated the ability to self monitor her glucose.        #11 Patient is motivated in improving  diabetes control         Lipid Management  Lab Results   Component Value Date    CHOL 178 10/17/2018    CHOL 259 (H) 10/09/2018    CHOL 271 (H) 11/13/2017     Lab Results   Component Value Date    TRIG 159 10/17/2018    TRIG 686 (H) 10/09/2018    TRIG 259 (H) 11/13/2017     Lab Results   Component Value Date    HDL 33 (L) 10/17/2018    HDL 28 (L) 10/09/2018    HDL 39 (L) 11/13/2017     No components found for: LDLCALC  Lab Results   Component Value Date     (H) 10/17/2018     (H) 10/09/2018     (H) 11/13/2017     No results found for: LDLDIRECT    Pravastatin , has CAD -- change to lipitor 40 mg qhs     Blood Pressure Management:    Vitals:    10/25/19 1145   BP: 120/74   Pulse: 64   SpO2: 98%     Lab Results    Component Value Date    GLUCOSE 135 (H) 06/19/2019    CALCIUM 8.9 06/19/2019     (L) 06/19/2019    K 4.5 06/19/2019    CO2 24.0 06/19/2019    CL 98 06/19/2019    BUN 12 06/19/2019    CREATININE 0.82 06/19/2019    EGFRIFNONA 95 06/19/2019    BCR 14.6 06/19/2019    ANIONGAP 12.0 06/19/2019       Controlled         Microvascular Complication Monitoring:      Eye Exam Evaluation  Pending to discuss   -----------    Last Microalbumin-Proteinuria Assessment    No results found for: MALBCRERATIO    No results found for: UTPCR    -----------      Neuropathy  yes     Weight Related:   Wt Readings from Last 3 Encounters:   10/25/19 120 kg (264 lb)   09/04/19 121 kg (266 lb 6.4 oz)   06/24/19 121 kg (266 lb 6.4 oz)     Body mass index is 37.88 kg/m².        Diet interventions: moderate (500 kCal/d) deficit diet.      Bone Health    Lab Results   Component Value Date    CALCIUM 8.9 06/19/2019       Thyroid Health    Lab Results   Component Value Date    TSH 4.260 (H) 05/29/2019    TSH 6.430 (H) 04/02/2019    TSH 14.400 (H) 01/28/2019     Recheck     On 150 daily     Other Diabetes Related Aspects       Lab Results   Component Value Date    JAWHIAKR10 332 09/23/2014           Last celiac panel     No results found for: GDPABIGA, TTRANSGLIGA, IGA, [id number]      Orders Placed This Encounter   Procedures   • POC Glycosylated Hemoglobin (Hb A1C)   • POC Glucose         A copy of my note was sent to Chon Nguyễn MD    Please see my above opinion and suggestions.

## 2019-10-25 NOTE — PATIENT INSTRUCTIONS
Stop mixed insulin     Start ozempic 0.25 mg weekly for 4 weeks then 0.5 mg weekly     Start Tresiba 40 units at night    Start humalog 15 units with meals     Diarrhea on metformin     Try to eat 60 to 75 grams of carbohydrate per meal

## 2019-10-25 NOTE — PROGRESS NOTES
It looks like he has commercial? If so, just send script for dariel to one of the 5 major pharmacies!     [Please approve for dariel]

## 2019-10-28 ENCOUNTER — TELEPHONE (OUTPATIENT)
Dept: ENDOCRINOLOGY | Facility: CLINIC | Age: 64
End: 2019-10-28

## 2019-10-28 DIAGNOSIS — E11.8 TYPE 2 DIABETES MELLITUS WITH COMPLICATION (HCC): Primary | ICD-10-CM

## 2019-11-22 ENCOUNTER — OFFICE VISIT (OUTPATIENT)
Dept: ENDOCRINOLOGY | Facility: CLINIC | Age: 64
End: 2019-11-22

## 2019-11-22 ENCOUNTER — APPOINTMENT (OUTPATIENT)
Dept: LAB | Facility: HOSPITAL | Age: 64
End: 2019-11-22

## 2019-11-22 VITALS
SYSTOLIC BLOOD PRESSURE: 128 MMHG | HEART RATE: 68 BPM | WEIGHT: 263 LBS | OXYGEN SATURATION: 98 % | BODY MASS INDEX: 37.65 KG/M2 | DIASTOLIC BLOOD PRESSURE: 80 MMHG | HEIGHT: 70 IN

## 2019-11-22 DIAGNOSIS — E03.9 ACQUIRED HYPOTHYROIDISM: ICD-10-CM

## 2019-11-22 DIAGNOSIS — E11.8 TYPE 2 DIABETES MELLITUS WITH COMPLICATION (HCC): Primary | ICD-10-CM

## 2019-11-22 LAB
25(OH)D3 SERPL-MCNC: 23.3 NG/ML (ref 30–100)
ALBUMIN SERPL-MCNC: 4.6 G/DL (ref 3.5–5.2)
ALBUMIN/GLOB SERPL: 1.4 G/DL
ALP SERPL-CCNC: 61 U/L (ref 39–117)
ALT SERPL W P-5'-P-CCNC: 19 U/L (ref 1–41)
ANION GAP SERPL CALCULATED.3IONS-SCNC: 15.8 MMOL/L (ref 5–15)
AST SERPL-CCNC: 12 U/L (ref 1–40)
BASOPHILS # BLD AUTO: 0.08 10*3/MM3 (ref 0–0.2)
BASOPHILS NFR BLD AUTO: 1 % (ref 0–1.5)
BILIRUB SERPL-MCNC: 0.5 MG/DL (ref 0.2–1.2)
BUN BLD-MCNC: 10 MG/DL (ref 8–23)
BUN/CREAT SERPL: 11 (ref 7–25)
CALCIUM SPEC-SCNC: 10.1 MG/DL (ref 8.6–10.5)
CHLORIDE SERPL-SCNC: 99 MMOL/L (ref 98–107)
CHOLEST SERPL-MCNC: 132 MG/DL (ref 0–200)
CO2 SERPL-SCNC: 28.2 MMOL/L (ref 22–29)
CREAT BLD-MCNC: 0.91 MG/DL (ref 0.76–1.27)
DEPRECATED RDW RBC AUTO: 40.1 FL (ref 37–54)
EOSINOPHIL # BLD AUTO: 0.11 10*3/MM3 (ref 0–0.4)
EOSINOPHIL NFR BLD AUTO: 1.3 % (ref 0.3–6.2)
ERYTHROCYTE [DISTWIDTH] IN BLOOD BY AUTOMATED COUNT: 13.4 % (ref 12.3–15.4)
GFR SERPL CREATININE-BSD FRML MDRD: 84 ML/MIN/1.73
GLOBULIN UR ELPH-MCNC: 3.3 GM/DL
GLUCOSE BLD-MCNC: 123 MG/DL (ref 65–99)
GLUCOSE BLDC GLUCOMTR-MCNC: 120 MG/DL (ref 70–130)
HCT VFR BLD AUTO: 47.1 % (ref 37.5–51)
HDLC SERPL-MCNC: 36 MG/DL (ref 40–60)
HGB BLD-MCNC: 15.5 G/DL (ref 13–17.7)
IMM GRANULOCYTES # BLD AUTO: 0.03 10*3/MM3 (ref 0–0.05)
IMM GRANULOCYTES NFR BLD AUTO: 0.4 % (ref 0–0.5)
LDLC SERPL CALC-MCNC: 58 MG/DL (ref 0–100)
LDLC/HDLC SERPL: 1.61 {RATIO}
LYMPHOCYTES # BLD AUTO: 1.93 10*3/MM3 (ref 0.7–3.1)
LYMPHOCYTES NFR BLD AUTO: 23.2 % (ref 19.6–45.3)
MCH RBC QN AUTO: 27.2 PG (ref 26.6–33)
MCHC RBC AUTO-ENTMCNC: 32.9 G/DL (ref 31.5–35.7)
MCV RBC AUTO: 82.8 FL (ref 79–97)
MONOCYTES # BLD AUTO: 0.68 10*3/MM3 (ref 0.1–0.9)
MONOCYTES NFR BLD AUTO: 8.2 % (ref 5–12)
NEUTROPHILS # BLD AUTO: 5.48 10*3/MM3 (ref 1.7–7)
NEUTROPHILS NFR BLD AUTO: 65.9 % (ref 42.7–76)
NRBC BLD AUTO-RTO: 0 /100 WBC (ref 0–0.2)
PLATELET # BLD AUTO: 236 10*3/MM3 (ref 140–450)
PMV BLD AUTO: 11.4 FL (ref 6–12)
POTASSIUM BLD-SCNC: 4.4 MMOL/L (ref 3.5–5.2)
PROT SERPL-MCNC: 7.9 G/DL (ref 6–8.5)
RBC # BLD AUTO: 5.69 10*6/MM3 (ref 4.14–5.8)
SODIUM BLD-SCNC: 143 MMOL/L (ref 136–145)
TRIGL SERPL-MCNC: 191 MG/DL (ref 0–150)
TSH SERPL DL<=0.05 MIU/L-ACNC: 0.35 UIU/ML (ref 0.27–4.2)
VLDLC SERPL-MCNC: 38.2 MG/DL (ref 5–40)
WBC NRBC COR # BLD: 8.31 10*3/MM3 (ref 3.4–10.8)

## 2019-11-22 PROCEDURE — 36415 COLL VENOUS BLD VENIPUNCTURE: CPT | Performed by: INTERNAL MEDICINE

## 2019-11-22 PROCEDURE — 84443 ASSAY THYROID STIM HORMONE: CPT | Performed by: INTERNAL MEDICINE

## 2019-11-22 PROCEDURE — 82962 GLUCOSE BLOOD TEST: CPT | Performed by: NURSE PRACTITIONER

## 2019-11-22 PROCEDURE — 99214 OFFICE O/P EST MOD 30 MIN: CPT | Performed by: NURSE PRACTITIONER

## 2019-11-22 PROCEDURE — 85025 COMPLETE CBC W/AUTO DIFF WBC: CPT | Performed by: INTERNAL MEDICINE

## 2019-11-22 PROCEDURE — 80053 COMPREHEN METABOLIC PANEL: CPT | Performed by: INTERNAL MEDICINE

## 2019-11-22 PROCEDURE — 80061 LIPID PANEL: CPT | Performed by: INTERNAL MEDICINE

## 2019-11-22 PROCEDURE — 82306 VITAMIN D 25 HYDROXY: CPT | Performed by: INTERNAL MEDICINE

## 2019-11-22 NOTE — PROGRESS NOTES
"Pablo Pina is a 63 y.o. male who presents for follow up       Chief Complaint   Patient presents with   • Diabetes       Referring provider       Primary Care Provider    Chon Nguyễn MD    Duration 10 years    Timing - Diabetes is Constant    Quality -  Improving     Severity -  severe    Complications - peripheral neuropathy    Current symptoms/problems  increase appetite, polydipsia and polyuria     Alleviating Factors: Compliance       Side Effects  hypoglycemia    Current diet  in general, a \"healthy\" diet      Current exercise none    Current monitoring regimen: home blood tests - checking 4 x daily     Home blood sugar records:     Hypoglycemia unawareness and nocturnal , none since last visit       Blood sugar ranges have been 100-180    Has been self adjusting Tresiba    Past Medical History:   Diagnosis Date   • Acquired hypothyroidism    • Acute bronchitis    • Arm pain     Pain in right arm     • Borderline glaucoma    • Clouded consciousness     wooziness when getting up from laying or sitting position    • Coronary arteriosclerosis    • Encounter for immunization    • Essential hypertension    • Fatigue    • Feces, abnormal     Other fecal abnormalities   • Gastroesophageal reflux disease    • Hypercholesterolemia    • Hyperlipidemia    • Hypermetropia    • Hypertensive disorder    • Impacted cerumen    • Impaired glucose tolerance    • Impotence    • Need for immunization against influenza    • Obstructive sleep apnea syndrome    • Overweight    • Patient noncompliance     with other medical treatment and regimen    • Patient noncompliance, general    • Presbyopia    • Sacroiliac joint pain    • Sensorineural hearing loss of both ears    • Skin tag    • Type 2 diabetes mellitus (CMS/HCC)      Family History   Problem Relation Age of Onset   • Heart attack Brother 46     Social History     Tobacco Use   • Smoking status: Former Smoker   • Smokeless tobacco: Never Used   • Tobacco " comment: Quit around 2005   Substance Use Topics   • Alcohol use: No   • Drug use: No         Current Outpatient Medications:   •  Alcohol Swabs 70 % pads, 1 each 2 (Two) Times a Day., Disp: 100 each, Rfl: 11  •  aspirin 81 MG chewable tablet, Chew 81 mg Every Night., Disp: , Rfl:   •  atorvastatin (LIPITOR) 40 MG tablet, Take 1 tablet by mouth Daily., Disp: 30 tablet, Rfl: 11  •  carvedilol (COREG) 3.125 MG tablet, Take 1 tablet by mouth 2 (Two) Times a Day With Meals., Disp: 60 tablet, Rfl: 11  •  cetirizine (zyrTEC) 10 MG tablet, Take 1 tablet by mouth Daily., Disp: 30 tablet, Rfl: 11  •  Continuous Blood Gluc  (FREESTYLE RAJESH 14 DAY READER) device, USE AS DIRECTED TO MONITOR BLOOD SUGAR, Disp: 1 Device, Rfl: 0  •  Continuous Blood Gluc Sensor (FREESTYLE RAJESH 14 DAY SENSOR) mis, TEST ONCE DAILY AND CHANGE SENSOR EVERY 14 DAYS., Disp: 2 each, Rfl: 11  •  famotidine (PEPCID) 40 MG tablet, Take 1 tablet by mouth Daily., Disp: 90 tablet, Rfl: 3  •  fluticasone (FLONASE) 50 MCG/ACT nasal spray, 2 sprays into the nostril(s) as directed by provider Daily., Disp: 1 bottle, Rfl: 11  •  furosemide (LASIX) 20 MG tablet, Take 20 mg by mouth as needed., Disp: , Rfl: 12  •  Insulin Glargine (LANTUS SOLOSTAR) 100 UNIT/ML injection pen, Inject 40 Units under the skin into the appropriate area as directed Every Night., Disp: 4 pen, Rfl: 11  •  Insulin Lispro (HUMALOG KWIKPEN) 200 UNIT/ML solution pen-injector, Inject 15 Units under the skin into the appropriate area as directed 3 (Three) Times a Day With Meals., Disp: 3 pen, Rfl: 11  •  Insulin Pen Needle (B-D UF III MINI PEN NEEDLES) 31G X 5 MM misc, Use 4 times daily  , ICD10 code is E11.9, Disp: 120 each, Rfl: 11  •  latanoprost (XALATAN) 0.005 % ophthalmic solution, PLACE 1 DROP INTO EACH EYE AT BEDTIME, Disp: , Rfl: 6  •  levothyroxine (SYNTHROID, LEVOTHROID) 150 MCG tablet, Take 1 tablet by mouth Daily. (Patient taking differently: Take 150 mcg by mouth Every  Night.), Disp: 90 tablet, Rfl: 1  •  lisinopril (PRINIVIL,ZESTRIL) 5 MG tablet, Take 1 tablet by mouth Daily., Disp: 90 tablet, Rfl: 3  •  nitroglycerin (NITROSTAT) 0.4 MG SL tablet, DISSOLVE 1 TABLET BY MOUTH UNDER THE TONGUE AS NEEDED FOR CHEST PAIN, DO NOT EXCEED MORE THAN 3 DOSES IN 15 MINUTES, Disp: 25 tablet, Rfl: 11  •  ONE TOUCH ULTRA TEST test strip, TEST BLOOD SUGAR THREE TIMES DAILY AS INSTRUCTED., Disp: 100 each, Rfl: 11  •  ONETOUCH DELICA LANCETS FINE [facility], USE THREE TIMES DAILY TO TEST, Disp: 100 each, Rfl: 3  •  Semaglutide,0.25 or 0.5MG/DOS, (OZEMPIC, 0.25 OR 0.5 MG/DOSE,) 2 MG/1.5ML solution pen-injector, Inject 0.5 mg under the skin into the appropriate area as directed 1 (One) Time Per Week. 0.5 mg weekly, Disp: 1 pen, Rfl: 11  •  sucralfate (CARAFATE) 1 g tablet, Take 1 tablet by mouth 4 (Four) Times a Day., Disp: 120 tablet, Rfl: 11  •  ticagrelor (BRILINTA) 90 MG tablet tablet, Take 1 tablet by mouth 2 (Two) Times a Day., Disp: 60 tablet, Rfl: 11    Review of Systems    Review of Systems   Constitutional: Positive for fatigue and unexpected weight change. Negative for activity change, appetite change, chills, diaphoresis and fever.   HENT: Negative for congestion, dental problem, drooling, ear discharge, ear pain, facial swelling, mouth sores, postnasal drip, rhinorrhea, sinus pressure, sore throat, tinnitus, trouble swallowing and voice change.    Eyes: Negative for photophobia, pain, discharge, redness, itching and visual disturbance.   Respiratory: Negative for apnea, cough, choking, chest tightness, shortness of breath, wheezing and stridor.    Cardiovascular: Negative for chest pain, palpitations and leg swelling.   Gastrointestinal: Negative for abdominal distention, abdominal pain, constipation, diarrhea, nausea and vomiting.   Endocrine: Positive for polydipsia, polyphagia and polyuria. Negative for cold intolerance and heat intolerance.   Genitourinary: Negative for decreased urine  "volume, difficulty urinating, dysuria, flank pain, frequency, hematuria and urgency.   Musculoskeletal: Negative for arthralgias, back pain, gait problem, joint swelling, myalgias, neck pain and neck stiffness.   Skin: Negative for color change, pallor, rash and wound.   Allergic/Immunologic: Negative for immunocompromised state.   Neurological: Positive for weakness. Negative for dizziness, tremors, seizures, syncope, facial asymmetry, speech difficulty, light-headedness, numbness and headaches.   Hematological: Negative for adenopathy.   Psychiatric/Behavioral: Negative for agitation, behavioral problems, confusion, decreased concentration, dysphoric mood, hallucinations, self-injury, sleep disturbance and suicidal ideas. The patient is not nervous/anxious and is not hyperactive.         Objective:   /80   Pulse 68   Ht 177.8 cm (70\")   Wt 119 kg (263 lb)   SpO2 98%   BMI 37.74 kg/m²     Physical Exam   Constitutional: He is oriented to person, place, and time. He appears well-developed and well-nourished. He is cooperative.   HENT:   Head: Normocephalic and atraumatic.   Right Ear: External ear normal.   Left Ear: External ear normal.   Nose: Nose normal.   Mouth/Throat: Oropharynx is clear and moist. No oropharyngeal exudate.   Eyes: Conjunctivae and EOM are normal. Pupils are equal, round, and reactive to light. No scleral icterus. Right eye exhibits normal extraocular motion. Left eye exhibits normal extraocular motion.   Neck: Neck supple. No JVD present. No muscular tenderness present. No tracheal deviation, no edema and no erythema present. No thyromegaly present.   Cardiovascular: Normal rate, regular rhythm, normal heart sounds and intact distal pulses. Exam reveals no gallop and no friction rub.   No murmur heard.  Pulmonary/Chest: Effort normal and breath sounds normal. No stridor. No respiratory distress. He has no decreased breath sounds. He has no wheezes. He has no rhonchi. He has no " rales. He exhibits no tenderness.   Abdominal: Soft. Bowel sounds are normal. He exhibits no distension and no mass. There is no hepatomegaly. There is no tenderness. There is no rebound and no guarding. No hernia.   Musculoskeletal: Normal range of motion. He exhibits no edema, tenderness or deformity.   Lymphadenopathy:     He has no cervical adenopathy.   Neurological: He is alert and oriented to person, place, and time. He has normal reflexes. No cranial nerve deficit. He exhibits normal muscle tone. Coordination normal.   Skin: Skin is warm. No rash noted. No erythema. No pallor.   Psychiatric: He has a normal mood and affect. His behavior is normal. Judgment and thought content normal.   Nursing note and vitals reviewed.      Lab Review    Results for orders placed or performed in visit on 11/22/19   POC Glucose Fingerstick   Result Value Ref Range    Glucose 120 70 - 130 mg/dL         Assessment/Plan       ICD-10-CM ICD-9-CM   1. Type 2 diabetes mellitus with complication (CMS/[facility]) E11.8 250.90   2. Acquired hypothyroidism E03.9 244.9         I reviewed and summarized records from Chon Nguyễn MD from current year  and I reviewed / ordered labs.   From review of records :    Pt has type 2 diabetes with hyperglycemia     Glycemic Management:   Lab Results   Component Value Date    HGBA1C 6.8 10/25/2019    HGBA1C 6.94 (H) 06/04/2019    HGBA1C 6.9 (H) 10/17/2018     Lab Results   Component Value Date    GLUCOSE 135 (H) 06/19/2019    BUN 12 06/19/2019    CREATININE 0.82 06/19/2019    EGFRIFNONA 95 06/19/2019    BCR 14.6 06/19/2019    K 4.5 06/19/2019    CO2 24.0 06/19/2019    CALCIUM 8.9 06/19/2019    ALBUMIN 4.30 06/18/2019    AST 15 06/18/2019    ALT 17 06/18/2019    ANIONGAP 12.0 06/19/2019     Lab Results   Component Value Date    WBC 9.92 06/19/2019    HGB 14.2 06/19/2019    HCT 42.7 06/19/2019    MCV 82.4 06/19/2019     06/19/2019       Stop mixed insulin     Start ozempic 0.25 mg weekly for 4  weeks then 0.5 mg weekly --stopped due to Nausea, diarrhea     Start Tresiba 40 units at night-35 units     Start humalog 15 units with meals     Diarrhea on metformin     Try to eat 60 to 75 grams of carbohydrate per meal     Fingerstick in office- 120    Discuss pump next appt--Cant afford right now     Can't afford dariel sensors     Needs to check blood sugar more frequently, especially afternoons    Lipid Management  Lab Results   Component Value Date    CHOL 178 10/17/2018    CHOL 259 (H) 10/09/2018    CHOL 271 (H) 11/13/2017     Lab Results   Component Value Date    TRIG 159 10/17/2018    TRIG 686 (H) 10/09/2018    TRIG 259 (H) 11/13/2017     Lab Results   Component Value Date    HDL 33 (L) 10/17/2018    HDL 28 (L) 10/09/2018    HDL 39 (L) 11/13/2017     No components found for: LDLCALC  Lab Results   Component Value Date     (H) 10/17/2018     (H) 10/09/2018     (H) 11/13/2017     No results found for: LDLDIRECT    Pravastatin , has CAD -- change to lipitor 40 mg qhs     Blood Pressure Management:    Vitals:    11/22/19 0759   BP: 128/80   Pulse: 68   SpO2: 98%     Lab Results   Component Value Date    GLUCOSE 135 (H) 06/19/2019    CALCIUM 8.9 06/19/2019     (L) 06/19/2019    K 4.5 06/19/2019    CO2 24.0 06/19/2019    CL 98 06/19/2019    BUN 12 06/19/2019    CREATININE 0.82 06/19/2019    EGFRIFNONA 95 06/19/2019    BCR 14.6 06/19/2019    ANIONGAP 12.0 06/19/2019       Controlled         Microvascular Complication Monitoring:      Eye Exam Evaluation  Pending   -----------    Last Microalbumin-Proteinuria Assessment    No results found for: MALBCRERATIO    No results found for: UTPCR    -----------      Neuropathy  yes     Weight Related:   Wt Readings from Last 3 Encounters:   11/22/19 119 kg (263 lb)   10/25/19 120 kg (264 lb)   09/04/19 121 kg (266 lb 6.4 oz)     Body mass index is 37.74 kg/m².        Diet interventions: moderate (500 kCal/d) deficit diet.      Bone Health    Lab  Results   Component Value Date    CALCIUM 8.9 06/19/2019       Thyroid Health    Lab Results   Component Value Date    TSH 4.260 (H) 05/29/2019    TSH 6.430 (H) 04/02/2019    TSH 14.400 (H) 01/28/2019       On 150 daily --    Will recheck today         Other Diabetes Related Aspects       Lab Results   Component Value Date    [id number] 332 09/23/2014           Last celiac panel     No results found for: GDPABIGA, TTRANSGLIGA, IGA, [id number]      Orders Placed This Encounter   Procedures   • POC Glucose Fingerstick           Return in about 4 weeks (around 12/20/2019), or if symptoms worsen or fail to improve, for Recheck.        This document has been electronically signed by COLLEEN Kirby on November 22, 2019 8:50 AM            A copy of my note was sent to Chon Nguyễn MD    Please see my above opinion and suggestions.

## 2019-12-02 RX ORDER — LEVOTHYROXINE SODIUM 0.15 MG/1
TABLET ORAL
Qty: 90 TABLET | Refills: 1 | Status: SHIPPED | OUTPATIENT
Start: 2019-12-02 | End: 2020-03-02 | Stop reason: SDUPTHER

## 2019-12-06 ENCOUNTER — TELEPHONE (OUTPATIENT)
Dept: FAMILY MEDICINE CLINIC | Facility: CLINIC | Age: 64
End: 2019-12-06

## 2019-12-20 ENCOUNTER — OFFICE VISIT (OUTPATIENT)
Dept: ENDOCRINOLOGY | Facility: CLINIC | Age: 64
End: 2019-12-20

## 2019-12-20 VITALS
HEART RATE: 64 BPM | SYSTOLIC BLOOD PRESSURE: 132 MMHG | WEIGHT: 266.9 LBS | BODY MASS INDEX: 38.21 KG/M2 | DIASTOLIC BLOOD PRESSURE: 74 MMHG | OXYGEN SATURATION: 98 % | HEIGHT: 70 IN

## 2019-12-20 DIAGNOSIS — E11.8 TYPE 2 DIABETES MELLITUS WITH COMPLICATION (HCC): Primary | ICD-10-CM

## 2019-12-20 DIAGNOSIS — E03.9 ACQUIRED HYPOTHYROIDISM: ICD-10-CM

## 2019-12-20 PROCEDURE — 95251 CONT GLUC MNTR ANALYSIS I&R: CPT | Performed by: NURSE PRACTITIONER

## 2019-12-20 PROCEDURE — 99214 OFFICE O/P EST MOD 30 MIN: CPT | Performed by: NURSE PRACTITIONER

## 2019-12-20 NOTE — PROGRESS NOTES
"Pablo Pina is a 64 y.o. male who presents for follow up       Chief Complaint   Patient presents with   • Diabetes       Referring provider       Primary Care Provider    Chon Nguyễn MD    Duration 10 years    Timing - Diabetes is Constant    Quality -  Improving , well- controlled     Severity -  severe    Complications - peripheral neuropathy    Current symptoms/problems  increase appetite, polydipsia and polyuria     Alleviating Factors: Compliance       Side Effects  hypoglycemia    Current diet  in general, a \"healthy\" diet      Current exercise none    Current monitoring regimen: home blood tests - checking 4 x daily     Home blood sugar records:     Hypoglycemia unawareness and nocturnal , none since last visit       Britta Interpretation from Dec 7, 2019- Dec 20, 2019    Above 180- 6%  In target range- 94%  Below 70- 0%        Past Medical History:   Diagnosis Date   • Acquired hypothyroidism    • Acute bronchitis    • Arm pain     Pain in right arm     • Borderline glaucoma    • Clouded consciousness     wooziness when getting up from laying or sitting position    • Coronary arteriosclerosis    • Encounter for immunization    • Essential hypertension    • Fatigue    • Feces, abnormal     Other fecal abnormalities   • Gastroesophageal reflux disease    • Hypercholesterolemia    • Hyperlipidemia    • Hypermetropia    • Hypertensive disorder    • Impacted cerumen    • Impaired glucose tolerance    • Impotence    • Need for immunization against influenza    • Obstructive sleep apnea syndrome    • Overweight    • Patient noncompliance     with other medical treatment and regimen    • Patient noncompliance, general    • Presbyopia    • Sacroiliac joint pain    • Sensorineural hearing loss of both ears    • Skin tag    • Type 2 diabetes mellitus (CMS/HCC)      Family History   Problem Relation Age of Onset   • Heart attack Brother 46     Social History     Tobacco Use   • Smoking status: Former " Smoker   • Smokeless tobacco: Never Used   • Tobacco comment: Quit around 2005   Substance Use Topics   • Alcohol use: No   • Drug use: No         Current Outpatient Medications:   •  Alcohol Swabs 70 % pads, 1 each 2 (Two) Times a Day., Disp: 100 each, Rfl: 11  •  aspirin 81 MG chewable tablet, Chew 81 mg Every Night., Disp: , Rfl:   •  atorvastatin (LIPITOR) 40 MG tablet, Take 1 tablet by mouth Daily., Disp: 30 tablet, Rfl: 11  •  carvedilol (COREG) 3.125 MG tablet, Take 1 tablet by mouth 2 (Two) Times a Day With Meals., Disp: 60 tablet, Rfl: 11  •  cetirizine (zyrTEC) 10 MG tablet, Take 1 tablet by mouth Daily., Disp: 30 tablet, Rfl: 11  •  Continuous Blood Gluc  (FREESTYLE RAJESH 14 DAY READER) device, USE AS DIRECTED TO MONITOR BLOOD SUGAR, Disp: 1 Device, Rfl: 0  •  Continuous Blood Gluc Sensor (FREESTYLE RAJESH 14 DAY SENSOR) mis, TEST ONCE DAILY AND CHANGE SENSOR EVERY 14 DAYS., Disp: 2 each, Rfl: 11  •  famotidine (PEPCID) 40 MG tablet, Take 1 tablet by mouth Daily., Disp: 90 tablet, Rfl: 3  •  fluticasone (FLONASE) 50 MCG/ACT nasal spray, 2 sprays into the nostril(s) as directed by provider Daily., Disp: 1 bottle, Rfl: 11  •  furosemide (LASIX) 20 MG tablet, Take 20 mg by mouth as needed., Disp: , Rfl: 12  •  Insulin Glargine (LANTUS SOLOSTAR) 100 UNIT/ML injection pen, Inject 40 Units under the skin into the appropriate area as directed Every Night., Disp: 4 pen, Rfl: 11  •  Insulin Lispro (HUMALOG KWIKPEN) 200 UNIT/ML solution pen-injector, Inject 15 Units under the skin into the appropriate area as directed 3 (Three) Times a Day With Meals., Disp: 3 pen, Rfl: 11  •  Insulin Pen Needle (B-D UF III MINI PEN NEEDLES) 31G X 5 MM misc, Use 4 times daily  , ICD10 code is E11.9, Disp: 120 each, Rfl: 11  •  latanoprost (XALATAN) 0.005 % ophthalmic solution, PLACE 1 DROP INTO EACH EYE AT BEDTIME, Disp: , Rfl: 6  •  levothyroxine (SYNTHROID, LEVOTHROID) 150 MCG tablet, Take 1 tablet by mouth Daily.  (Patient taking differently: Take 150 mcg by mouth Every Night.), Disp: 90 tablet, Rfl: 1  •  levothyroxine (SYNTHROID, LEVOTHROID) 150 MCG tablet, TAKE 1 TABLET BY MOUTH EVERY MORNING ON AN EMPTY STOMACH, Disp: 90 tablet, Rfl: 1  •  lisinopril (PRINIVIL,ZESTRIL) 5 MG tablet, Take 1 tablet by mouth Daily., Disp: 90 tablet, Rfl: 3  •  nitroglycerin (NITROSTAT) 0.4 MG SL tablet, DISSOLVE 1 TABLET BY MOUTH UNDER THE TONGUE AS NEEDED FOR CHEST PAIN, DO NOT EXCEED MORE THAN 3 DOSES IN 15 MINUTES, Disp: 25 tablet, Rfl: 11  •  ONE TOUCH ULTRA TEST test strip, TEST BLOOD SUGAR THREE TIMES DAILY AS INSTRUCTED., Disp: 100 each, Rfl: 11  •  ONETOUCH DELICA LANCETS FINE [facility], USE THREE TIMES DAILY TO TEST, Disp: 100 each, Rfl: 3  •  Semaglutide,0.25 or 0.5MG/DOS, (OZEMPIC, 0.25 OR 0.5 MG/DOSE,) 2 MG/1.5ML solution pen-injector, Inject 0.5 mg under the skin into the appropriate area as directed 1 (One) Time Per Week. 0.5 mg weekly, Disp: 1 pen, Rfl: 11  •  sucralfate (CARAFATE) 1 g tablet, Take 1 tablet by mouth 4 (Four) Times a Day., Disp: 120 tablet, Rfl: 11  •  ticagrelor (BRILINTA) 90 MG tablet tablet, Take 1 tablet by mouth 2 (Two) Times a Day., Disp: 60 tablet, Rfl: 11    Review of Systems    Review of Systems   Constitutional: Positive for fatigue and unexpected weight change. Negative for activity change, appetite change, chills, diaphoresis and fever.   HENT: Negative for congestion, dental problem, drooling, ear discharge, ear pain, facial swelling, mouth sores, postnasal drip, rhinorrhea, sinus pressure, sore throat, tinnitus, trouble swallowing and voice change.    Eyes: Negative for photophobia, pain, discharge, redness, itching and visual disturbance.   Respiratory: Negative for apnea, cough, choking, chest tightness, shortness of breath, wheezing and stridor.    Cardiovascular: Negative for chest pain, palpitations and leg swelling.   Gastrointestinal: Negative for abdominal distention, abdominal pain,  "constipation, diarrhea, nausea and vomiting.   Endocrine: Positive for polydipsia, polyphagia and polyuria. Negative for cold intolerance and heat intolerance.   Genitourinary: Negative for decreased urine volume, difficulty urinating, dysuria, flank pain, frequency, hematuria and urgency.   Musculoskeletal: Negative for arthralgias, back pain, gait problem, joint swelling, myalgias, neck pain and neck stiffness.   Skin: Negative for color change, pallor, rash and wound.   Allergic/Immunologic: Negative for immunocompromised state.   Neurological: Positive for weakness. Negative for dizziness, tremors, seizures, syncope, facial asymmetry, speech difficulty, light-headedness, numbness and headaches.   Hematological: Negative for adenopathy.   Psychiatric/Behavioral: Negative for agitation, behavioral problems, confusion, decreased concentration, dysphoric mood, hallucinations, self-injury, sleep disturbance and suicidal ideas. The patient is not nervous/anxious and is not hyperactive.         Objective:   /74   Pulse 64   Ht 177.8 cm (70\")   Wt 121 kg (266 lb 14.4 oz)   SpO2 98%   BMI 38.30 kg/m²     Physical Exam   Constitutional: He is oriented to person, place, and time. He appears well-developed and well-nourished. He is cooperative.   HENT:   Head: Normocephalic and atraumatic.   Right Ear: External ear normal.   Left Ear: External ear normal.   Nose: Nose normal.   Mouth/Throat: Oropharynx is clear and moist. No oropharyngeal exudate.   Eyes: Pupils are equal, round, and reactive to light. Conjunctivae and EOM are normal. No scleral icterus. Right eye exhibits normal extraocular motion. Left eye exhibits normal extraocular motion.   Neck: Neck supple. No JVD present. No muscular tenderness present. No tracheal deviation, no edema and no erythema present. No thyromegaly present.   Cardiovascular: Normal rate, regular rhythm, normal heart sounds and intact distal pulses. Exam reveals no gallop and no " friction rub.   No murmur heard.  Pulmonary/Chest: Effort normal and breath sounds normal. No stridor. No respiratory distress. He has no decreased breath sounds. He has no wheezes. He has no rhonchi. He has no rales. He exhibits no tenderness.   Abdominal: Soft. Bowel sounds are normal. He exhibits no distension and no mass. There is no hepatomegaly. There is no tenderness. There is no rebound and no guarding. No hernia.   Musculoskeletal: Normal range of motion. He exhibits no edema, tenderness or deformity.   Lymphadenopathy:     He has no cervical adenopathy.   Neurological: He is alert and oriented to person, place, and time. He has normal reflexes. No cranial nerve deficit. He exhibits normal muscle tone. Coordination normal.   Skin: Skin is warm. No rash noted. No erythema. No pallor.   Psychiatric: He has a normal mood and affect. His behavior is normal. Judgment and thought content normal.   Nursing note and vitals reviewed.      Lab Review    Results for orders placed or performed in visit on 11/22/19   POC Glucose Fingerstick   Result Value Ref Range    Glucose 120 70 - 130 mg/dL         Assessment/Plan       ICD-10-CM ICD-9-CM   1. Type 2 diabetes mellitus with complication (CMS/[facility]) E11.8 250.90   2. Acquired hypothyroidism E03.9 244.9         Glycemic Management:   Lab Results   Component Value Date    HGBA1C 6.8 10/25/2019    HGBA1C 6.94 (H) 06/04/2019    HGBA1C 6.9 (H) 10/17/2018     Lab Results   Component Value Date    GLUCOSE 123 (H) 11/22/2019    BUN 10 11/22/2019    CREATININE 0.91 11/22/2019    EGFRIFNONA 84 11/22/2019    BCR 11.0 11/22/2019    K 4.4 11/22/2019    CO2 28.2 11/22/2019    CALCIUM 10.1 11/22/2019    ALBUMIN 4.60 11/22/2019    AST 12 11/22/2019    ALT 19 11/22/2019    ANIONGAP 15.8 (H) 11/22/2019     Lab Results   Component Value Date    WBC 8.31 11/22/2019    HGB 15.5 11/22/2019    HCT 47.1 11/22/2019    MCV 82.8 11/22/2019     11/22/2019       Stop mixed insulin     Start  ozempic 0.25 mg weekly for 4 weeks then 0.5 mg weekly --stopped due to Nausea, diarrhea         Diarrhea on metformin     Tresiba 40 units at night-35 units     Now Taking Lantus- 20-30    Humalog 15-20 units with meals       Try to eat 60 to 75 grams of carbohydrate per meal     Now using Britta sensors     Well controlled, compliant with medication.     Britta Interpretation from Dec 7, 2019- Dec 20, 2019    Above 180- 6%  In target range- 94%  Below 70- 0%    Avg blood sugar 132         Lipid Management  Lab Results   Component Value Date    CHOL 132 11/22/2019    CHOL 178 10/17/2018    CHOL 259 (H) 10/09/2018     Lab Results   Component Value Date    TRIG 191 (H) 11/22/2019    TRIG 159 10/17/2018    TRIG 686 (H) 10/09/2018     Lab Results   Component Value Date    HDL 36 (L) 11/22/2019    HDL 33 (L) 10/17/2018    HDL 28 (L) 10/09/2018     No components found for: LDLCALC  Lab Results   Component Value Date    LDL 58 11/22/2019     (H) 10/17/2018     (H) 10/09/2018     No results found for: LDLDIRECT    Pravastatin , has CAD -- change to lipitor 40 mg qhs     Blood Pressure Management:    Vitals:    12/20/19 0757   BP: 132/74   Pulse: 64   SpO2: 98%     Lab Results   Component Value Date    GLUCOSE 123 (H) 11/22/2019    CALCIUM 10.1 11/22/2019     11/22/2019    K 4.4 11/22/2019    CO2 28.2 11/22/2019    CL 99 11/22/2019    BUN 10 11/22/2019    CREATININE 0.91 11/22/2019    EGFRIFNONA 84 11/22/2019    BCR 11.0 11/22/2019    ANIONGAP 15.8 (H) 11/22/2019       Controlled         Microvascular Complication Monitoring:      Eye Exam Evaluation  Pending   -----------    Last Microalbumin-Proteinuria Assessment    No results found for: MALBCRERATIO    No results found for: UTPCR    -----------      Neuropathy  yes     Weight Related:   Wt Readings from Last 3 Encounters:   12/20/19 121 kg (266 lb 14.4 oz)   11/22/19 119 kg (263 lb)   10/25/19 120 kg (264 lb)     Body mass index is 38.3  kg/m².        Diet interventions: moderate (500 kCal/d) deficit diet.      Bone Health    Lab Results   Component Value Date    CALCIUM 10.1 11/22/2019    [id number] 23.3 (L) 11/22/2019     Start taking Vit D    Recently bought otc Vit d3    Thyroid Health    Lab Results   Component Value Date    TSH 0.352 11/22/2019    TSH 4.260 (H) 05/29/2019    TSH 6.430 (H) 04/02/2019       On 150 daily --    Continue on current medication     We will continue to monitor       Other Diabetes Related Aspects       Lab Results   Component Value Date    IVLGHJZM03 332 09/23/2014           Last celiac panel     No results found for: GDPABIGA, TTRANSGLIGA, IGA, [id number]      Orders Placed This Encounter   Procedures   • TSH     Standing Status:   Future     Standing Expiration Date:   12/20/2020   • Vitamin D 25 Hydroxy     Standing Status:   Future     Standing Expiration Date:   12/19/2020   • Comprehensive Metabolic Panel     Standing Status:   Future     Standing Expiration Date:   12/20/2020   • Hemoglobin A1c     Standing Status:   Future     Standing Expiration Date:   12/20/2020   • Vitamin B12     Standing Status:   Future     Standing Expiration Date:   12/20/2020   • Lipid Panel     These lab test should be done fasting. This means do not eat or drink for at least 12 hours prior to getting your blood drawn.     Standing Status:   Future     Standing Expiration Date:   12/20/2020   • CBC & Differential     Standing Status:   Future     Standing Expiration Date:   12/20/2020     Order Specific Question:   Manual Differential     Answer:   No           Return in about 3 months (around 3/20/2020), or if symptoms worsen or fail to improve, for Recheck.        This document has been electronically signed by COLLEEN Kirby on December 20, 2019 8:38 AM            A copy of my note was sent to Chon Nguyễn MD    Please see my above opinion and suggestions.

## 2020-03-02 ENCOUNTER — TELEPHONE (OUTPATIENT)
Dept: FAMILY MEDICINE CLINIC | Facility: CLINIC | Age: 65
End: 2020-03-02

## 2020-03-02 RX ORDER — LEVOTHYROXINE SODIUM 0.15 MG/1
150 TABLET ORAL
Qty: 90 TABLET | Refills: 0 | Status: SHIPPED | OUTPATIENT
Start: 2020-03-02 | End: 2020-03-16

## 2020-03-02 NOTE — TELEPHONE ENCOUNTER
Patient's wife called and requested a refill on his levothyroxine (SYNTHROID, LEVOTHROID) 150 MCG tablet Rx.    Verified [facility] in [place].    Patient can be contacted at [phone] to clarify and confirm if needed.

## 2020-03-09 RX ORDER — RANITIDINE 150 MG/1
150 TABLET ORAL 2 TIMES DAILY
Qty: 180 TABLET | Refills: 3 | Status: SHIPPED | OUTPATIENT
Start: 2020-03-09 | End: 2022-08-29

## 2020-03-16 RX ORDER — LEVOTHYROXINE SODIUM 0.15 MG/1
TABLET ORAL
Qty: 90 TABLET | Refills: 0 | Status: SHIPPED | OUTPATIENT
Start: 2020-03-16 | End: 2020-07-02 | Stop reason: SDUPTHER

## 2020-03-18 DIAGNOSIS — R05.9 COUGH: Primary | ICD-10-CM

## 2020-03-18 RX ORDER — PROMETHAZINE HYDROCHLORIDE AND CODEINE PHOSPHATE 6.25; 1 MG/5ML; MG/5ML
SOLUTION ORAL
Qty: 240 ML | Refills: 0 | Status: SHIPPED | OUTPATIENT
Start: 2020-03-18 | End: 2021-05-05

## 2020-04-09 ENCOUNTER — NURSE TRIAGE (OUTPATIENT)
Dept: CALL CENTER | Facility: HOSPITAL | Age: 65
End: 2020-04-09

## 2020-04-09 ENCOUNTER — OFFICE VISIT (OUTPATIENT)
Dept: FAMILY MEDICINE CLINIC | Facility: CLINIC | Age: 65
End: 2020-04-09

## 2020-04-09 DIAGNOSIS — J30.2 SEASONAL ALLERGIC RHINITIS, UNSPECIFIED TRIGGER: Primary | ICD-10-CM

## 2020-04-09 DIAGNOSIS — R05.9 COUGH: ICD-10-CM

## 2020-04-09 PROCEDURE — 99213 OFFICE O/P EST LOW 20 MIN: CPT | Performed by: FAMILY MEDICINE

## 2020-04-09 RX ORDER — PSEUDOEPHEDRINE HCL 30 MG
30 TABLET ORAL EVERY 4 HOURS PRN
Qty: 30 TABLET | Refills: 1 | Status: SHIPPED | OUTPATIENT
Start: 2020-04-09 | End: 2022-04-18

## 2020-04-09 RX ORDER — CLOPIDOGREL BISULFATE 75 MG/1
75 TABLET ORAL DAILY
COMMUNITY
Start: 2020-04-07 | End: 2022-08-29 | Stop reason: SDUPTHER

## 2020-04-09 RX ORDER — ALBUTEROL SULFATE 90 UG/1
2 AEROSOL, METERED RESPIRATORY (INHALATION) EVERY 4 HOURS PRN
Qty: 1 INHALER | Refills: 11 | Status: SHIPPED | OUTPATIENT
Start: 2020-04-09 | End: 2023-01-26 | Stop reason: SDUPTHER

## 2020-04-09 RX ORDER — METHYLPREDNISOLONE 4 MG/1
TABLET ORAL
Qty: 21 TABLET | Refills: 0 | Status: SHIPPED | OUTPATIENT
Start: 2020-04-09 | End: 2021-05-05

## 2020-04-09 NOTE — PROGRESS NOTES
Subjective   Pablo Pina is a 64 y.o. male.     History of Present Illness     Phone visit  Sinus/allergy and cough a week or so  Taking zyrtec and flonase, helping but still having congestion in chest and sinus.  Jaws hurt? In past he says a steroid shot would help.   When he takes a puff of inhaler, it really helps also    Review of Systems   Constitutional: Negative for fever.   HENT: Positive for congestion, rhinorrhea and sinus pain.    Respiratory: Positive for cough.            There were no vitals taken for this visit.      Objective     Physical Exam        PAST MEDICAL HISTORY     Past Medical History:   Diagnosis Date   • Acquired hypothyroidism    • Acute bronchitis    • Arm pain     Pain in right arm     • Borderline glaucoma    • Clouded consciousness     wooziness when getting up from laying or sitting position    • Coronary arteriosclerosis    • Encounter for immunization    • Essential hypertension    • Fatigue    • Feces, abnormal     Other fecal abnormalities   • Gastroesophageal reflux disease    • Hypercholesterolemia    • Hyperlipidemia    • Hypermetropia    • Hypertensive disorder    • Impacted cerumen    • Impaired glucose tolerance    • Impotence    • Need for immunization against influenza    • Obstructive sleep apnea syndrome    • Overweight    • Patient noncompliance     with other medical treatment and regimen    • Patient noncompliance, general    • Presbyopia    • Sacroiliac joint pain    • Sensorineural hearing loss of both ears    • Skin tag    • Type 2 diabetes mellitus (CMS/HCC)       PAST SURGICAL HISTORY     Past Surgical History:   Procedure Laterality Date   • CARDIAC CATHETERIZATION N/A 10/9/2018    Procedure: Left Heart Cath;  Surgeon: Lit Rodriguez MD;  Location: [facility] INVASIVE LOCATION;  Service: Cardiology   • CARDIAC CATHETERIZATION N/A 6/18/2019    Procedure: Left Heart Cath;  Surgeon: Lit Rodriguez MD;  Location: [facility] INVASIVE LOCATION;   Service: Cardiology   • CORONARY ARTERY BYPASS GRAFT  01/26/2005    CABG (1) - LIMA to LAD, SVG to D1, SVG to OM,SVG to PDA.   • INJECTION OF MEDICATION  10/01/2015    Kenalog (1) - OBI Nguyễn   • OTHER SURGICAL HISTORY      Coronary artery stent placement (1)   • OTHER SURGICAL HISTORY  06/02/2012    Remove Impacted Cerumen 01836 (1) - OLYA Hammond   • OTHER SURGICAL HISTORY  03/11/2014    Skin Tag Removal (Each Additional 10 96181 (1) - OBI Nguyễn   • OTHER SURGICAL HISTORY  10/01/2015    Nebulizer Treatment 58342 (1) - OBI Nguyễn      SOCIAL HISTORY     Social History     Socioeconomic History   • Marital status:      Spouse name: Not on file   • Number of children: Not on file   • Years of education: Not on file   • Highest education level: Not on file   Tobacco Use   • Smoking status: Former Smoker   • Smokeless tobacco: Never Used   • Tobacco comment: Quit around 2005   Substance and Sexual Activity   • Alcohol use: No   • Drug use: No   • Sexual activity: Never      ALLERGIES   Patient has no known allergies.   MEDICATIONS     Current Outpatient Medications   Medication Sig Dispense Refill   • Alcohol Swabs 70 % pads 1 each 2 (Two) Times a Day. 100 each 11   • aspirin 81 MG chewable tablet Chew 81 mg Every Night.     • atorvastatin (LIPITOR) 40 MG tablet Take 1 tablet by mouth Daily. 30 tablet 11   • carvedilol (COREG) 3.125 MG tablet Take 1 tablet by mouth 2 (Two) Times a Day With Meals. 60 tablet 11   • cetirizine (zyrTEC) 10 MG tablet Take 1 tablet by mouth Daily. 30 tablet 11   • clopidogrel (PLAVIX) 75 MG tablet Take 75 mg by mouth Daily.     • Continuous Blood Gluc  (FREESTYLE RAJESH 14 DAY READER) device USE AS DIRECTED TO MONITOR BLOOD SUGAR 1 Device 0   • Continuous Blood Gluc Sensor (FREESTYLE RAJESH 14 DAY SENSOR) misc TEST ONCE DAILY AND CHANGE SENSOR EVERY 14 DAYS. 2 each 11   • furosemide (LASIX) 20 MG tablet Take 20 mg by mouth as needed.  12   • Insulin Lispro (HUMALOG KWIKPEN) 200 UNIT/ML  solution pen-injector Inject 15 Units under the skin into the appropriate area as directed 3 (Three) Times a Day With Meals. 3 pen 11   • Insulin Pen Needle (B-D UF III MINI PEN NEEDLES) 31G X 5 MM misc Use 4 times daily  , ICD10 code is E11.9 120 each 11   • latanoprost (XALATAN) 0.005 % ophthalmic solution PLACE 1 DROP INTO EACH EYE AT BEDTIME  6   • levothyroxine (SYNTHROID, LEVOTHROID) 150 MCG tablet Take 1 tablet by mouth Daily. (Patient taking differently: Take 150 mcg by mouth Every Night.) 90 tablet 1   • levothyroxine (SYNTHROID, LEVOTHROID) 150 MCG tablet TAKE 1 TABLET BY MOUTH EVERY DAY BEFORE BREAKFAST 90 tablet 0   • lisinopril (PRINIVIL,ZESTRIL) 5 MG tablet Take 1 tablet by mouth Daily. 90 tablet 3   • nitroglycerin (NITROSTAT) 0.4 MG SL tablet DISSOLVE 1 TABLET BY MOUTH UNDER THE TONGUE AS NEEDED FOR CHEST PAIN, DO NOT EXCEED MORE THAN 3 DOSES IN 15 MINUTES 25 tablet 11   • ONE TOUCH ULTRA TEST test strip TEST BLOOD SUGAR THREE TIMES DAILY AS INSTRUCTED. 100 each 11   • ONETOUCH DELICA LANCETS FINE misc USE THREE TIMES DAILY TO TEST 100 each 3   • promethazine-codeine (PHENERGAN with CODEINE) 6.25-10 MG/5ML solution TAKE 5 ML BY MOUTH EVERY 6 HOURS AS NEEDED FOR COUGH 240 mL 0   • raNITIdine (ZANTAC) 150 MG tablet Take 1 tablet by mouth 2 (Two) Times a Day. 180 tablet 3   • sucralfate (CARAFATE) 1 g tablet Take 1 tablet by mouth 4 (Four) Times a Day. 120 tablet 11   • albuterol sulfate  (90 Base) MCG/ACT inhaler Inhale 2 puffs Every 4 (Four) Hours As Needed for Wheezing. 1 inhaler 11   • fluticasone (FLONASE) 50 MCG/ACT nasal spray 2 sprays into the nostril(s) as directed by provider Daily. 1 bottle 11   • Insulin Glargine (LANTUS SOLOSTAR) 100 UNIT/ML injection pen Inject 40 Units under the skin into the appropriate area as directed Every Night. 4 pen 11   • methylPREDNISolone (MEDROL, COLTEN,) 4 MG tablet Take as directed on package instructions. 21 tablet 0   • pseudoephedrine (Sudafed) 30 MG  tablet Take 1 tablet by mouth Every 4 (Four) Hours As Needed for Congestion. 30 tablet 1   • Semaglutide,0.25 or 0.5MG/DOS, (OZEMPIC, 0.25 OR 0.5 MG/DOSE,) 2 MG/1.5ML solution pen-injector Inject 0.5 mg under the skin into the appropriate area as directed 1 (One) Time Per Week. 0.5 mg weekly 1 pen 11     No current facility-administered medications for this visit.         The following portions of the patient's history were reviewed and updated as appropriate: allergies, current medications, past family history, past medical history, past social history, past surgical history and problem list.        Assessment/Plan   Pablo was seen today for sinus problem and allergies.    Diagnoses and all orders for this visit:    Seasonal allergic rhinitis, unspecified trigger    Cough    Other orders  -     methylPREDNISolone (MEDROL, COLTEN,) 4 MG tablet; Take as directed on package instructions.  -     pseudoephedrine (Sudafed) 30 MG tablet; Take 1 tablet by mouth Every 4 (Four) Hours As Needed for Congestion.  -     albuterol sulfate  (90 Base) MCG/ACT inhaler; Inhale 2 puffs Every 4 (Four) Hours As Needed for Wheezing.      He has plenty of phen with cod.   Warned side effects, not sleeping etc.  Use inhaler qid    This visit has been rescheduled as a phone visit to comply with patient safety concerns in accordance with CDC recommendations. Total time of discussion was 15 minutes.               No follow-ups on file.                  This document has been electronically signed by Chon Nguyễn MD on April 9, 2020 13:28

## 2020-04-09 NOTE — TELEPHONE ENCOUNTER
"Pt has watery eyes, facial soreness, ears popping, coughing and a runny nose. Is considered high risk. Advised him to do a telephone visit with Dr. Nguyễn. I call office and set this up for him.     Reason for Disposition  • HIGH RISK patient (e.g., age > 64 years, diabetes, heart or lung disease, weak immune system)    Additional Information  • Negative: SEVERE difficulty breathing (e.g., struggling for each breath, speaks in single words)  • Negative: Difficult to awaken or acting confused (e.g., disoriented, slurred speech)  • Negative: Bluish (or gray) lips or face now  • Negative: Shock suspected (e.g., cold/pale/clammy skin, too weak to stand, low BP, rapid pulse)  • Negative: Sounds like a life-threatening emergency to the triager  • Negative: [1] COVID-19 suspected (e.g., cough, fever, shortness of breath) AND [2] public health department recommends testing  • Negative: [1] COVID-19 exposure AND [2] no symptoms  • Negative: COVID-19 and Breastfeeding, questions about  • Negative: SEVERE or constant chest pain (Exception: mild central chest pain, present only when coughing)  • Negative: MODERATE difficulty breathing (e.g., speaks in phrases, SOB even at rest, pulse 100-120)  • Negative: Patient sounds very sick or weak to the triager  • Negative: MILD difficulty breathing (e.g., minimal/no SOB at rest, SOB with walking, pulse <100)  • Negative: Chest pain  • Negative: Fever > 103 F (39.4 C)  • Negative: [1] Fever > 101 F (38.3 C) AND [2] age > 60  • Negative: [1] Fever > 100.0 F (37.8 C) AND [2] bedridden (e.g., nursing home patient, CVA, chronic illness, recovering from surgery)    Answer Assessment - Initial Assessment Questions  1. COVID-19 DIAGNOSIS: \"Who made your Coronavirus (COVID-19) diagnosis?\" \"Was it confirmed by a positive lab test?\" If not diagnosed by a HCP, ask \"Are there lots of cases (community spread) where you live?\" (See public health department website, if unsure)    * MAJOR community " "spread: high number of cases; numbers of cases are increasing; many people hospitalized.    * MINOR community spread: low number of cases; not increasing; few or no people hospitalized      Major  2. ONSET: \"When did the COVID-19 symptoms start?\"       For 2 weeks  3. WORST SYMPTOM: \"What is your worst symptom?\" (e.g., cough, fever, shortness of breath, muscle aches)      cough  4. COUGH: \"How bad is the cough?\"        bad  5. FEVER: \"Do you have a fever?\" If so, ask: \"What is your temperature, how was it measured, and when did it start?\"      No fever  6. RESPIRATORY STATUS: \"Describe your breathing?\" (e.g., shortness of breath, wheezing, unable to speak)       Is SOA but this is nothing new for the patient  7. BETTER-SAME-WORSE: \"Are you getting better, staying the same or getting worse compared to yesterday?\"  If getting worse, ask, \"In what way?\"      Getting worse  8. HIGH RISK DISEASE: \"Do you have any chronic medical problems?\" (e.g., asthma, heart or lung disease, weak immune system, etc.)      Heart diesease  9. PREGNANCY: \"Is there any chance you are pregnant?\" \"When was your last menstrual period?\"      na  10. OTHER SYMPTOMS: \"Do you have any other symptoms?\"  (e.g., runny nose, headache, sore throat, loss of smell)        Watery eyes, facial soreness, ears popping, coughing, and running nose.    Protocols used: CORONAVIRUS (COVID-19) DIAGNOSED OR SUSPECTED-ADULT-AH      "

## 2020-04-09 NOTE — PROGRESS NOTES
You have chosen to receive care through a telephone visit today. Do you consent to use a telephone visit for your medical care today? Yes

## 2020-05-28 ENCOUNTER — TELEMEDICINE (OUTPATIENT)
Dept: ENDOCRINOLOGY | Facility: CLINIC | Age: 65
End: 2020-05-28

## 2020-05-28 DIAGNOSIS — E11.59 HYPERTENSION ASSOCIATED WITH DIABETES (HCC): ICD-10-CM

## 2020-05-28 DIAGNOSIS — E03.9 ACQUIRED HYPOTHYROIDISM: ICD-10-CM

## 2020-05-28 DIAGNOSIS — E55.9 VITAMIN D DEFICIENCY: ICD-10-CM

## 2020-05-28 DIAGNOSIS — E11.8 TYPE 2 DIABETES MELLITUS WITH COMPLICATION (HCC): Primary | ICD-10-CM

## 2020-05-28 DIAGNOSIS — E11.69 MIXED DIABETIC HYPERLIPIDEMIA ASSOCIATED WITH TYPE 2 DIABETES MELLITUS (HCC): ICD-10-CM

## 2020-05-28 DIAGNOSIS — E78.2 MIXED DIABETIC HYPERLIPIDEMIA ASSOCIATED WITH TYPE 2 DIABETES MELLITUS (HCC): ICD-10-CM

## 2020-05-28 DIAGNOSIS — I15.2 HYPERTENSION ASSOCIATED WITH DIABETES (HCC): ICD-10-CM

## 2020-05-28 PROCEDURE — 99214 OFFICE O/P EST MOD 30 MIN: CPT | Performed by: NURSE PRACTITIONER

## 2020-05-28 NOTE — PROGRESS NOTES
" Pablo Pina is a 64 y.o. male who presents for follow up     You have chosen to receive care through a telehealth visit.  Do you consent to use a video/audio connection for your medical care today? Yes    This video visit last 11 minutes       Chief Complaint   Patient presents with   • Diabetes   • Hypothyroidism       Referring provider       Primary Care Provider    Chon Nguyễn MD    Duration 10 years    Timing - Diabetes is Constant    Quality -  Improving , well- controlled     Severity -  severe    Complications - peripheral neuropathy    Current symptoms/problems  increase appetite, polydipsia and polyuria     Alleviating Factors: Compliance       Side Effects  hypoglycemia    Current diet  in general, a \"healthy\" diet      Current exercise none    Current monitoring regimen: home blood tests - checking 4 x daily     Home blood sugar records:     Hypoglycemia unawareness and nocturnal , none since last visit     Patient states that his blood sugar has been running well     Past Medical History:   Diagnosis Date   • Acquired hypothyroidism    • Acute bronchitis    • Arm pain     Pain in right arm     • Borderline glaucoma    • Clouded consciousness     wooziness when getting up from laying or sitting position    • Coronary arteriosclerosis    • Encounter for immunization    • Essential hypertension    • Fatigue    • Feces, abnormal     Other fecal abnormalities   • Gastroesophageal reflux disease    • Hypercholesterolemia    • Hyperlipidemia    • Hypermetropia    • Hypertensive disorder    • Impacted cerumen    • Impaired glucose tolerance    • Impotence    • Need for immunization against influenza    • Obstructive sleep apnea syndrome    • Overweight    • Patient noncompliance     with other medical treatment and regimen    • Patient noncompliance, general    • Presbyopia    • Sacroiliac joint pain    • Sensorineural hearing loss of both ears    • Skin tag    • Type 2 diabetes mellitus " (CMS/[facility])      Family History   Problem Relation Age of Onset   • Heart attack Brother 46     Social History     Tobacco Use   • Smoking status: Former Smoker   • Smokeless tobacco: Never Used   • Tobacco comment: Quit around 2005   Substance Use Topics   • Alcohol use: No   • Drug use: No         Current Outpatient Medications:   •  albuterol sulfate  (90 Base) MCG/ACT inhaler, Inhale 2 puffs Every 4 (Four) Hours As Needed for Wheezing., Disp: 1 inhaler, Rfl: 11  •  Alcohol Swabs 70 % pads, 1 each 2 (Two) Times a Day., Disp: 100 each, Rfl: 11  •  aspirin 81 MG chewable tablet, Chew 81 mg Every Night., Disp: , Rfl:   •  atorvastatin (LIPITOR) 40 MG tablet, Take 1 tablet by mouth Daily., Disp: 30 tablet, Rfl: 11  •  carvedilol (COREG) 3.125 MG tablet, Take 1 tablet by mouth 2 (Two) Times a Day With Meals., Disp: 60 tablet, Rfl: 11  •  cetirizine (zyrTEC) 10 MG tablet, Take 1 tablet by mouth Daily., Disp: 30 tablet, Rfl: 11  •  clopidogrel (PLAVIX) 75 MG tablet, Take 75 mg by mouth Daily., Disp: , Rfl:   •  Continuous Blood Gluc  (FREESTYLE RAJESH 14 DAY READER) device, USE AS DIRECTED TO MONITOR BLOOD SUGAR, Disp: 1 Device, Rfl: 0  •  Continuous Blood Gluc Sensor (FREESTYLE RAJESH 14 DAY SENSOR) mis, TEST ONCE DAILY AND CHANGE SENSOR EVERY 14 DAYS., Disp: 2 each, Rfl: 11  •  fluticasone (FLONASE) 50 MCG/ACT nasal spray, 2 sprays into the nostril(s) as directed by provider Daily., Disp: 1 bottle, Rfl: 11  •  furosemide (LASIX) 20 MG tablet, Take 20 mg by mouth as needed., Disp: , Rfl: 12  •  Insulin Glargine (LANTUS SOLOSTAR) 100 UNIT/ML injection pen, Inject 40 Units under the skin into the appropriate area as directed Every Night., Disp: 4 pen, Rfl: 11  •  Insulin Lispro (HUMALOG KWIKPEN) 200 UNIT/ML solution pen-injector, Inject 15 Units under the skin into the appropriate area as directed 3 (Three) Times a Day With Meals., Disp: 3 pen, Rfl: 11  •  Insulin Pen Needle (B-D UF III MINI PEN NEEDLES) 31G  X 5 MM misc, Use 4 times daily  , ICD10 code is E11.9, Disp: 120 each, Rfl: 11  •  latanoprost (XALATAN) 0.005 % ophthalmic solution, PLACE 1 DROP INTO EACH EYE AT BEDTIME, Disp: , Rfl: 6  •  levothyroxine (SYNTHROID, LEVOTHROID) 150 MCG tablet, Take 1 tablet by mouth Daily. (Patient taking differently: Take 150 mcg by mouth Every Night.), Disp: 90 tablet, Rfl: 1  •  levothyroxine (SYNTHROID, LEVOTHROID) 150 MCG tablet, TAKE 1 TABLET BY MOUTH EVERY DAY BEFORE BREAKFAST, Disp: 90 tablet, Rfl: 0  •  lisinopril (PRINIVIL,ZESTRIL) 5 MG tablet, Take 1 tablet by mouth Daily., Disp: 90 tablet, Rfl: 3  •  methylPREDNISolone (MEDROL, COLTEN,) 4 MG tablet, Take as directed on package instructions., Disp: 21 tablet, Rfl: 0  •  nitroglycerin (NITROSTAT) 0.4 MG SL tablet, DISSOLVE 1 TABLET BY MOUTH UNDER THE TONGUE AS NEEDED FOR CHEST PAIN, DO NOT EXCEED MORE THAN 3 DOSES IN 15 MINUTES, Disp: 25 tablet, Rfl: 11  •  ONE TOUCH ULTRA TEST test strip, TEST BLOOD SUGAR THREE TIMES DAILY AS INSTRUCTED., Disp: 100 each, Rfl: 11  •  ONETOUCH DELICA LANCETS FINE [facility], USE THREE TIMES DAILY TO TEST, Disp: 100 each, Rfl: 3  •  promethazine-codeine (PHENERGAN with CODEINE) 6.25-10 MG/5ML solution, TAKE 5 ML BY MOUTH EVERY 6 HOURS AS NEEDED FOR COUGH, Disp: 240 mL, Rfl: 0  •  pseudoephedrine (Sudafed) 30 MG tablet, Take 1 tablet by mouth Every 4 (Four) Hours As Needed for Congestion., Disp: 30 tablet, Rfl: 1  •  raNITIdine (ZANTAC) 150 MG tablet, Take 1 tablet by mouth 2 (Two) Times a Day., Disp: 180 tablet, Rfl: 3  •  Semaglutide,0.25 or 0.5MG/DOS, (OZEMPIC, 0.25 OR 0.5 MG/DOSE,) 2 MG/1.5ML solution pen-injector, Inject 0.5 mg under the skin into the appropriate area as directed 1 (One) Time Per Week. 0.5 mg weekly, Disp: 1 pen, Rfl: 11  •  sucralfate (CARAFATE) 1 g tablet, Take 1 tablet by mouth 4 (Four) Times a Day., Disp: 120 tablet, Rfl: 11    Review of Systems    Review of Systems   Constitutional: Positive for fatigue and unexpected  weight change. Negative for activity change, appetite change, chills, diaphoresis and fever.   HENT: Negative for congestion, dental problem, drooling, ear discharge, ear pain, facial swelling, mouth sores, postnasal drip, rhinorrhea, sinus pressure, sore throat, tinnitus, trouble swallowing and voice change.    Eyes: Negative for photophobia, pain, discharge, redness, itching and visual disturbance.   Respiratory: Negative for apnea, cough, choking, chest tightness, shortness of breath, wheezing and stridor.    Cardiovascular: Negative for chest pain, palpitations and leg swelling.   Gastrointestinal: Negative for abdominal distention, abdominal pain, constipation, diarrhea, nausea and vomiting.   Endocrine: Positive for polydipsia, polyphagia and polyuria. Negative for cold intolerance and heat intolerance.   Genitourinary: Negative for decreased urine volume, difficulty urinating, dysuria, flank pain, frequency, hematuria and urgency.   Musculoskeletal: Negative for arthralgias, back pain, gait problem, joint swelling, myalgias, neck pain and neck stiffness.   Skin: Negative for color change, pallor, rash and wound.   Allergic/Immunologic: Negative for immunocompromised state.   Neurological: Positive for weakness. Negative for dizziness, tremors, seizures, syncope, facial asymmetry, speech difficulty, light-headedness, numbness and headaches.   Hematological: Negative for adenopathy.   Psychiatric/Behavioral: Negative for agitation, behavioral problems, confusion, decreased concentration, dysphoric mood, hallucinations, self-injury, sleep disturbance and suicidal ideas. The patient is not nervous/anxious and is not hyperactive.         Objective:   There were no vitals taken for this visit.    Physical Exam   Constitutional: He is oriented to person, place, and time.   Neurological: He is alert and oriented to person, place, and time.   Psychiatric: He has a normal mood and affect. His speech is normal and  behavior is normal. Judgment and thought content normal. Cognition and memory are normal.       Lab Review    Results for orders placed or performed in visit on 11/22/19   POC Glucose Fingerstick   Result Value Ref Range    Glucose 120 70 - 130 mg/dL         Assessment/Plan       ICD-10-CM ICD-9-CM   1. Type 2 diabetes mellitus with complication (CMS/[facility]) E11.8 250.90   2. Acquired hypothyroidism E03.9 244.9   3. Hypertension associated with diabetes (CMS/[facility]) E11.59 250.80    I10 401.9   4. Mixed diabetic hyperlipidemia associated with type 2 diabetes mellitus (CMS/[facility]) E11.69 250.80    E78.2 272.2   5. Vitamin D deficiency E55.9 268.9         Glycemic Management:   Lab Results   Component Value Date    HGBA1C 6.8 10/25/2019    HGBA1C 6.94 (H) 06/04/2019    HGBA1C 6.9 (H) 10/17/2018     Lab Results   Component Value Date    GLUCOSE 123 (H) 11/22/2019    BUN 10 11/22/2019    CREATININE 0.91 11/22/2019    EGFRIFNONA 84 11/22/2019    BCR 11.0 11/22/2019    K 4.4 11/22/2019    CO2 28.2 11/22/2019    CALCIUM 10.1 11/22/2019    ALBUMIN 4.60 11/22/2019    AST 12 11/22/2019    ALT 19 11/22/2019    ANIONGAP 15.8 (H) 11/22/2019     Lab Results   Component Value Date    WBC 8.31 11/22/2019    HGB 15.5 11/22/2019    HCT 47.1 11/22/2019    MCV 82.8 11/22/2019     11/22/2019       Stop mixed insulin     Start ozempic 0.25 mg weekly for 4 weeks then 0.5 mg weekly --stopped due to Nausea, diarrhea         Diarrhea on metformin     Tresiba 40 units at night-35 units     Now Taking Lantus- 12-14     Humalog 15-22 units with meals       Try to eat 60 to 75 grams of carbohydrate per meal     Now using Britta sensors     Well controlled, compliant with medication.     Please complete ordered lab work, I will call with results       Lipid Management  Lab Results   Component Value Date    CHOL 132 11/22/2019    CHOL 178 10/17/2018    CHOL 259 (H) 10/09/2018     Lab Results   Component Value Date    TRIG 191 (H) 11/22/2019    TRIG  159 10/17/2018    TRIG 686 (H) 10/09/2018     Lab Results   Component Value Date    HDL 36 (L) 11/22/2019    HDL 33 (L) 10/17/2018    HDL 28 (L) 10/09/2018     No components found for: LDLCALC  Lab Results   Component Value Date    LDL 58 11/22/2019     (H) 10/17/2018     (H) 10/09/2018     No results found for: LDLDIRECT    Pravastatin , has CAD -- change to lipitor 40 mg qhs     Blood Pressure Management:    There were no vitals filed for this visit.  Lab Results   Component Value Date    GLUCOSE 123 (H) 11/22/2019    CALCIUM 10.1 11/22/2019     11/22/2019    K 4.4 11/22/2019    CO2 28.2 11/22/2019    CL 99 11/22/2019    BUN 10 11/22/2019    CREATININE 0.91 11/22/2019    EGFRIFNONA 84 11/22/2019    BCR 11.0 11/22/2019    ANIONGAP 15.8 (H) 11/22/2019       Controlled         Microvascular Complication Monitoring:      Eye Exam Evaluation  Pending   -----------    Last Microalbumin-Proteinuria Assessment    No results found for: MALBCRERATIO    No results found for: UTPCR    -----------      Neuropathy  yes     Weight Related:   Wt Readings from Last 3 Encounters:   12/20/19 121 kg (266 lb 14.4 oz)   11/22/19 119 kg (263 lb)   10/25/19 120 kg (264 lb)     There is no height or weight on file to calculate BMI.        Diet interventions: moderate (500 kCal/d) deficit diet.      Bone Health    Lab Results   Component Value Date    CALCIUM 10.1 11/22/2019    [id number] 23.3 (L) 11/22/2019     Start taking Vit D    Recently bought otc Vit d3    Thyroid Health    Lab Results   Component Value Date    TSH 0.352 11/22/2019    TSH 4.260 (H) 05/29/2019    TSH 6.430 (H) 04/02/2019       On 150 daily --    Continue on current medication     We will continue to monitor     Complete ordered lab work       Other Diabetes Related Aspects       Lab Results   Component Value Date    [id number] 332 09/23/2014           Last celiac panel     No results found for: GDPABIGA, TTRANSGLIGA, IGA, [id number]      Orders  Placed This Encounter   Procedures   • TSH     Standing Status:   Future     Standing Expiration Date:   5/29/2021   • Vitamin D 25 Hydroxy     Standing Status:   Future     Standing Expiration Date:   5/28/2021   • Comprehensive Metabolic Panel     Standing Status:   Future     Standing Expiration Date:   5/28/2021   • Hemoglobin A1c     Standing Status:   Future     Standing Expiration Date:   5/28/2021   • Vitamin B12     Standing Status:   Future     Standing Expiration Date:   5/28/2021   • Lipid Panel     These lab test should be done fasting. This means do not eat or drink for at least 12 hours prior to getting your blood drawn.     Standing Status:   Future     Standing Expiration Date:   5/28/2021   • CBC & Differential     Standing Status:   Future     Standing Expiration Date:   5/28/2021     Order Specific Question:   Manual Differential     Answer:   No           Return in about 3 months (around 8/28/2020), or if symptoms worsen or fail to improve, for Recheck.        This document has been electronically signed by COLLEEN Kirby on May 28, 2020 09:48            A copy of my note was sent to Chon Nguyễn MD    Please see my above opinion and suggestions.

## 2020-06-22 RX ORDER — LEVOTHYROXINE SODIUM 0.15 MG/1
TABLET ORAL
Qty: 90 TABLET | Refills: 1 | OUTPATIENT
Start: 2020-06-22

## 2020-06-23 ENCOUNTER — OFFICE VISIT (OUTPATIENT)
Dept: FAMILY MEDICINE CLINIC | Facility: CLINIC | Age: 65
End: 2020-06-23

## 2020-06-23 ENCOUNTER — LAB (OUTPATIENT)
Dept: LAB | Facility: HOSPITAL | Age: 65
End: 2020-06-23

## 2020-06-23 VITALS
DIASTOLIC BLOOD PRESSURE: 72 MMHG | HEART RATE: 61 BPM | HEIGHT: 70 IN | WEIGHT: 263.4 LBS | TEMPERATURE: 98.2 F | SYSTOLIC BLOOD PRESSURE: 144 MMHG | OXYGEN SATURATION: 97 % | BODY MASS INDEX: 37.71 KG/M2

## 2020-06-23 DIAGNOSIS — E11.65 TYPE 2 DIABETES MELLITUS WITH HYPERGLYCEMIA, WITH LONG-TERM CURRENT USE OF INSULIN (HCC): ICD-10-CM

## 2020-06-23 DIAGNOSIS — I10 ESSENTIAL HYPERTENSION: ICD-10-CM

## 2020-06-23 DIAGNOSIS — Z79.4 TYPE 2 DIABETES MELLITUS WITH HYPERGLYCEMIA, WITH LONG-TERM CURRENT USE OF INSULIN (HCC): ICD-10-CM

## 2020-06-23 DIAGNOSIS — E03.9 ACQUIRED HYPOTHYROIDISM: Primary | ICD-10-CM

## 2020-06-23 DIAGNOSIS — Z12.5 ENCOUNTER FOR PROSTATE CANCER SCREENING: ICD-10-CM

## 2020-06-23 PROCEDURE — G0103 PSA SCREENING: HCPCS | Performed by: FAMILY MEDICINE

## 2020-06-23 PROCEDURE — 99213 OFFICE O/P EST LOW 20 MIN: CPT | Performed by: FAMILY MEDICINE

## 2020-06-23 PROCEDURE — 84443 ASSAY THYROID STIM HORMONE: CPT | Performed by: FAMILY MEDICINE

## 2020-06-23 PROCEDURE — 83036 HEMOGLOBIN GLYCOSYLATED A1C: CPT | Performed by: FAMILY MEDICINE

## 2020-06-23 RX ORDER — INSULIN ASPART 100 [IU]/ML
INJECTION, SUSPENSION SUBCUTANEOUS
COMMUNITY
Start: 2020-05-08 | End: 2020-07-13 | Stop reason: CLARIF

## 2020-06-23 RX ORDER — FUROSEMIDE 40 MG/1
40 TABLET ORAL EVERY OTHER DAY
COMMUNITY
Start: 2020-05-08 | End: 2022-04-18

## 2020-06-23 RX ORDER — LISINOPRIL 20 MG/1
20 TABLET ORAL DAILY
Qty: 90 TABLET | Refills: 3 | Status: SHIPPED | OUTPATIENT
Start: 2020-06-23

## 2020-06-23 RX ORDER — PRASUGREL 10 MG/1
10 TABLET, FILM COATED ORAL DAILY
COMMUNITY
Start: 2020-05-08 | End: 2021-05-05

## 2020-06-23 RX ORDER — LISINOPRIL 10 MG/1
10 TABLET ORAL 2 TIMES DAILY
COMMUNITY
Start: 2020-06-03 | End: 2020-06-23 | Stop reason: DRUGHIGH

## 2020-06-23 NOTE — PROGRESS NOTES
" Subjective   Pablo Pina is a 64 y.o. male.     History of Present Illness     Cc here for htn, and thyroid check.  I didn't realize he is seeing endocrinology for his thyroid and diabetes.   He is due for lab tests, last psa 2018.  Feels fine, no comlaints.   Has never had colonoscopy.    Review of Systems   Constitutional: Negative for chills, fatigue and fever.   HENT: Negative for congestion, ear discharge, ear pain, facial swelling, hearing loss, postnasal drip, rhinorrhea, sinus pressure, sore throat, trouble swallowing and voice change.    Eyes: Negative for discharge, redness and visual disturbance.   Respiratory: Negative for cough, chest tightness, shortness of breath and wheezing.    Cardiovascular: Negative for chest pain and palpitations.   Gastrointestinal: Negative for abdominal pain, blood in stool, constipation, diarrhea, nausea and vomiting.   Endocrine: Negative for polydipsia and polyuria.   Genitourinary: Negative for dysuria, flank pain, hematuria and urgency.   Musculoskeletal: Negative for arthralgias, back pain, joint swelling and myalgias.   Skin: Negative for rash.   Neurological: Negative for dizziness, weakness, numbness and headaches.   Hematological: Negative for adenopathy.   Psychiatric/Behavioral: Negative for confusion and sleep disturbance. The patient is not nervous/anxious.            /72 (BP Location: Left arm, Patient Position: Sitting, Cuff Size: Adult)   Pulse 61   Temp 98.2 °F (36.8 °C) (Temporal)   Ht 177.8 cm (70\")   Wt 119 kg (263 lb 6.4 oz)   SpO2 97%   BMI 37.79 kg/m²       Objective     Physical Exam   Constitutional: He is oriented to person, place, and time. He appears well-developed and well-nourished.   HENT:   Head: Normocephalic and atraumatic.   Right Ear: External ear normal.   Left Ear: External ear normal.   Nose: Nose normal.   Eyes: Pupils are equal, round, and reactive to light. Conjunctivae and EOM are normal.   Neck: Normal range of " motion. Neck supple.   Cardiovascular: Normal rate, regular rhythm and normal heart sounds. Exam reveals no gallop and no friction rub.   No murmur heard.  Pulmonary/Chest: Effort normal and breath sounds normal.   Abdominal: Soft. Bowel sounds are normal. He exhibits no distension. There is no tenderness. There is no rebound and no guarding.   Musculoskeletal: Normal range of motion. He exhibits no edema or deformity.   Neurological: He is alert and oriented to person, place, and time. No cranial nerve deficit.   Skin: Skin is warm and dry. No rash noted. No erythema.   Psychiatric: He has a normal mood and affect. His behavior is normal. Judgment and thought content normal.   Nursing note and vitals reviewed.          PAST MEDICAL HISTORY     Past Medical History:   Diagnosis Date   • Acquired hypothyroidism    • Acute bronchitis    • Arm pain     Pain in right arm     • Borderline glaucoma    • Clouded consciousness     wooziness when getting up from laying or sitting position    • Coronary arteriosclerosis    • Encounter for immunization    • Essential hypertension    • Fatigue    • Feces, abnormal     Other fecal abnormalities   • Gastroesophageal reflux disease    • Hypercholesterolemia    • Hyperlipidemia    • Hypermetropia    • Hypertensive disorder    • Impacted cerumen    • Impaired glucose tolerance    • Impotence    • Need for immunization against influenza    • Obstructive sleep apnea syndrome    • Overweight    • Patient noncompliance     with other medical treatment and regimen    • Patient noncompliance, general    • Presbyopia    • Sacroiliac joint pain    • Sensorineural hearing loss of both ears    • Skin tag    • Type 2 diabetes mellitus (CMS/HCC)       PAST SURGICAL HISTORY     Past Surgical History:   Procedure Laterality Date   • CARDIAC CATHETERIZATION N/A 10/9/2018    Procedure: Left Heart Cath;  Surgeon: Lit Rodriguez MD;  Location: [facility] CATH INVASIVE LOCATION;  Service: Cardiology   •  CARDIAC CATHETERIZATION N/A 6/18/2019    Procedure: Left Heart Cath;  Surgeon: Lit Rodriguez MD;  Location: [facility] CATH INVASIVE LOCATION;  Service: Cardiology   • CORONARY ARTERY BYPASS GRAFT  01/26/2005    CABG (1) - LIMA to LAD, SVG to D1, SVG to OM,SVG to PDA.   • INJECTION OF MEDICATION  10/01/2015    Kenalog (1) - OBI Nguyễn   • OTHER SURGICAL HISTORY      Coronary artery stent placement (1)   • OTHER SURGICAL HISTORY  06/02/2012    Remove Impacted Cerumen 93324 (1) - OLYA Hammond   • OTHER SURGICAL HISTORY  03/11/2014    Skin Tag Removal (Each Additional 10 97234 (1) - OBI Nguyễn   • OTHER SURGICAL HISTORY  10/01/2015    Nebulizer Treatment 39045 (1) - OBI Nguyễn      SOCIAL HISTORY     Social History     Socioeconomic History   • Marital status:      Spouse name: Not on file   • Number of children: Not on file   • Years of education: Not on file   • Highest education level: Not on file   Tobacco Use   • Smoking status: Former Smoker   • Smokeless tobacco: Never Used   • Tobacco comment: Quit around 2005   Substance and Sexual Activity   • Alcohol use: No   • Drug use: No   • Sexual activity: Never      ALLERGIES   Patient has no known allergies.   MEDICATIONS     Current Outpatient Medications   Medication Sig Dispense Refill   • albuterol sulfate  (90 Base) MCG/ACT inhaler Inhale 2 puffs Every 4 (Four) Hours As Needed for Wheezing. 1 inhaler 11   • Alcohol Swabs 70 % pads 1 each 2 (Two) Times a Day. 100 each 11   • aspirin 81 MG chewable tablet Chew 81 mg Every Night.     • atorvastatin (LIPITOR) 40 MG tablet Take 1 tablet by mouth Daily. 30 tablet 11   • carvedilol (COREG) 3.125 MG tablet Take 1 tablet by mouth 2 (Two) Times a Day With Meals. 60 tablet 11   • cetirizine (zyrTEC) 10 MG tablet Take 1 tablet by mouth Daily. 30 tablet 11   • clopidogrel (PLAVIX) 75 MG tablet Take 75 mg by mouth Daily.     • Continuous Blood Gluc  (FREESTYLE RAJESH 14 DAY READER) device USE AS DIRECTED TO MONITOR  BLOOD SUGAR 1 Device 0   • Continuous Blood Gluc Sensor (FREESTYLE RAJESH 14 DAY SENSOR) misc TEST ONCE DAILY AND CHANGE SENSOR EVERY 14 DAYS. 2 each 11   • fluticasone (FLONASE) 50 MCG/ACT nasal spray 2 sprays into the nostril(s) as directed by provider Daily. 1 bottle 11   • furosemide (LASIX) 20 MG tablet Take 20 mg by mouth as needed.  12   • furosemide (LASIX) 40 MG tablet Take 40 mg by mouth Every Other Day.     • glucose blood test strip      • Insulin Glargine (LANTUS SOLOSTAR) 100 UNIT/ML injection pen Inject 40 Units under the skin into the appropriate area as directed Every Night. 4 pen 11   • Insulin Lispro (HUMALOG KWIKPEN) 200 UNIT/ML solution pen-injector Inject 15 Units under the skin into the appropriate area as directed 3 (Three) Times a Day With Meals. 3 pen 11   • Insulin Pen Needle (B-D UF III MINI PEN NEEDLES) 31G X 5 MM misc Use 4 times daily  , ICD10 code is E11.9 120 each 11   • levothyroxine (SYNTHROID, LEVOTHROID) 150 MCG tablet Take 1 tablet by mouth Daily. (Patient taking differently: Take 150 mcg by mouth Every Night.) 90 tablet 1   • levothyroxine (SYNTHROID, LEVOTHROID) 150 MCG tablet TAKE 1 TABLET BY MOUTH EVERY DAY BEFORE BREAKFAST 90 tablet 0   • nitroglycerin (NITROSTAT) 0.4 MG SL tablet DISSOLVE 1 TABLET BY MOUTH UNDER THE TONGUE AS NEEDED FOR CHEST PAIN, DO NOT EXCEED MORE THAN 3 DOSES IN 15 MINUTES 25 tablet 11   • ONE TOUCH ULTRA TEST test strip TEST BLOOD SUGAR THREE TIMES DAILY AS INSTRUCTED. 100 each 11   • ONETOUCH DELICA LANCETS FINE misc USE THREE TIMES DAILY TO TEST 100 each 3   • prasugrel (EFFIENT) 10 MG tablet Take 10 mg by mouth Daily.     • latanoprost (XALATAN) 0.005 % ophthalmic solution PLACE 1 DROP INTO EACH EYE AT BEDTIME  6   • lisinopril (PRINIVIL,ZESTRIL) 20 MG tablet Take 1 tablet by mouth Daily. 90 tablet 3   • methylPREDNISolone (MEDROL, COLTEN,) 4 MG tablet Take as directed on package instructions. 21 tablet 0   • NOVOLOG MIX 70/30 FLEXPEN (70-30) 100  UNIT/ML suspension pen-injector injection INJECT 90 UNITS UNDER THE SKIN MORNING AND 30 50 UNITS AS NEEDED.     • promethazine-codeine (PHENERGAN with CODEINE) 6.25-10 MG/5ML solution TAKE 5 ML BY MOUTH EVERY 6 HOURS AS NEEDED FOR COUGH 240 mL 0   • pseudoephedrine (Sudafed) 30 MG tablet Take 1 tablet by mouth Every 4 (Four) Hours As Needed for Congestion. 30 tablet 1   • raNITIdine (ZANTAC) 150 MG tablet Take 1 tablet by mouth 2 (Two) Times a Day. 180 tablet 3   • Semaglutide,0.25 or 0.5MG/DOS, (OZEMPIC, 0.25 OR 0.5 MG/DOSE,) 2 MG/1.5ML solution pen-injector Inject 0.5 mg under the skin into the appropriate area as directed 1 (One) Time Per Week. 0.5 mg weekly 1 pen 11   • sucralfate (CARAFATE) 1 g tablet Take 1 tablet by mouth 4 (Four) Times a Day. 120 tablet 11     No current facility-administered medications for this visit.         The following portions of the patient's history were reviewed and updated as appropriate: allergies, current medications, past family history, past medical history, past social history, past surgical history and problem list.        Assessment/Plan   Pablo was seen today for med refill.    Diagnoses and all orders for this visit:    Acquired hypothyroidism  -     TSH    Type 2 diabetes mellitus with hyperglycemia, with long-term current use of insulin (CMS/[facility], [facility])  -     Hemoglobin A1c    Encounter for prostate cancer screening  -     PSA Screen    Essential hypertension    Other orders  -     lisinopril (PRINIVIL,ZESTRIL) 20 MG tablet; Take 1 tablet by mouth Daily.      He may take lisinopril 20mg qd instead of 10mg bid.    Will call with labs  He can follow up 6 months with endocrinology.      Stool card given and explained.                      No follow-ups on file.                  This document has been electronically signed by Chon Nguyễn MD on June 23, 2020 11:27

## 2020-06-23 NOTE — PATIENT INSTRUCTIONS

## 2020-06-24 LAB
HBA1C MFR BLD: 7.46 % (ref 4.8–5.6)
PSA SERPL-MCNC: 0.33 NG/ML (ref 0–4)
TSH SERPL DL<=0.05 MIU/L-ACNC: 0.46 UIU/ML (ref 0.27–4.2)

## 2020-06-25 RX ORDER — LEVOTHYROXINE SODIUM 0.15 MG/1
150 TABLET ORAL NIGHTLY
Qty: 90 TABLET | Refills: 1 | Status: SHIPPED | OUTPATIENT
Start: 2020-06-25 | End: 2020-07-13 | Stop reason: CLARIF

## 2020-06-25 RX ORDER — LEVOTHYROXINE SODIUM 0.15 MG/1
TABLET ORAL
Qty: 90 TABLET | Refills: 1 | Status: SHIPPED | OUTPATIENT
Start: 2020-06-25 | End: 2020-07-13 | Stop reason: CLARIF

## 2020-07-02 RX ORDER — LEVOTHYROXINE SODIUM 0.15 MG/1
150 TABLET ORAL
Qty: 90 TABLET | Refills: 1 | Status: SHIPPED | OUTPATIENT
Start: 2020-07-02 | End: 2020-12-30

## 2020-07-13 DIAGNOSIS — E11.59 HYPERTENSION ASSOCIATED WITH DIABETES (HCC): ICD-10-CM

## 2020-07-13 DIAGNOSIS — E11.69 MIXED DIABETIC HYPERLIPIDEMIA ASSOCIATED WITH TYPE 2 DIABETES MELLITUS (HCC): ICD-10-CM

## 2020-07-13 DIAGNOSIS — E55.9 VITAMIN D DEFICIENCY: ICD-10-CM

## 2020-07-13 DIAGNOSIS — I15.2 HYPERTENSION ASSOCIATED WITH DIABETES (HCC): ICD-10-CM

## 2020-07-13 DIAGNOSIS — E11.8 TYPE 2 DIABETES MELLITUS WITH COMPLICATION (HCC): ICD-10-CM

## 2020-07-13 DIAGNOSIS — E78.2 MIXED DIABETIC HYPERLIPIDEMIA ASSOCIATED WITH TYPE 2 DIABETES MELLITUS (HCC): ICD-10-CM

## 2020-07-22 DIAGNOSIS — E11.8 TYPE 2 DIABETES MELLITUS WITH COMPLICATION (HCC): ICD-10-CM

## 2020-07-22 RX ORDER — INSULIN GLARGINE 100 [IU]/ML
INJECTION, SOLUTION SUBCUTANEOUS
Qty: 12 ML | Refills: 3 | Status: SHIPPED | OUTPATIENT
Start: 2020-07-22 | End: 2020-12-15

## 2020-08-03 RX ORDER — INSULIN ASPART 100 [IU]/ML
INJECTION, SUSPENSION SUBCUTANEOUS
Qty: 48 ML | Refills: 1 | Status: SHIPPED | OUTPATIENT
Start: 2020-08-03 | End: 2020-12-15

## 2020-09-09 ENCOUNTER — OFFICE VISIT (OUTPATIENT)
Dept: ENDOCRINOLOGY | Facility: CLINIC | Age: 65
End: 2020-09-09

## 2020-09-09 VITALS
DIASTOLIC BLOOD PRESSURE: 64 MMHG | WEIGHT: 261.2 LBS | HEIGHT: 70 IN | BODY MASS INDEX: 37.39 KG/M2 | HEART RATE: 60 BPM | SYSTOLIC BLOOD PRESSURE: 120 MMHG | OXYGEN SATURATION: 98 %

## 2020-09-09 DIAGNOSIS — E11.8 TYPE 2 DIABETES MELLITUS WITH COMPLICATION (HCC): Primary | ICD-10-CM

## 2020-09-09 DIAGNOSIS — E03.9 ACQUIRED HYPOTHYROIDISM: ICD-10-CM

## 2020-09-09 DIAGNOSIS — E11.69 MIXED DIABETIC HYPERLIPIDEMIA ASSOCIATED WITH TYPE 2 DIABETES MELLITUS (HCC): ICD-10-CM

## 2020-09-09 DIAGNOSIS — E11.59 HYPERTENSION ASSOCIATED WITH DIABETES (HCC): ICD-10-CM

## 2020-09-09 DIAGNOSIS — E78.2 MIXED DIABETIC HYPERLIPIDEMIA ASSOCIATED WITH TYPE 2 DIABETES MELLITUS (HCC): ICD-10-CM

## 2020-09-09 DIAGNOSIS — I15.2 HYPERTENSION ASSOCIATED WITH DIABETES (HCC): ICD-10-CM

## 2020-09-09 DIAGNOSIS — E55.9 VITAMIN D DEFICIENCY: ICD-10-CM

## 2020-09-09 PROCEDURE — 99214 OFFICE O/P EST MOD 30 MIN: CPT | Performed by: NURSE PRACTITIONER

## 2020-09-09 NOTE — PROGRESS NOTES
"Pablo Pina is a 64 y.o. male who presents for follow up     Chief Complaint   Patient presents with   • Follow-up   • Diabetes       Referring provider       Primary Care Provider    Chon Nguyễn MD    Duration 10 years    Timing - Diabetes is Constant    Quality -  Improving     Severity -  severe    Complications - peripheral neuropathy    Current symptoms/problems  increase appetite, polydipsia and polyuria     Alleviating Factors: Compliance       Side Effects  hypoglycemia    Current diet  in general, a \"healthy\" diet      Current exercise none    Current monitoring regimen: home blood tests - checking 4 x daily     Home blood sugar records:     Hypoglycemia unawareness and nocturnal , none since last visit     Patient states that his blood sugar has been up and down, eats at different times each day, stopped taking Lantus a few weeks ago     Past Medical History:   Diagnosis Date   • Acquired hypothyroidism    • Acute bronchitis    • Arm pain     Pain in right arm     • Borderline glaucoma    • Clouded consciousness     wooziness when getting up from laying or sitting position    • Coronary arteriosclerosis    • Encounter for immunization    • Essential hypertension    • Fatigue    • Feces, abnormal     Other fecal abnormalities   • Gastroesophageal reflux disease    • Hypercholesterolemia    • Hyperlipidemia    • Hypermetropia    • Hypertensive disorder    • Impacted cerumen    • Impaired glucose tolerance    • Impotence    • Need for immunization against influenza    • Obstructive sleep apnea syndrome    • Overweight    • Patient noncompliance     with other medical treatment and regimen    • Patient noncompliance, general    • Presbyopia    • Sacroiliac joint pain    • Sensorineural hearing loss of both ears    • Skin tag    • Type 2 diabetes mellitus (CMS/HCC)      Family History   Problem Relation Age of Onset   • Heart attack Brother 46     Social History     Tobacco Use   • Smoking " status: Former Smoker   • Smokeless tobacco: Never Used   • Tobacco comment: Quit around 2005   Substance Use Topics   • Alcohol use: No   • Drug use: No         Current Outpatient Medications:   •  albuterol sulfate  (90 Base) MCG/ACT inhaler, Inhale 2 puffs Every 4 (Four) Hours As Needed for Wheezing., Disp: 1 inhaler, Rfl: 11  •  Alcohol Swabs 70 % pads, 1 each 2 (Two) Times a Day., Disp: 100 each, Rfl: 11  •  aspirin 81 MG chewable tablet, Chew 81 mg Every Night., Disp: , Rfl:   •  atorvastatin (LIPITOR) 40 MG tablet, Take 1 tablet by mouth Daily., Disp: 30 tablet, Rfl: 11  •  carvedilol (COREG) 3.125 MG tablet, Take 1 tablet by mouth 2 (Two) Times a Day With Meals., Disp: 60 tablet, Rfl: 11  •  cetirizine (zyrTEC) 10 MG tablet, Take 1 tablet by mouth Daily., Disp: 30 tablet, Rfl: 11  •  clopidogrel (PLAVIX) 75 MG tablet, Take 75 mg by mouth Daily., Disp: , Rfl:   •  Continuous Blood Gluc  (FREESTYLE RAJESH 14 DAY READER) device, USE AS DIRECTED TO MONITOR BLOOD SUGAR, Disp: 1 Device, Rfl: 0  •  Continuous Blood Gluc Sensor (FREESTYLE RAJESH 14 DAY SENSOR) mis, TEST ONCE DAILY AND CHANGE SENSOR EVERY 14 DAYS., Disp: 2 each, Rfl: 11  •  fluticasone (FLONASE) 50 MCG/ACT nasal spray, 2 sprays into the nostril(s) as directed by provider Daily., Disp: 1 bottle, Rfl: 11  •  furosemide (LASIX) 20 MG tablet, Take 20 mg by mouth as needed., Disp: , Rfl: 12  •  furosemide (LASIX) 40 MG tablet, Take 40 mg by mouth Every Other Day., Disp: , Rfl:   •  glucose blood test strip, , Disp: , Rfl:   •  Insulin Lispro (HumaLOG KwikPen) 200 UNIT/ML solution pen-injector, Inject 30 Units under the skin into the appropriate area as directed 3 (Three) Times a Day With Meals. Inject up to 30 units with meals, Disp: 2 pen, Rfl: 11  •  Insulin Pen Needle (B-D UF III MINI PEN NEEDLES) 31G X 5 MM misc, Use 4 times daily  , ICD10 code is E11.9, Disp: 120 each, Rfl: 11  •  LANTUS SOLOSTAR 100 UNIT/ML injection pen, INJECT 40  UNITS UNDER THE SKIN EVERY NIGHT AT BEDTIME, Disp: 12 mL, Rfl: 3  •  latanoprost (XALATAN) 0.005 % ophthalmic solution, PLACE 1 DROP INTO EACH EYE AT BEDTIME, Disp: , Rfl: 6  •  levothyroxine (SYNTHROID, LEVOTHROID) 150 MCG tablet, Take 1 tablet by mouth Every Morning Before Breakfast., Disp: 90 tablet, Rfl: 1  •  lisinopril (PRINIVIL,ZESTRIL) 20 MG tablet, Take 1 tablet by mouth Daily., Disp: 90 tablet, Rfl: 3  •  methylPREDNISolone (MEDROL, COLTEN,) 4 MG tablet, Take as directed on package instructions., Disp: 21 tablet, Rfl: 0  •  nitroglycerin (NITROSTAT) 0.4 MG SL tablet, DISSOLVE 1 TABLET BY MOUTH UNDER THE TONGUE AS NEEDED FOR CHEST PAIN, DO NOT EXCEED MORE THAN 3 DOSES IN 15 MINUTES, Disp: 25 tablet, Rfl: 11  •  NOVOLOG MIX 70/30 FLEXPEN (70-30) 100 UNIT/ML suspension pen-injector injection, INJECT 90 UNITS UNDER THE SKIN MORNING AND 30-50 UNITS AS NEEDED., Disp: 48 mL, Rfl: 1  •  ONE TOUCH ULTRA TEST test strip, TEST BLOOD SUGAR THREE TIMES DAILY AS INSTRUCTED., Disp: 100 each, Rfl: 11  •  ONETOUCH DELICA LANCETS FINE [facility], USE THREE TIMES DAILY TO TEST, Disp: 100 each, Rfl: 3  •  prasugrel (EFFIENT) 10 MG tablet, Take 10 mg by mouth Daily., Disp: , Rfl:   •  promethazine-codeine (PHENERGAN with CODEINE) 6.25-10 MG/5ML solution, TAKE 5 ML BY MOUTH EVERY 6 HOURS AS NEEDED FOR COUGH, Disp: 240 mL, Rfl: 0  •  pseudoephedrine (Sudafed) 30 MG tablet, Take 1 tablet by mouth Every 4 (Four) Hours As Needed for Congestion., Disp: 30 tablet, Rfl: 1  •  raNITIdine (ZANTAC) 150 MG tablet, Take 1 tablet by mouth 2 (Two) Times a Day., Disp: 180 tablet, Rfl: 3  •  Semaglutide,0.25 or 0.5MG/DOS, (OZEMPIC, 0.25 OR 0.5 MG/DOSE,) 2 MG/1.5ML solution pen-injector, Inject 0.5 mg under the skin into the appropriate area as directed 1 (One) Time Per Week. 0.5 mg weekly, Disp: 1 pen, Rfl: 11  •  sucralfate (CARAFATE) 1 g tablet, Take 1 tablet by mouth 4 (Four) Times a Day., Disp: 120 tablet, Rfl: 11    Review of Systems    Review of  "Systems   Constitutional: Positive for fatigue and unexpected weight change. Negative for activity change, appetite change, chills, diaphoresis and fever.   HENT: Negative for congestion, dental problem, drooling, ear discharge, ear pain, facial swelling, mouth sores, postnasal drip, rhinorrhea, sinus pressure, sore throat, tinnitus, trouble swallowing and voice change.    Eyes: Negative for photophobia, pain, discharge, redness, itching and visual disturbance.   Respiratory: Negative for apnea, cough, choking, chest tightness, shortness of breath, wheezing and stridor.    Cardiovascular: Negative for chest pain, palpitations and leg swelling.   Gastrointestinal: Negative for abdominal distention, abdominal pain, constipation, diarrhea, nausea and vomiting.   Endocrine: Positive for polydipsia, polyphagia and polyuria. Negative for cold intolerance and heat intolerance.   Genitourinary: Negative for decreased urine volume, difficulty urinating, dysuria, flank pain, frequency, hematuria and urgency.   Musculoskeletal: Negative for arthralgias, back pain, gait problem, joint swelling, myalgias, neck pain and neck stiffness.   Skin: Negative for color change, pallor, rash and wound.   Allergic/Immunologic: Negative for immunocompromised state.   Neurological: Positive for weakness. Negative for dizziness, tremors, seizures, syncope, facial asymmetry, speech difficulty, light-headedness, numbness and headaches.   Hematological: Negative for adenopathy.   Psychiatric/Behavioral: Negative for agitation, behavioral problems, confusion, decreased concentration, dysphoric mood, hallucinations, self-injury, sleep disturbance and suicidal ideas. The patient is not nervous/anxious and is not hyperactive.         Objective:   /64   Pulse 60   Ht 177.8 cm (70\")   Wt 118 kg (261 lb 3.2 oz)   SpO2 98%   BMI 37.48 kg/m²     Physical Exam   Constitutional: He is oriented to person, place, and time.   Neurological: He is " alert and oriented to person, place, and time.   Psychiatric: He has a normal mood and affect. His speech is normal and behavior is normal. Judgment and thought content normal. Cognition and memory are normal.       Lab Review    Results for orders placed or performed in visit on 06/23/20   TSH   Result Value Ref Range    TSH 0.461 0.270 - 4.200 uIU/mL   PSA Screen   Result Value Ref Range    PSA 0.332 0.000 - 4.000 ng/mL   Hemoglobin A1c   Result Value Ref Range    Hemoglobin A1C 7.46 (H) 4.80 - 5.60 %         Assessment/Plan       ICD-10-CM ICD-9-CM   1. Type 2 diabetes mellitus with complication (CMS/[facility]) E11.8 250.90   2. Hypertension associated with diabetes (CMS/[facility]) E11.59 250.80    I10 401.9   3. Mixed diabetic hyperlipidemia associated with type 2 diabetes mellitus (CMS/[facility]) E11.69 250.80    E78.2 272.2   4. Vitamin D deficiency E55.9 268.9   5. Acquired hypothyroidism E03.9 244.9         Glycemic Management:   Lab Results   Component Value Date    HGBA1C 7.46 (H) 06/23/2020    HGBA1C 6.8 10/25/2019    HGBA1C 6.94 (H) 06/04/2019     Lab Results   Component Value Date    GLUCOSE 123 (H) 11/22/2019    BUN 10 11/22/2019    CREATININE 0.91 11/22/2019    EGFRIFNONA 84 11/22/2019    BCR 11.0 11/22/2019    K 4.4 11/22/2019    CO2 28.2 11/22/2019    CALCIUM 10.1 11/22/2019    ALBUMIN 4.60 11/22/2019    AST 12 11/22/2019    ALT 19 11/22/2019    ANIONGAP 15.8 (H) 11/22/2019     Lab Results   Component Value Date    WBC 8.31 11/22/2019    HGB 15.5 11/22/2019    HCT 47.1 11/22/2019    MCV 82.8 11/22/2019     11/22/2019       Stop mixed insulin     Start ozempic 0.25 mg weekly for 4 weeks then 0.5 mg weekly --stopped due to Nausea, diarrhea         Diarrhea on metformin     Lantus- 12-14 - Scared to take due to blood sugar dropping in the 60s in the morning, so he stopped taking    He will restart- we will start back at 10- feels comfortable taking 10     Humalog 20-30 units with meals       Try to eat 60 to 75  grams of carbohydrate per meal     No longer wearing Britta- financial issues     Blood sugar- 204     Lipid Management  Lab Results   Component Value Date    CHOL 132 11/22/2019    CHOL 178 10/17/2018    CHOL 259 (H) 10/09/2018     Lab Results   Component Value Date    TRIG 191 (H) 11/22/2019    TRIG 159 10/17/2018    TRIG 686 (H) 10/09/2018     Lab Results   Component Value Date    HDL 36 (L) 11/22/2019    HDL 33 (L) 10/17/2018    HDL 28 (L) 10/09/2018     No components found for: LDLCALC  Lab Results   Component Value Date    LDL 58 11/22/2019     (H) 10/17/2018     (H) 10/09/2018     No results found for: LDLDIRECT    Pravastatin , has CAD -- change to lipitor 40 mg qhs     Blood Pressure Management:    Vitals:    09/09/20 0815   BP: 120/64   Pulse: 60   SpO2: 98%     Lab Results   Component Value Date    GLUCOSE 123 (H) 11/22/2019    CALCIUM 10.1 11/22/2019     11/22/2019    K 4.4 11/22/2019    CO2 28.2 11/22/2019    CL 99 11/22/2019    BUN 10 11/22/2019    CREATININE 0.91 11/22/2019    EGFRIFNONA 84 11/22/2019    BCR 11.0 11/22/2019    ANIONGAP 15.8 (H) 11/22/2019       Controlled         Microvascular Complication Monitoring:      Eye Exam Evaluation  Pending   -----------    Last Microalbumin-Proteinuria Assessment    No results found for: MALBCRERATIO    No results found for: UTPCR    -----------      Neuropathy  yes     Weight Related:   Wt Readings from Last 3 Encounters:   09/09/20 118 kg (261 lb 3.2 oz)   06/23/20 119 kg (263 lb 6.4 oz)   12/20/19 121 kg (266 lb 14.4 oz)     Body mass index is 37.48 kg/m².        Diet interventions: moderate (500 kCal/d) deficit diet.      Bone Health    Lab Results   Component Value Date    CALCIUM 10.1 11/22/2019    [id number] 23.3 (L) 11/22/2019      otc Vit d3    Thyroid Health    Lab Results   Component Value Date    TSH 0.461 06/23/2020    TSH 0.352 11/22/2019    TSH 4.260 (H) 05/29/2019       Levothyroxine 150 daily -- Continue     We will  continue to monitor       Other Diabetes Related Aspects       Lab Results   Component Value Date    [id number] 332 09/23/2014             Return in about 4 months (around 1/9/2021), or if symptoms worsen or fail to improve, for Recheck.        This document has been electronically signed by COLLEEN Kirby on September 9, 2020 11:14            A copy of my note was sent to Chon Nguyễn MD    Please see my above opinion and suggestions.

## 2020-10-01 ENCOUNTER — FLU SHOT (OUTPATIENT)
Dept: FAMILY MEDICINE CLINIC | Facility: CLINIC | Age: 65
End: 2020-10-01

## 2020-10-01 DIAGNOSIS — Z23 NEED FOR INFLUENZA VACCINATION: ICD-10-CM

## 2020-10-01 PROCEDURE — 90471 IMMUNIZATION ADMIN: CPT | Performed by: NURSE PRACTITIONER

## 2020-10-01 PROCEDURE — 90686 IIV4 VACC NO PRSV 0.5 ML IM: CPT | Performed by: NURSE PRACTITIONER

## 2020-10-06 RX ORDER — FLASH GLUCOSE SENSOR
KIT MISCELLANEOUS
Qty: 2 EACH | Refills: 11 | Status: SHIPPED | OUTPATIENT
Start: 2020-10-06 | End: 2021-12-02 | Stop reason: SDUPTHER

## 2020-10-11 DIAGNOSIS — E78.2 MIXED DIABETIC HYPERLIPIDEMIA ASSOCIATED WITH TYPE 2 DIABETES MELLITUS (HCC): ICD-10-CM

## 2020-10-11 DIAGNOSIS — E11.69 MIXED DIABETIC HYPERLIPIDEMIA ASSOCIATED WITH TYPE 2 DIABETES MELLITUS (HCC): ICD-10-CM

## 2020-10-11 DIAGNOSIS — E11.59 HYPERTENSION ASSOCIATED WITH DIABETES (HCC): ICD-10-CM

## 2020-10-11 DIAGNOSIS — I15.2 HYPERTENSION ASSOCIATED WITH DIABETES (HCC): ICD-10-CM

## 2020-10-11 DIAGNOSIS — E55.9 VITAMIN D DEFICIENCY: ICD-10-CM

## 2020-10-11 DIAGNOSIS — E11.8 TYPE 2 DIABETES MELLITUS WITH COMPLICATION (HCC): ICD-10-CM

## 2020-10-12 RX ORDER — FLURBIPROFEN SODIUM 0.3 MG/ML
SOLUTION/ DROPS OPHTHALMIC
Qty: 120 EACH | Refills: 11 | Status: SHIPPED | OUTPATIENT
Start: 2020-10-12 | End: 2021-11-03

## 2020-10-16 ENCOUNTER — CLINICAL SUPPORT (OUTPATIENT)
Dept: FAMILY MEDICINE CLINIC | Facility: CLINIC | Age: 65
End: 2020-10-16

## 2020-10-16 PROCEDURE — 90670 PCV13 VACCINE IM: CPT | Performed by: NURSE PRACTITIONER

## 2020-10-16 PROCEDURE — 90471 IMMUNIZATION ADMIN: CPT | Performed by: NURSE PRACTITIONER

## 2020-11-06 RX ORDER — ATORVASTATIN CALCIUM 40 MG/1
TABLET, FILM COATED ORAL
Qty: 90 TABLET | Refills: 1 | Status: SHIPPED | OUTPATIENT
Start: 2020-11-06 | End: 2021-05-10

## 2020-11-07 RX ORDER — CARVEDILOL 3.12 MG/1
TABLET ORAL
Qty: 180 TABLET | Refills: 2 | Status: SHIPPED | OUTPATIENT
Start: 2020-11-07 | End: 2021-11-03

## 2020-11-17 ENCOUNTER — DOCUMENTATION (OUTPATIENT)
Dept: ENDOCRINOLOGY | Facility: CLINIC | Age: 65
End: 2020-11-17

## 2020-12-14 DIAGNOSIS — I15.2 HYPERTENSION ASSOCIATED WITH DIABETES (HCC): ICD-10-CM

## 2020-12-14 DIAGNOSIS — E11.69 MIXED DIABETIC HYPERLIPIDEMIA ASSOCIATED WITH TYPE 2 DIABETES MELLITUS (HCC): ICD-10-CM

## 2020-12-14 DIAGNOSIS — E11.8 TYPE 2 DIABETES MELLITUS WITH COMPLICATION (HCC): ICD-10-CM

## 2020-12-14 DIAGNOSIS — E55.9 VITAMIN D DEFICIENCY: ICD-10-CM

## 2020-12-14 DIAGNOSIS — E11.59 HYPERTENSION ASSOCIATED WITH DIABETES (HCC): ICD-10-CM

## 2020-12-14 DIAGNOSIS — E78.2 MIXED DIABETIC HYPERLIPIDEMIA ASSOCIATED WITH TYPE 2 DIABETES MELLITUS (HCC): ICD-10-CM

## 2020-12-14 RX ORDER — BLOOD SUGAR DIAGNOSTIC
STRIP MISCELLANEOUS
Qty: 100 EACH | Refills: 11 | Status: SHIPPED | OUTPATIENT
Start: 2020-12-14

## 2020-12-15 RX ORDER — INSULIN LISPRO 200 [IU]/ML
INJECTION, SOLUTION SUBCUTANEOUS
Qty: 8 PEN | Refills: 3 | Status: SHIPPED | OUTPATIENT
Start: 2020-12-15 | End: 2021-11-01 | Stop reason: SDUPTHER

## 2020-12-15 RX ORDER — INSULIN GLARGINE 100 [IU]/ML
INJECTION, SOLUTION SUBCUTANEOUS
Qty: 12 PEN | Refills: 3 | Status: SHIPPED | OUTPATIENT
Start: 2020-12-15 | End: 2021-05-05

## 2020-12-30 ENCOUNTER — TELEPHONE (OUTPATIENT)
Dept: FAMILY MEDICINE CLINIC | Facility: CLINIC | Age: 65
End: 2020-12-30

## 2020-12-30 DIAGNOSIS — E03.9 ACQUIRED HYPOTHYROIDISM: Primary | ICD-10-CM

## 2020-12-30 RX ORDER — LEVOTHYROXINE SODIUM 0.15 MG/1
TABLET ORAL
Qty: 30 TABLET | Refills: 0 | Status: SHIPPED | OUTPATIENT
Start: 2020-12-30 | End: 2021-01-25 | Stop reason: SDUPTHER

## 2021-01-06 ENCOUNTER — LAB (OUTPATIENT)
Dept: LAB | Facility: HOSPITAL | Age: 66
End: 2021-01-06

## 2021-01-21 ENCOUNTER — LAB (OUTPATIENT)
Dept: LAB | Facility: HOSPITAL | Age: 66
End: 2021-01-21

## 2021-01-21 DIAGNOSIS — E03.9 ACQUIRED HYPOTHYROIDISM: ICD-10-CM

## 2021-01-21 PROCEDURE — 84443 ASSAY THYROID STIM HORMONE: CPT

## 2021-01-22 LAB — TSH SERPL DL<=0.05 MIU/L-ACNC: 1.23 UIU/ML (ref 0.27–4.2)

## 2021-01-25 RX ORDER — LEVOTHYROXINE SODIUM 0.15 MG/1
150 TABLET ORAL
Qty: 90 TABLET | Refills: 1 | Status: SHIPPED | OUTPATIENT
Start: 2021-01-25 | End: 2021-07-19

## 2021-02-24 ENCOUNTER — IMMUNIZATION (OUTPATIENT)
Dept: VACCINE CLINIC | Facility: HOSPITAL | Age: 66
End: 2021-02-24

## 2021-02-24 PROCEDURE — 0001A: CPT | Performed by: THORACIC SURGERY (CARDIOTHORACIC VASCULAR SURGERY)

## 2021-02-24 PROCEDURE — 91300 HC SARSCOV02 VAC 30MCG/0.3ML IM: CPT | Performed by: THORACIC SURGERY (CARDIOTHORACIC VASCULAR SURGERY)

## 2021-03-02 ENCOUNTER — TELEPHONE (OUTPATIENT)
Dept: FAMILY MEDICINE CLINIC | Facility: CLINIC | Age: 66
End: 2021-03-02

## 2021-03-17 ENCOUNTER — IMMUNIZATION (OUTPATIENT)
Dept: VACCINE CLINIC | Facility: HOSPITAL | Age: 66
End: 2021-03-17

## 2021-03-17 PROCEDURE — 91300 HC SARSCOV02 VAC 30MCG/0.3ML IM: CPT | Performed by: THORACIC SURGERY (CARDIOTHORACIC VASCULAR SURGERY)

## 2021-03-17 PROCEDURE — 0002A: CPT | Performed by: THORACIC SURGERY (CARDIOTHORACIC VASCULAR SURGERY)

## 2021-05-05 ENCOUNTER — LAB (OUTPATIENT)
Dept: LAB | Facility: HOSPITAL | Age: 66
End: 2021-05-05

## 2021-05-05 ENCOUNTER — OFFICE VISIT (OUTPATIENT)
Dept: ENDOCRINOLOGY | Facility: CLINIC | Age: 66
End: 2021-05-05

## 2021-05-05 VITALS
WEIGHT: 266 LBS | HEIGHT: 70 IN | SYSTOLIC BLOOD PRESSURE: 142 MMHG | BODY MASS INDEX: 38.08 KG/M2 | OXYGEN SATURATION: 98 % | HEART RATE: 55 BPM | DIASTOLIC BLOOD PRESSURE: 70 MMHG

## 2021-05-05 DIAGNOSIS — Z79.4 TYPE 2 DIABETES MELLITUS WITH HYPERGLYCEMIA, WITH LONG-TERM CURRENT USE OF INSULIN (HCC): Primary | ICD-10-CM

## 2021-05-05 DIAGNOSIS — E03.9 ACQUIRED HYPOTHYROIDISM: ICD-10-CM

## 2021-05-05 DIAGNOSIS — E78.5 HYPERLIPIDEMIA, UNSPECIFIED HYPERLIPIDEMIA TYPE: ICD-10-CM

## 2021-05-05 DIAGNOSIS — E11.65 TYPE 2 DIABETES MELLITUS WITH HYPERGLYCEMIA, WITH LONG-TERM CURRENT USE OF INSULIN (HCC): Primary | ICD-10-CM

## 2021-05-05 DIAGNOSIS — I10 ESSENTIAL HYPERTENSION: ICD-10-CM

## 2021-05-05 LAB
25(OH)D3 SERPL-MCNC: 31.7 NG/ML (ref 30–100)
ALBUMIN SERPL-MCNC: 4.2 G/DL (ref 3.5–5.2)
ALBUMIN UR-MCNC: 1.2 MG/DL
ALBUMIN/GLOB SERPL: 1.4 G/DL
ALP SERPL-CCNC: 72 U/L (ref 39–117)
ALT SERPL W P-5'-P-CCNC: 23 U/L (ref 1–41)
ANION GAP SERPL CALCULATED.3IONS-SCNC: 9.3 MMOL/L (ref 5–15)
AST SERPL-CCNC: 11 U/L (ref 1–40)
BASOPHILS # BLD AUTO: 0.07 10*3/MM3 (ref 0–0.2)
BASOPHILS NFR BLD AUTO: 0.7 % (ref 0–1.5)
BILIRUB SERPL-MCNC: 0.6 MG/DL (ref 0–1.2)
BUN SERPL-MCNC: 14 MG/DL (ref 8–23)
BUN/CREAT SERPL: 15.9 (ref 7–25)
CALCIUM SPEC-SCNC: 9.2 MG/DL (ref 8.6–10.5)
CHLORIDE SERPL-SCNC: 99 MMOL/L (ref 98–107)
CHOLEST SERPL-MCNC: 155 MG/DL (ref 0–200)
CO2 SERPL-SCNC: 28.7 MMOL/L (ref 22–29)
CREAT SERPL-MCNC: 0.88 MG/DL (ref 0.76–1.27)
CREAT UR-MCNC: 149.2 MG/DL
CREAT UR-MCNC: 149.2 MG/DL
DEPRECATED RDW RBC AUTO: 41.6 FL (ref 37–54)
EOSINOPHIL # BLD AUTO: 0.16 10*3/MM3 (ref 0–0.4)
EOSINOPHIL NFR BLD AUTO: 1.7 % (ref 0.3–6.2)
ERYTHROCYTE [DISTWIDTH] IN BLOOD BY AUTOMATED COUNT: 13.7 % (ref 12.3–15.4)
GFR SERPL CREATININE-BSD FRML MDRD: 87 ML/MIN/1.73
GLOBULIN UR ELPH-MCNC: 2.9 GM/DL
GLUCOSE SERPL-MCNC: 178 MG/DL (ref 65–99)
HBA1C MFR BLD: 8.23 % (ref 4.8–5.6)
HCT VFR BLD AUTO: 45.5 % (ref 37.5–51)
HDLC SERPL-MCNC: 36 MG/DL (ref 40–60)
HGB BLD-MCNC: 15.6 G/DL (ref 13–17.7)
IMM GRANULOCYTES # BLD AUTO: 0.06 10*3/MM3 (ref 0–0.05)
IMM GRANULOCYTES NFR BLD AUTO: 0.6 % (ref 0–0.5)
LDLC SERPL CALC-MCNC: 89 MG/DL (ref 0–100)
LDLC/HDLC SERPL: 2.36 {RATIO}
LYMPHOCYTES # BLD AUTO: 2.14 10*3/MM3 (ref 0.7–3.1)
LYMPHOCYTES NFR BLD AUTO: 22.5 % (ref 19.6–45.3)
MCH RBC QN AUTO: 28.8 PG (ref 26.6–33)
MCHC RBC AUTO-ENTMCNC: 34.3 G/DL (ref 31.5–35.7)
MCV RBC AUTO: 83.9 FL (ref 79–97)
MICROALBUMIN/CREAT UR: 8 MG/G
MONOCYTES # BLD AUTO: 0.67 10*3/MM3 (ref 0.1–0.9)
MONOCYTES NFR BLD AUTO: 7 % (ref 5–12)
NEUTROPHILS NFR BLD AUTO: 6.41 10*3/MM3 (ref 1.7–7)
NEUTROPHILS NFR BLD AUTO: 67.5 % (ref 42.7–76)
NRBC BLD AUTO-RTO: 0 /100 WBC (ref 0–0.2)
PLATELET # BLD AUTO: 252 10*3/MM3 (ref 140–450)
PMV BLD AUTO: 11.8 FL (ref 6–12)
POTASSIUM SERPL-SCNC: 4.3 MMOL/L (ref 3.5–5.2)
PROT SERPL-MCNC: 7.1 G/DL (ref 6–8.5)
PROT UR-MCNC: 10 MG/DL
PROT/CREAT UR: 67 MG/G CREA (ref 0–200)
RBC # BLD AUTO: 5.42 10*6/MM3 (ref 4.14–5.8)
SODIUM SERPL-SCNC: 137 MMOL/L (ref 136–145)
TRIGL SERPL-MCNC: 171 MG/DL (ref 0–150)
TSH SERPL DL<=0.05 MIU/L-ACNC: 6.1 UIU/ML (ref 0.27–4.2)
VIT B12 BLD-MCNC: 389 PG/ML (ref 211–946)
VLDLC SERPL-MCNC: 30 MG/DL (ref 5–40)
WBC # BLD AUTO: 9.51 10*3/MM3 (ref 3.4–10.8)

## 2021-05-05 PROCEDURE — 82043 UR ALBUMIN QUANTITATIVE: CPT | Performed by: NURSE PRACTITIONER

## 2021-05-05 PROCEDURE — 36415 COLL VENOUS BLD VENIPUNCTURE: CPT | Performed by: NURSE PRACTITIONER

## 2021-05-05 PROCEDURE — 95251 CONT GLUC MNTR ANALYSIS I&R: CPT | Performed by: NURSE PRACTITIONER

## 2021-05-05 PROCEDURE — 84443 ASSAY THYROID STIM HORMONE: CPT | Performed by: NURSE PRACTITIONER

## 2021-05-05 PROCEDURE — 82306 VITAMIN D 25 HYDROXY: CPT | Performed by: NURSE PRACTITIONER

## 2021-05-05 PROCEDURE — 99214 OFFICE O/P EST MOD 30 MIN: CPT | Performed by: NURSE PRACTITIONER

## 2021-05-05 PROCEDURE — 82570 ASSAY OF URINE CREATININE: CPT | Performed by: NURSE PRACTITIONER

## 2021-05-05 PROCEDURE — 85025 COMPLETE CBC W/AUTO DIFF WBC: CPT | Performed by: NURSE PRACTITIONER

## 2021-05-05 PROCEDURE — 83036 HEMOGLOBIN GLYCOSYLATED A1C: CPT | Performed by: NURSE PRACTITIONER

## 2021-05-05 PROCEDURE — 80061 LIPID PANEL: CPT | Performed by: NURSE PRACTITIONER

## 2021-05-05 PROCEDURE — 80053 COMPREHEN METABOLIC PANEL: CPT | Performed by: NURSE PRACTITIONER

## 2021-05-05 PROCEDURE — 82607 VITAMIN B-12: CPT | Performed by: NURSE PRACTITIONER

## 2021-05-05 PROCEDURE — 84156 ASSAY OF PROTEIN URINE: CPT | Performed by: NURSE PRACTITIONER

## 2021-05-05 NOTE — PROGRESS NOTES
"Chief Complaint  Diabetes    Subjective          Pablo Pina presents to [facility] ENDOCRINOLOGY  History of Present Illness     In office visit    Primary Care Provider     Chon Nguyễn MD      65-year-old male presents for follow-up    Reason diabetes mellitus type 2    Timing constant    Duration diagnosed in 2010    Quality near controlled    Severity is high    Microvascular complications peripheral neuropathy    Macrovascular complications none    Current symptoms today--none     Lab Results   Component Value Date    HGBA1C 7.46 (H) 06/23/2020          Current diabetes regimen--- mealtime insulin     Current glucose monitoring system          Checks 4 times daily      Britta personal use       Downloaded and reviewed     Dated from April 22 to May 5, 2021     Average BG - 211    Target range 29%     High 49%     Very high -- 22%     Low 0 %     Very low 0 %           Hypoglycemia  has been documented in the past    Current diet regular                Objective   Vital Signs:   /70   Pulse 55   Ht 177.8 cm (70\")   Wt 121 kg (266 lb)   SpO2 98%   BMI 38.17 kg/m²     Physical Exam  Constitutional:       Appearance: Normal appearance.   Cardiovascular:      Rate and Rhythm: Regular rhythm.      Heart sounds: Normal heart sounds.   Pulmonary:      Breath sounds: Normal breath sounds.   Musculoskeletal:         General: Normal range of motion.      Cervical back: Normal range of motion.   Skin:     Coloration: Skin is not pale.   Neurological:      General: No focal deficit present.      Mental Status: He is alert.   Psychiatric:         Mood and Affect: Mood normal.         Thought Content: Thought content normal.         Judgment: Judgment normal.        Result Review :                 Assessment and Plan    Diagnoses and all orders for this visit:    1. Type 2 diabetes mellitus with hyperglycemia, with long-term current use of insulin (CMS/[facility]) (Primary)  -     CBC & " Differential  -     Comprehensive Metabolic Panel  -     Hemoglobin A1c  -     Lipid Panel  -     TSH  -     Vitamin B12  -     Vitamin D 25 Hydroxy  -     Protein / Creatinine Ratio, Urine - Urine, Clean Catch  -     Microalbumin / Creatinine Urine Ratio - Urine, Clean Catch    2. Hyperlipidemia, unspecified hyperlipidemia type  -     CBC & Differential  -     Comprehensive Metabolic Panel  -     Hemoglobin A1c  -     Lipid Panel  -     TSH  -     Vitamin B12  -     Vitamin D 25 Hydroxy  -     Protein / Creatinine Ratio, Urine - Urine, Clean Catch  -     Microalbumin / Creatinine Urine Ratio - Urine, Clean Catch    3. Acquired hypothyroidism  -     CBC & Differential  -     Comprehensive Metabolic Panel  -     Hemoglobin A1c  -     Lipid Panel  -     TSH  -     Vitamin B12  -     Vitamin D 25 Hydroxy  -     Protein / Creatinine Ratio, Urine - Urine, Clean Catch  -     Microalbumin / Creatinine Urine Ratio - Urine, Clean Catch    4. Essential hypertension  -     CBC & Differential  -     Comprehensive Metabolic Panel  -     Hemoglobin A1c  -     Lipid Panel  -     TSH  -     Vitamin B12  -     Vitamin D 25 Hydroxy  -     Protein / Creatinine Ratio, Urine - Urine, Clean Catch  -     Microalbumin / Creatinine Urine Ratio - Urine, Clean Catch    Other orders  -     Insulin Glargine (LANTUS SOLOSTAR) 100 UNIT/ML injection pen; Inject 10 Units under the skin into the appropriate area as directed Daily.  Dispense: 2 pen; Refill: 11           Glycemic management    Diabetes mellitus type 2    Lab Results   Component Value Date    HGBA1C 7.46 (H) 06/23/2020     Britta personal use       Downloaded and reviewed     Dated from April 22 to May 5, 2021     Average BG - 211    Target range 29%     High 49%     Very high -- 22%     Low 0 %     Very low 0 %         Ozempic was stopped due to nausea    Metformin stopped due to diarrhea         Lantus taking 10 units once daily--has not been taking scared of lows     Restart Lantus 6  units every a.m.    Hopefully he will feel more comfortable giving the Lantus in the morning    I did discuss with him that 6 units will not be strong enough and that we will need to eventually titrate up but we will do this slowly for fear of lows            Taking Humalog 20 up to 30 units 3 times a day with meals    Uses his dariel sensor to determine what dose of mealtime insulin to give        Approve omni pod     Approve for  Insulin pump and or Continuous Glucose Sensor      #1  Patient has diabetes mellitus, insulin-dependent.     #2 patient  performs blood glucose testing at least times daily and blood glucose log was brought to office with variability from      #3 patient  is injecting Basal insulin  and Prandial Insulin for a total of at least  4 injections per day and has been doing this for at least 6 months      #4 Patient tests blood sugars at least 4 times daily and makes frequent self-adjustments and patient is injecting insulin at least 4 times daily. pateint has been doing this for more than 6 months . patient  tests frequently due to hypoglycemia and hyperglycemia.      #5 I have personally seen patient within the past 6 months     #6 We plan on seeing the patient  every 2-3 months for continuous adjustment of  diabetes regimen      #7 patient has hypoglycemia with episodes of unawareness.     #8 patient has day-to-day variation in mealtime which confounds the degree of insulin dosing with multiple daily injections.     #9 patient has completed diabetes education program with us.     #10 patient has demonstrated the ability to self monitor her glucose.         #11 Patient is motivated in improving  diabetes control            Lipid Management    Taking Lipitor 40 mg 1 at night       Blood Pressure Management:         Taking coreg 3.125 mg BID         Microvascular Complication Monitoring:       Last eye exam -- Oct. 2020, no DR     Cataract bilateral  -----------     Last  Microalbumin-Proteinuria Assessment    No results found for: MICROALBUR, [id number]        Neuropathy yes  -----------          Weight Related:        Body mass index is 38.17 kg/m².     Diet interventions: moderate (500 kCal/d) deficit diet.        Bone Health     Vitamin D deficiency    Taking vitamin D     Thyroid Health    Hypothyroidism taking levothyroxine 150 mcg daily          Other Diabetes Related Aspects               Lab Results   Component Value Date     [id number] 332 09/23/2014       Will do labs today      Follow Up   Return in about 3 months (around 8/5/2021) for Recheck.  Patient was given instructions and counseling regarding his condition or for health maintenance advice. Please see specific information pulled into the AVS if appropriate.

## 2021-05-06 ENCOUNTER — DOCUMENTATION (OUTPATIENT)
Dept: ENDOCRINOLOGY | Facility: CLINIC | Age: 66
End: 2021-05-06

## 2021-05-10 RX ORDER — ATORVASTATIN CALCIUM 40 MG/1
TABLET, FILM COATED ORAL
Qty: 90 TABLET | Refills: 1 | Status: SHIPPED | OUTPATIENT
Start: 2021-05-10 | End: 2021-11-12

## 2021-05-11 RX ORDER — NITROGLYCERIN 0.4 MG/1
TABLET SUBLINGUAL
Qty: 25 TABLET | Refills: 11 | Status: SHIPPED | OUTPATIENT
Start: 2021-05-11

## 2021-06-03 ENCOUNTER — TELEPHONE (OUTPATIENT)
Dept: ENDOCRINOLOGY | Facility: CLINIC | Age: 66
End: 2021-06-03

## 2021-06-03 NOTE — TELEPHONE ENCOUNTER
Pablo called and had some questions regarding his Omnipod and would like a call back when able.     [phone]

## 2021-06-04 ENCOUNTER — TELEPHONE (OUTPATIENT)
Dept: ENDOCRINOLOGY | Facility: CLINIC | Age: 66
End: 2021-06-04

## 2021-07-19 RX ORDER — LEVOTHYROXINE SODIUM 0.15 MG/1
150 TABLET ORAL
Qty: 90 TABLET | Refills: 1 | Status: SHIPPED | OUTPATIENT
Start: 2021-07-19 | End: 2021-12-02 | Stop reason: SDUPTHER

## 2021-08-23 RX ORDER — CARVEDILOL 3.12 MG/1
TABLET ORAL
Qty: 180 TABLET | Refills: 2 | OUTPATIENT
Start: 2021-08-23

## 2021-08-24 NOTE — TELEPHONE ENCOUNTER
Called and spoke to patient he stated he had a whole bottle left and would make appointment when he was closer to being out

## 2021-09-02 ENCOUNTER — OFFICE VISIT (OUTPATIENT)
Dept: FAMILY MEDICINE CLINIC | Facility: CLINIC | Age: 66
End: 2021-09-02

## 2021-09-02 VITALS
SYSTOLIC BLOOD PRESSURE: 150 MMHG | TEMPERATURE: 97.7 F | DIASTOLIC BLOOD PRESSURE: 90 MMHG | OXYGEN SATURATION: 98 % | HEART RATE: 67 BPM

## 2021-09-02 DIAGNOSIS — R19.7 DIARRHEA, UNSPECIFIED TYPE: ICD-10-CM

## 2021-09-02 DIAGNOSIS — J02.9 SORE THROAT: Primary | ICD-10-CM

## 2021-09-02 DIAGNOSIS — R09.81 SINUS CONGESTION: ICD-10-CM

## 2021-09-02 PROCEDURE — 87635 SARS-COV-2 COVID-19 AMP PRB: CPT | Performed by: FAMILY MEDICINE

## 2021-09-02 PROCEDURE — 99213 OFFICE O/P EST LOW 20 MIN: CPT | Performed by: FAMILY MEDICINE

## 2021-09-02 RX ORDER — TRIAMCINOLONE ACETONIDE 40 MG/ML
80 INJECTION, SUSPENSION INTRA-ARTICULAR; INTRAMUSCULAR ONCE
Status: DISCONTINUED | OUTPATIENT
Start: 2021-09-02 | End: 2022-04-18

## 2021-09-02 NOTE — PROGRESS NOTES
Subjective   Pablo Pina is a 65 y.o. male.     Chief Complaint   Patient presents with   • Sore Throat   • Headache   • Cough             History of Present Illness     3 days ago, diarrhea.  Next day ok.  Started congestion, runny nose and behind eyes like a headache. Like sinus headache.  mucinex and dayquil helped.  Can still taste and  Smell.  Wants a steroid shot    Review of Systems   Constitutional: Negative for chills, fatigue and fever.   HENT: Positive for congestion. Negative for ear discharge, ear pain, facial swelling, hearing loss, postnasal drip, rhinorrhea, sinus pressure, sore throat, trouble swallowing and voice change.    Eyes: Negative for discharge, redness and visual disturbance.   Respiratory: Negative for cough, chest tightness, shortness of breath and wheezing.    Cardiovascular: Negative for chest pain and palpitations.   Gastrointestinal: Positive for diarrhea. Negative for abdominal pain, blood in stool, constipation, nausea and vomiting.   Endocrine: Negative for polydipsia and polyuria.   Genitourinary: Negative for dysuria, flank pain, hematuria and urgency.   Musculoskeletal: Negative for arthralgias, back pain, joint swelling and myalgias.   Skin: Negative for rash.   Neurological: Positive for headaches. Negative for dizziness, weakness and numbness.   Hematological: Negative for adenopathy.   Psychiatric/Behavioral: Negative for confusion and sleep disturbance. The patient is not nervous/anxious.            /90 (BP Location: Left arm, Patient Position: Sitting, Cuff Size: Adult)   Pulse 67   Temp 97.7 °F (36.5 °C) (Temporal)   SpO2 98%       Objective     Physical Exam  Vitals and nursing note reviewed.   Constitutional:       Appearance: Normal appearance. He is well-developed.   HENT:      Head: Normocephalic and atraumatic.      Right Ear: External ear normal.      Left Ear: External ear normal.      Nose: Nose normal. No rhinorrhea.   Eyes:      General: No  scleral icterus.     Extraocular Movements: Extraocular movements intact.      Conjunctiva/sclera: Conjunctivae normal.      Pupils: Pupils are equal, round, and reactive to light.   Cardiovascular:      Rate and Rhythm: Normal rate and regular rhythm.      Heart sounds: Normal heart sounds. No friction rub. No gallop.    Pulmonary:      Effort: Pulmonary effort is normal.      Breath sounds: Normal breath sounds.   Abdominal:      General: Bowel sounds are normal. There is no distension.      Palpations: Abdomen is soft.      Tenderness: There is no abdominal tenderness.   Musculoskeletal:         General: No deformity. Normal range of motion.      Cervical back: Normal range of motion and neck supple.   Skin:     General: Skin is warm and dry.      Findings: No erythema or rash.   Neurological:      Mental Status: He is alert and oriented to person, place, and time.      Cranial Nerves: No cranial nerve deficit.   Psychiatric:         Behavior: Behavior normal.         Thought Content: Thought content normal.         Judgment: Judgment normal.             PAST MEDICAL HISTORY     Past Medical History:   Diagnosis Date   • Acquired hypothyroidism    • Acute bronchitis    • Arm pain     Pain in right arm     • Borderline glaucoma    • Clouded consciousness     wooziness when getting up from laying or sitting position    • Coronary arteriosclerosis    • Encounter for immunization    • Essential hypertension    • Fatigue    • Feces, abnormal     Other fecal abnormalities   • Gastroesophageal reflux disease    • Hypercholesterolemia    • Hyperlipidemia    • Hypermetropia    • Hypertensive disorder    • Impacted cerumen    • Impaired glucose tolerance    • Impotence    • Need for immunization against influenza    • Obstructive sleep apnea syndrome    • Overweight    • Patient noncompliance     with other medical treatment and regimen    • Patient noncompliance, general    • Presbyopia    • Sacroiliac joint pain    •  Sensorineural hearing loss of both ears    • Skin tag    • Type 2 diabetes mellitus (CMS/[facility])       PAST SURGICAL HISTORY     Past Surgical History:   Procedure Laterality Date   • CARDIAC CATHETERIZATION N/A 10/9/2018    Procedure: Left Heart Cath;  Surgeon: Lit Rodriguez MD;  Location: [facility] CATH INVASIVE LOCATION;  Service: Cardiology   • CARDIAC CATHETERIZATION N/A 6/18/2019    Procedure: Left Heart Cath;  Surgeon: Lit Rodriguez MD;  Location: [facility] CATH INVASIVE LOCATION;  Service: Cardiology   • CORONARY ARTERY BYPASS GRAFT  01/26/2005    CABG (1) - LIMA to LAD, SVG to D1, SVG to OM,SVG to PDA.   • INJECTION OF MEDICATION  10/01/2015    Kenalog (1) - OBI Nguyễn   • OTHER SURGICAL HISTORY      Coronary artery stent placement (1)   • OTHER SURGICAL HISTORY  06/02/2012    Remove Impacted Cerumen 58677 (1) - OLYA Hammond   • OTHER SURGICAL HISTORY  03/11/2014    Skin Tag Removal (Each Additional 10 62839 (1) - OBI Nguyễn   • OTHER SURGICAL HISTORY  10/01/2015    Nebulizer Treatment 05928 (1) - OBI Nguyễn      SOCIAL HISTORY     Social History     Socioeconomic History   • Marital status:      Spouse name: Not on file   • Number of children: Not on file   • Years of education: Not on file   • Highest education level: Not on file   Tobacco Use   • Smoking status: Former Smoker   • Smokeless tobacco: Never Used   • Tobacco comment: Quit around 2005   Substance and Sexual Activity   • Alcohol use: No   • Drug use: No   • Sexual activity: Never      ALLERGIES   Patient has no known allergies.   MEDICATIONS     Current Outpatient Medications   Medication Sig Dispense Refill   • albuterol sulfate  (90 Base) MCG/ACT inhaler Inhale 2 puffs Every 4 (Four) Hours As Needed for Wheezing. 1 inhaler 11   • Alcohol Swabs 70 % pads 1 each 2 (Two) Times a Day. 100 each 11   • aspirin 81 MG chewable tablet Chew 81 mg Every Night.     • atorvastatin (LIPITOR) 40 MG tablet TAKE 1 TABLET BY MOUTH EVERY DAY 90 tablet 1   •  carvedilol (COREG) 3.125 MG tablet TAKE 1 TABLET BY MOUTH TWICE DAILY WITH MEALS. 180 tablet 2   • cetirizine (zyrTEC) 10 MG tablet Take 1 tablet by mouth Daily. 30 tablet 11   • clopidogrel (PLAVIX) 75 MG tablet Take 75 mg by mouth Daily.     • Continuous Blood Gluc  (FreeStyle Britta 2 Jolo) device 1 each Take As Directed. 28 each 11   • Continuous Blood Gluc Sensor (FreeStyle Britta 14 Day Sensor) misc TEST ONCE DAILY AND CHANGE SENSOR EVERY 14 DAYS 2 each 11   • Continuous Blood Gluc Sensor (FreeStyle Britta 2 Sensor) misc 1 each Take As Directed. 28 each 11   • fluticasone (FLONASE) 50 MCG/ACT nasal spray 2 sprays into the nostril(s) as directed by provider Daily. 1 bottle 11   • furosemide (LASIX) 40 MG tablet Take 40 mg by mouth Every Other Day.     • glucose blood test strip      • HumaLOG KwikPen 200 UNIT/ML solution pen-injector INJECT 15 UNITS UNDER THE SKIN INTO THE APPROPRIATE AREA AS DIRECTED THREE TIMES DAILY. 8 pen 3   • Insulin Glargine (LANTUS SOLOSTAR) 100 UNIT/ML injection pen Inject 10 Units under the skin into the appropriate area as directed Daily. 2 pen 11   • Insulin Pen Needle (B-D UF III MINI PEN NEEDLES) 31G X 5 MM misc USE FOUR TIMES DAILY 120 each 11   • latanoprost (XALATAN) 0.005 % ophthalmic solution PLACE 1 DROP INTO EACH EYE AT BEDTIME  6   • levothyroxine (SYNTHROID, LEVOTHROID) 150 MCG tablet TAKE 1 TABLET BY MOUTH EVERY MORNING BEFORE BREAKFAST 90 tablet 1   • lisinopril (PRINIVIL,ZESTRIL) 20 MG tablet Take 1 tablet by mouth Daily. 90 tablet 3   • nitroglycerin (NITROSTAT) 0.4 MG SL tablet DISSOLVE 1 TABLET BY MOUTH UNDER THE TONGUE AS NEEDED FOR CHEST PAIN, DO NOT EXCEED MORE THAN 3 DOSES IN 15 MINUTES 25 tablet 11   • ONETOUCH DELICA LANCETS FINE misc USE THREE TIMES DAILY TO TEST 100 each 3   • OneTouch Ultra test strip USE THREE TIMES DAILY 100 each 11   • pseudoephedrine (Sudafed) 30 MG tablet Take 1 tablet by mouth Every 4 (Four) Hours As Needed for Congestion. 30  tablet 1   • raNITIdine (ZANTAC) 150 MG tablet Take 1 tablet by mouth 2 (Two) Times a Day. 180 tablet 3   • Semaglutide,0.25 or 0.5MG/DOS, (OZEMPIC, 0.25 OR 0.5 MG/DOSE,) 2 MG/1.5ML solution pen-injector Inject 0.5 mg under the skin into the appropriate area as directed 1 (One) Time Per Week. 0.5 mg weekly 1 pen 11   • sucralfate (CARAFATE) 1 g tablet Take 1 tablet by mouth 4 (Four) Times a Day. 120 tablet 11     No current facility-administered medications for this visit.        The following portions of the patient's history were reviewed and updated as appropriate: allergies, current medications, past family history, past medical history, past social history, past surgical history and problem list.        Assessment/Plan   Diagnoses and all orders for this visit:    1. Sore throat (Primary)  -     COVID-19, [facility] IN-HOUSE, NP SWAB IN TRANSPORT MEDIA 8-10 HR TAT - Swab, Oropharynx    2. Diarrhea, unspecified type    3. Sinus congestion  -     triamcinolone acetonide (KENALOG-40) injection 80 mg      Diarrhea resolved.                      No follow-ups on file.                  This document has been electronically signed by Chon Nguyễn MD on September 2, 2021 14:56 CDT

## 2021-09-03 LAB — SARS-COV-2 N GENE RESP QL NAA+PROBE: NOT DETECTED

## 2021-09-27 ENCOUNTER — TELEPHONE (OUTPATIENT)
Dept: ENDOCRINOLOGY | Facility: CLINIC | Age: 66
End: 2021-09-27

## 2021-10-12 ENCOUNTER — TELEPHONE (OUTPATIENT)
Dept: ENDOCRINOLOGY | Facility: CLINIC | Age: 66
End: 2021-10-12

## 2021-10-12 RX ORDER — INSULIN GLARGINE 100 [IU]/ML
10 INJECTION, SOLUTION SUBCUTANEOUS DAILY
Qty: 20 ML | Refills: 12 | Status: SHIPPED | OUTPATIENT
Start: 2021-10-12 | End: 2021-10-18

## 2021-10-12 RX ORDER — INSULIN GLARGINE 100 [IU]/ML
10 INJECTION, SOLUTION SUBCUTANEOUS NIGHTLY
Qty: 20 ML | Refills: 12 | Status: SHIPPED | OUTPATIENT
Start: 2021-10-12 | End: 2021-11-01

## 2021-10-12 NOTE — TELEPHONE ENCOUNTER
Pt says as of Oct 1 st won't cover lantus but will cover semglee insulin glargine       Needs a script sent to [facility] in [place], [place]     Pt is low

## 2021-10-12 NOTE — TELEPHONE ENCOUNTER
Billy Pt called and [facility]  said must say Semglee only cannot say Lantus at all for insurance to cover

## 2021-10-15 ENCOUNTER — TELEPHONE (OUTPATIENT)
Dept: ENDOCRINOLOGY | Facility: CLINIC | Age: 66
End: 2021-10-15

## 2021-10-15 NOTE — TELEPHONE ENCOUNTER
Pt called and insulin glargine (Semglee) 100 UNIT/ML injectioninsulin glargine (Semglee) 100 UNIT/ML injection is not covered on insurance. Pt is requesting Levemir flex long acting. Pharmacy is [facility] in [place]. Please call pt is new script is called in. Thanks

## 2021-10-18 ENCOUNTER — TELEPHONE (OUTPATIENT)
Dept: ENDOCRINOLOGY | Facility: CLINIC | Age: 66
End: 2021-10-18

## 2021-10-18 DIAGNOSIS — Z79.4 TYPE 2 DIABETES MELLITUS WITH HYPERGLYCEMIA, WITH LONG-TERM CURRENT USE OF INSULIN (HCC): Primary | ICD-10-CM

## 2021-10-18 DIAGNOSIS — E11.65 TYPE 2 DIABETES MELLITUS WITH HYPERGLYCEMIA, WITH LONG-TERM CURRENT USE OF INSULIN (HCC): Primary | ICD-10-CM

## 2021-10-18 RX ORDER — INSULIN DETEMIR 100 [IU]/ML
20 INJECTION, SOLUTION SUBCUTANEOUS DAILY
Qty: 2 PEN | Refills: 11 | Status: SHIPPED | OUTPATIENT
Start: 2021-10-18 | End: 2022-09-12

## 2021-10-18 NOTE — TELEPHONE ENCOUNTER
Pt says insurance will cover levemier if we can call [facility] [place]    [facility] semglee not [facility]     Pt is really low on his lantus that ins. No longer covers

## 2021-10-18 NOTE — TELEPHONE ENCOUNTER
I sent Levemir and this morning, Anabela sent me the message and I do not know who sent you this message but can we please ask that they are not sending double messages

## 2021-10-19 ENCOUNTER — TELEPHONE (OUTPATIENT)
Dept: FAMILY MEDICINE CLINIC | Facility: CLINIC | Age: 66
End: 2021-10-19

## 2021-10-19 ENCOUNTER — TELEPHONE (OUTPATIENT)
Dept: ENDOCRINOLOGY | Facility: CLINIC | Age: 66
End: 2021-10-19

## 2021-10-19 DIAGNOSIS — Z79.4 TYPE 2 DIABETES MELLITUS WITH HYPERGLYCEMIA, WITH LONG-TERM CURRENT USE OF INSULIN (HCC): ICD-10-CM

## 2021-10-19 DIAGNOSIS — E11.65 TYPE 2 DIABETES MELLITUS WITH HYPERGLYCEMIA, WITH LONG-TERM CURRENT USE OF INSULIN (HCC): ICD-10-CM

## 2021-10-19 NOTE — TELEPHONE ENCOUNTER
Pt needs sensors sent to [facility] in [place]. Looks like they were sent to [facility] and pt states they don't take his insurance. Thank you.

## 2021-10-19 NOTE — TELEPHONE ENCOUNTER
Incoming Refill Request      Medication requested (name and dose): Freestyle Britta 2 Sensor    Pharmacy where request should be sent: [facility]    Additional details provided by patient: Patient states he is out    Best call back number: [phone]    Does the patient have less than a 3 day supply:  [x] Yes  [] No    Rubens Cadena Rep  10/19/21, 11:24 CDT

## 2021-10-19 NOTE — TELEPHONE ENCOUNTER
INFORMED PATIENT THE [employer] RAJESH 2 SENSOR WAS SENT TO [facility]

## 2021-11-01 ENCOUNTER — OFFICE VISIT (OUTPATIENT)
Dept: ENDOCRINOLOGY | Facility: CLINIC | Age: 66
End: 2021-11-01

## 2021-11-01 ENCOUNTER — LAB (OUTPATIENT)
Dept: LAB | Facility: HOSPITAL | Age: 66
End: 2021-11-01

## 2021-11-01 VITALS
HEART RATE: 70 BPM | WEIGHT: 262.7 LBS | BODY MASS INDEX: 37.61 KG/M2 | OXYGEN SATURATION: 99 % | SYSTOLIC BLOOD PRESSURE: 146 MMHG | DIASTOLIC BLOOD PRESSURE: 64 MMHG | HEIGHT: 70 IN

## 2021-11-01 DIAGNOSIS — E03.9 ACQUIRED HYPOTHYROIDISM: Primary | ICD-10-CM

## 2021-11-01 DIAGNOSIS — E11.8 TYPE 2 DIABETES MELLITUS WITH COMPLICATION (HCC): ICD-10-CM

## 2021-11-01 DIAGNOSIS — E11.69 MIXED DIABETIC HYPERLIPIDEMIA ASSOCIATED WITH TYPE 2 DIABETES MELLITUS (HCC): ICD-10-CM

## 2021-11-01 DIAGNOSIS — E11.59 HYPERTENSION ASSOCIATED WITH DIABETES (HCC): ICD-10-CM

## 2021-11-01 DIAGNOSIS — I15.2 HYPERTENSION ASSOCIATED WITH DIABETES (HCC): ICD-10-CM

## 2021-11-01 DIAGNOSIS — E55.9 VITAMIN D DEFICIENCY: ICD-10-CM

## 2021-11-01 DIAGNOSIS — E78.2 MIXED DIABETIC HYPERLIPIDEMIA ASSOCIATED WITH TYPE 2 DIABETES MELLITUS (HCC): ICD-10-CM

## 2021-11-01 LAB
25(OH)D3 SERPL-MCNC: 30.2 NG/ML
ALBUMIN SERPL-MCNC: 4.5 G/DL (ref 3.5–5.2)
ALBUMIN UR-MCNC: 1.3 MG/DL
ALBUMIN/GLOB SERPL: 1.7 G/DL
ALP SERPL-CCNC: 54 U/L (ref 39–117)
ALT SERPL W P-5'-P-CCNC: 26 U/L (ref 1–41)
ANION GAP SERPL CALCULATED.3IONS-SCNC: 10.3 MMOL/L (ref 5–15)
AST SERPL-CCNC: 18 U/L (ref 1–40)
BASOPHILS # BLD AUTO: 0.07 10*3/MM3 (ref 0–0.2)
BASOPHILS NFR BLD AUTO: 0.9 % (ref 0–1.5)
BILIRUB SERPL-MCNC: 0.7 MG/DL (ref 0–1.2)
BUN SERPL-MCNC: 15 MG/DL (ref 8–23)
BUN/CREAT SERPL: 16.5 (ref 7–25)
CALCIUM SPEC-SCNC: 9.6 MG/DL (ref 8.6–10.5)
CHLORIDE SERPL-SCNC: 100 MMOL/L (ref 98–107)
CHOLEST SERPL-MCNC: 145 MG/DL (ref 0–200)
CO2 SERPL-SCNC: 26.7 MMOL/L (ref 22–29)
CREAT SERPL-MCNC: 0.91 MG/DL (ref 0.76–1.27)
CREAT UR-MCNC: 112 MG/DL
CREAT UR-MCNC: 119.6 MG/DL
DEPRECATED RDW RBC AUTO: 45.1 FL (ref 37–54)
EOSINOPHIL # BLD AUTO: 0.14 10*3/MM3 (ref 0–0.4)
EOSINOPHIL NFR BLD AUTO: 1.7 % (ref 0.3–6.2)
ERYTHROCYTE [DISTWIDTH] IN BLOOD BY AUTOMATED COUNT: 14.8 % (ref 12.3–15.4)
GFR SERPL CREATININE-BSD FRML MDRD: 84 ML/MIN/1.73
GLOBULIN UR ELPH-MCNC: 2.6 GM/DL
GLUCOSE SERPL-MCNC: 151 MG/DL (ref 65–99)
HBA1C MFR BLD: 8.2 % (ref 4.8–5.6)
HCT VFR BLD AUTO: 45 % (ref 37.5–51)
HDLC SERPL-MCNC: 39 MG/DL (ref 40–60)
HGB BLD-MCNC: 15.2 G/DL (ref 13–17.7)
IMM GRANULOCYTES # BLD AUTO: 0.04 10*3/MM3 (ref 0–0.05)
IMM GRANULOCYTES NFR BLD AUTO: 0.5 % (ref 0–0.5)
LDLC SERPL CALC-MCNC: 84 MG/DL (ref 0–100)
LDLC/HDLC SERPL: 2.09 {RATIO}
LYMPHOCYTES # BLD AUTO: 2.41 10*3/MM3 (ref 0.7–3.1)
LYMPHOCYTES NFR BLD AUTO: 29.8 % (ref 19.6–45.3)
MCH RBC QN AUTO: 28.9 PG (ref 26.6–33)
MCHC RBC AUTO-ENTMCNC: 33.8 G/DL (ref 31.5–35.7)
MCV RBC AUTO: 85.6 FL (ref 79–97)
MICROALBUMIN/CREAT UR: 11.6 MG/G
MONOCYTES # BLD AUTO: 0.68 10*3/MM3 (ref 0.1–0.9)
MONOCYTES NFR BLD AUTO: 8.4 % (ref 5–12)
NEUTROPHILS NFR BLD AUTO: 4.74 10*3/MM3 (ref 1.7–7)
NEUTROPHILS NFR BLD AUTO: 58.7 % (ref 42.7–76)
NRBC BLD AUTO-RTO: 0 /100 WBC (ref 0–0.2)
PLATELET # BLD AUTO: 181 10*3/MM3 (ref 140–450)
PMV BLD AUTO: 11.5 FL (ref 6–12)
POTASSIUM SERPL-SCNC: 4.4 MMOL/L (ref 3.5–5.2)
PROT SERPL-MCNC: 7.1 G/DL (ref 6–8.5)
PROT UR-MCNC: 8 MG/DL
PROT/CREAT UR: 66.9 MG/G CREA (ref 0–200)
RBC # BLD AUTO: 5.26 10*6/MM3 (ref 4.14–5.8)
SODIUM SERPL-SCNC: 137 MMOL/L (ref 136–145)
TRIGL SERPL-MCNC: 123 MG/DL (ref 0–150)
TSH SERPL DL<=0.05 MIU/L-ACNC: 1.47 UIU/ML (ref 0.27–4.2)
VIT B12 BLD-MCNC: 285 PG/ML (ref 211–946)
VLDLC SERPL-MCNC: 22 MG/DL (ref 5–40)
WBC # BLD AUTO: 8.08 10*3/MM3 (ref 3.4–10.8)

## 2021-11-01 PROCEDURE — 85025 COMPLETE CBC W/AUTO DIFF WBC: CPT | Performed by: NURSE PRACTITIONER

## 2021-11-01 PROCEDURE — 84156 ASSAY OF PROTEIN URINE: CPT | Performed by: NURSE PRACTITIONER

## 2021-11-01 PROCEDURE — 82570 ASSAY OF URINE CREATININE: CPT | Performed by: NURSE PRACTITIONER

## 2021-11-01 PROCEDURE — 82043 UR ALBUMIN QUANTITATIVE: CPT | Performed by: NURSE PRACTITIONER

## 2021-11-01 PROCEDURE — 95251 CONT GLUC MNTR ANALYSIS I&R: CPT | Performed by: NURSE PRACTITIONER

## 2021-11-01 PROCEDURE — 82607 VITAMIN B-12: CPT | Performed by: NURSE PRACTITIONER

## 2021-11-01 PROCEDURE — 80061 LIPID PANEL: CPT | Performed by: NURSE PRACTITIONER

## 2021-11-01 PROCEDURE — 82306 VITAMIN D 25 HYDROXY: CPT | Performed by: NURSE PRACTITIONER

## 2021-11-01 PROCEDURE — 80053 COMPREHEN METABOLIC PANEL: CPT | Performed by: NURSE PRACTITIONER

## 2021-11-01 PROCEDURE — 84443 ASSAY THYROID STIM HORMONE: CPT | Performed by: NURSE PRACTITIONER

## 2021-11-01 PROCEDURE — 36415 COLL VENOUS BLD VENIPUNCTURE: CPT | Performed by: NURSE PRACTITIONER

## 2021-11-01 PROCEDURE — 83036 HEMOGLOBIN GLYCOSYLATED A1C: CPT | Performed by: NURSE PRACTITIONER

## 2021-11-01 PROCEDURE — 99214 OFFICE O/P EST MOD 30 MIN: CPT | Performed by: NURSE PRACTITIONER

## 2021-11-01 RX ORDER — INSULIN LISPRO 200 [IU]/ML
35 INJECTION, SOLUTION SUBCUTANEOUS 3 TIMES DAILY
Qty: 12 PEN | Refills: 3 | Status: SHIPPED | OUTPATIENT
Start: 2021-11-01 | End: 2021-12-27

## 2021-11-01 NOTE — PROGRESS NOTES
"Chief Complaint  Diabetes    Subjective          Pablo Pina presents to [facility] ENDOCRINOLOGY  History of Present Illness     In office visit       Primary Care Provider     Chon Nguyễn MD        65-year-old male presents for follow-up     Reason diabetes mellitus type 2     Timing constant     Duration diagnosed in 2010     Quality near controlled     Severity is high     Microvascular complications peripheral neuropathy     Macrovascular complications none     Current symptoms today--none      Lab Results   Component Value Date    HGBA1C 8.23 (H) 05/05/2021          Current diabetes regimen--- mealtime insulin      Current glucose monitoring system              Checks 4 times daily        Britta personal use         Downloaded and reviewed      See below               Hypoglycemia  has been documented in the past     Review of Systems - General ROS: negative             Objective   Vital Signs:   /64   Pulse 70   Ht 177.8 cm (70\")   Wt 119 kg (262 lb 11.2 oz)   SpO2 99%   BMI 37.69 kg/m²     Physical Exam  Constitutional:       Appearance: Normal appearance.   Cardiovascular:      Rate and Rhythm: Regular rhythm.      Heart sounds: Normal heart sounds.   Pulmonary:      Breath sounds: Normal breath sounds.   Musculoskeletal:      Cervical back: Normal range of motion.   Neurological:      Mental Status: He is alert.        Result Review :   The following data was reviewed by: COLLEEN Carcamo on 11/01/2021:  Common labs    Common Labsle 5/5/21 5/5/21 5/5/21 5/5/21 5/5/21    0924 0924 0924 0924 0924   Glucose    178 (A)    BUN    14    Creatinine    0.88    eGFR Non African Am    87    Sodium    137    Potassium    4.3    Chloride    99    Calcium    9.2    Albumin    4.20    Total Bilirubin    0.6    Alkaline Phosphatase    72    AST (SGOT)    11    ALT (SGPT)    23    WBC 9.51       Hemoglobin 15.6       Hematocrit 45.5       Platelets 252     "   Total Cholesterol     155   Triglycerides     171 (A)   HDL Cholesterol     36 (A)   LDL Cholesterol      89   Hemoglobin A1C  8.23 (A)      Microalbumin, Urine   1.2     (A) Abnormal value                      Assessment and Plan    Diagnoses and all orders for this visit:    1. Acquired hypothyroidism (Primary)  -     CBC & Differential  -     Comprehensive Metabolic Panel  -     Hemoglobin A1c  -     Lipid Panel  -     Microalbumin / Creatinine Urine Ratio - Urine, Clean Catch  -     Protein / Creatinine Ratio, Urine - Urine, Clean Catch  -     TSH  -     Vitamin B12  -     Vitamin D 25 Hydroxy    2. Type 2 diabetes mellitus with complication (HCC)  -     Insulin Lispro (HumaLOG KwikPen) 200 UNIT/ML solution pen-injector; Inject 35 Units under the skin into the appropriate area as directed 3 (Three) Times a Day.  Dispense: 12 pen; Refill: 3  -     CBC & Differential  -     Comprehensive Metabolic Panel  -     Hemoglobin A1c  -     Lipid Panel  -     Microalbumin / Creatinine Urine Ratio - Urine, Clean Catch  -     Protein / Creatinine Ratio, Urine - Urine, Clean Catch  -     TSH  -     Vitamin B12  -     Vitamin D 25 Hydroxy    3. Hypertension associated with diabetes (HCC)  -     Insulin Lispro (HumaLOG KwikPen) 200 UNIT/ML solution pen-injector; Inject 35 Units under the skin into the appropriate area as directed 3 (Three) Times a Day.  Dispense: 12 pen; Refill: 3  -     CBC & Differential  -     Comprehensive Metabolic Panel  -     Hemoglobin A1c  -     Lipid Panel  -     Microalbumin / Creatinine Urine Ratio - Urine, Clean Catch  -     Protein / Creatinine Ratio, Urine - Urine, Clean Catch  -     TSH  -     Vitamin B12  -     Vitamin D 25 Hydroxy    4. Mixed diabetic hyperlipidemia associated with type 2 diabetes mellitus (HCC)  -     Insulin Lispro (HumaLOG KwikPen) 200 UNIT/ML solution pen-injector; Inject 35 Units under the skin into the appropriate area as directed 3 (Three) Times a Day.  Dispense: 12  pen; Refill: 3  -     CBC & Differential  -     Comprehensive Metabolic Panel  -     Hemoglobin A1c  -     Lipid Panel  -     Microalbumin / Creatinine Urine Ratio - Urine, Clean Catch  -     Protein / Creatinine Ratio, Urine - Urine, Clean Catch  -     TSH  -     Vitamin B12  -     Vitamin D 25 Hydroxy    5. Vitamin D deficiency  -     Insulin Lispro (HumaLOG KwikPen) 200 UNIT/ML solution pen-injector; Inject 35 Units under the skin into the appropriate area as directed 3 (Three) Times a Day.  Dispense: 12 pen; Refill: 3  -     CBC & Differential  -     Comprehensive Metabolic Panel  -     Hemoglobin A1c  -     Lipid Panel  -     Microalbumin / Creatinine Urine Ratio - Urine, Clean Catch  -     Protein / Creatinine Ratio, Urine - Urine, Clean Catch  -     TSH  -     Vitamin B12  -     Vitamin D 25 Hydroxy              Glycemic management     Diabetes mellitus type 2     Lab Results   Component Value Date    HGBA1C 8.23 (H) 05/05/2021       Britta personal use         Downloaded and reviewed      Dated from Oct. 19 to Nov. 1, 2021      Average BG - 151     Target range  82%      High 18%      Very high -- 0%      Low 0 %      Very low 0 %      Overall at goal     Postprandial hyperglycemia occassional          Ozempic was stopped due to nausea     Metformin stopped due to diarrhea            Lantus taking 32 units          Taking Humalog 20 up to 30 units 3 times a day with meals    He is adjusting his insulin based on CGM                              Lipid Management     Taking Lipitor 40 mg 1 at night        Blood Pressure Management:          Taking coreg 3.125 mg BID         Microvascular Complication Monitoring:       Last eye exam -- Oct. 2020, no DR      Cataract bilateral  -----------     Last Microalbumin-Proteinuria Assessment     No results found for: MICROALBUR, [id number]         Neuropathy yes  -----------           Weight Related:         Patient's Body mass index is 37.69 kg/m². indicating that he is  obese (BMI >30). Obesity-related health conditions include the following: diabetes mellitus. Obesity is unchanged. BMI is is above average; no BMI management plan is appropriate. We discussed portion control and increasing exercise..       Bone Health     Vitamin D deficiency     Taking vitamin D     Thyroid Health     Hypothyroidism taking levothyroxine 150 mcg daily      Lab Results   Component Value Date    TSH 6.100 (H) 05/05/2021                Other Diabetes Related Aspects         Lab Results   Component Value Date    [id number] 389 05/05/2021                 Follow Up   Return in about 4 months (around 3/1/2022) for Recheck.  Patient was given instructions and counseling regarding his condition or for health maintenance advice. Please see specific information pulled into the AVS if appropriate.         This document has been electronically signed by COLLEEN Carcamo on November 1, 2021 09:09 CDT.

## 2021-11-02 ENCOUNTER — TELEPHONE (OUTPATIENT)
Dept: ENDOCRINOLOGY | Facility: CLINIC | Age: 66
End: 2021-11-02

## 2021-11-03 DIAGNOSIS — I15.2 HYPERTENSION ASSOCIATED WITH DIABETES (HCC): ICD-10-CM

## 2021-11-03 DIAGNOSIS — E78.2 MIXED DIABETIC HYPERLIPIDEMIA ASSOCIATED WITH TYPE 2 DIABETES MELLITUS (HCC): ICD-10-CM

## 2021-11-03 DIAGNOSIS — E11.59 HYPERTENSION ASSOCIATED WITH DIABETES (HCC): ICD-10-CM

## 2021-11-03 DIAGNOSIS — E11.69 MIXED DIABETIC HYPERLIPIDEMIA ASSOCIATED WITH TYPE 2 DIABETES MELLITUS (HCC): ICD-10-CM

## 2021-11-03 DIAGNOSIS — E11.8 TYPE 2 DIABETES MELLITUS WITH COMPLICATION (HCC): ICD-10-CM

## 2021-11-03 DIAGNOSIS — E55.9 VITAMIN D DEFICIENCY: ICD-10-CM

## 2021-11-03 RX ORDER — FLURBIPROFEN SODIUM 0.3 MG/ML
SOLUTION/ DROPS OPHTHALMIC
Qty: 120 EACH | Refills: 11 | Status: SHIPPED | OUTPATIENT
Start: 2021-11-03

## 2021-11-03 RX ORDER — CARVEDILOL 3.12 MG/1
TABLET ORAL
Qty: 180 TABLET | Refills: 2 | Status: SHIPPED | OUTPATIENT
Start: 2021-11-03 | End: 2021-12-02 | Stop reason: SDUPTHER

## 2021-11-12 RX ORDER — ATORVASTATIN CALCIUM 40 MG/1
TABLET, FILM COATED ORAL
Qty: 30 TABLET | Refills: 5 | Status: SHIPPED | OUTPATIENT
Start: 2021-11-12 | End: 2022-07-12

## 2021-12-02 ENCOUNTER — TELEPHONE (OUTPATIENT)
Dept: ENDOCRINOLOGY | Facility: CLINIC | Age: 66
End: 2021-12-02

## 2021-12-02 ENCOUNTER — TELEPHONE (OUTPATIENT)
Dept: FAMILY MEDICINE CLINIC | Facility: CLINIC | Age: 66
End: 2021-12-02

## 2021-12-02 DIAGNOSIS — Z79.4 TYPE 2 DIABETES MELLITUS WITH HYPERGLYCEMIA, WITH LONG-TERM CURRENT USE OF INSULIN (HCC): ICD-10-CM

## 2021-12-02 DIAGNOSIS — I15.2 HYPERTENSION ASSOCIATED WITH DIABETES (HCC): Primary | ICD-10-CM

## 2021-12-02 DIAGNOSIS — E11.59 HYPERTENSION ASSOCIATED WITH DIABETES (HCC): Primary | ICD-10-CM

## 2021-12-02 DIAGNOSIS — E11.65 TYPE 2 DIABETES MELLITUS WITH HYPERGLYCEMIA, WITH LONG-TERM CURRENT USE OF INSULIN (HCC): ICD-10-CM

## 2021-12-02 RX ORDER — ATORVASTATIN CALCIUM 40 MG/1
40 TABLET, FILM COATED ORAL DAILY
Qty: 90 TABLET | Refills: 0 | Status: SHIPPED | OUTPATIENT
Start: 2021-12-02 | End: 2022-03-02

## 2021-12-02 RX ORDER — LEVOTHYROXINE SODIUM 0.15 MG/1
150 TABLET ORAL
Qty: 90 TABLET | Refills: 1 | Status: SHIPPED | OUTPATIENT
Start: 2021-12-02 | End: 2022-01-18

## 2021-12-02 RX ORDER — CARVEDILOL 3.12 MG/1
3.12 TABLET ORAL 2 TIMES DAILY WITH MEALS
Qty: 180 TABLET | Refills: 3 | Status: SHIPPED | OUTPATIENT
Start: 2021-12-02 | End: 2022-05-31

## 2021-12-02 RX ORDER — FLASH GLUCOSE SENSOR
1 KIT MISCELLANEOUS
Qty: 13 EACH | Refills: 3 | Status: SHIPPED | OUTPATIENT
Start: 2021-12-02

## 2021-12-02 NOTE — TELEPHONE ENCOUNTER
Incoming Refill Request    Medication requested (name and dose): Synthroid and say may substitute 150 MCG, Carvedilol 3.125 MG, Free Style Britta 2  (90 day supply)    Pharmacy where request should be sent: [facility] F[facility] [phone]    Additional details provided by patient: patient said that instead of Levothyroxine to order Synthroid and say may substitute so that it does not cost as much    Best call back number: [phone]    Does the patient have less than a 3 day supply:  [x] Yes  [] No    Rubens Cadena Rep  12/02/21, 10:57 CST

## 2021-12-02 NOTE — TELEPHONE ENCOUNTER
PATIENTS WIFE CALLED STATING THEY ARE CHANGING INSURANCES AND NEEDS THE PRESCRIPTION     atorvastatin (LIPITOR) 40 MG tablet    FOR 90 DAY SUPPLY    THIS PRESCRIPTION ONLY  THE REST GO TO [facility]     SENT TO     [facility]     PHONE# [phone]  FAX# [phone]    PLEASE ADVISE    THANKS

## 2021-12-24 DIAGNOSIS — E11.69 MIXED DIABETIC HYPERLIPIDEMIA ASSOCIATED WITH TYPE 2 DIABETES MELLITUS (HCC): ICD-10-CM

## 2021-12-24 DIAGNOSIS — E11.59 HYPERTENSION ASSOCIATED WITH DIABETES (HCC): ICD-10-CM

## 2021-12-24 DIAGNOSIS — E11.8 TYPE 2 DIABETES MELLITUS WITH COMPLICATION (HCC): ICD-10-CM

## 2021-12-24 DIAGNOSIS — I15.2 HYPERTENSION ASSOCIATED WITH DIABETES (HCC): ICD-10-CM

## 2021-12-24 DIAGNOSIS — E55.9 VITAMIN D DEFICIENCY: ICD-10-CM

## 2021-12-24 DIAGNOSIS — E78.2 MIXED DIABETIC HYPERLIPIDEMIA ASSOCIATED WITH TYPE 2 DIABETES MELLITUS (HCC): ICD-10-CM

## 2021-12-27 RX ORDER — INSULIN LISPRO 200 [IU]/ML
INJECTION, SOLUTION SUBCUTANEOUS
Qty: 6 PEN | Refills: 15 | Status: SHIPPED | OUTPATIENT
Start: 2021-12-27 | End: 2022-01-14 | Stop reason: CLARIF

## 2022-01-14 ENCOUNTER — DOCUMENTATION (OUTPATIENT)
Dept: ENDOCRINOLOGY | Facility: CLINIC | Age: 67
End: 2022-01-14

## 2022-01-14 RX ORDER — INSULIN ASPART 100 [IU]/ML
15 INJECTION, SOLUTION INTRAVENOUS; SUBCUTANEOUS
Qty: 6 PEN | Refills: 3 | Status: SHIPPED | OUTPATIENT
Start: 2022-01-14 | End: 2022-09-07

## 2022-01-14 NOTE — PROGRESS NOTES
Humalog is not on patients formulary. Novolog is preferred so a Rx is sent [facility] for Novolog Kwikpen

## 2022-01-18 RX ORDER — LEVOTHYROXINE SODIUM 0.15 MG/1
TABLET ORAL
Qty: 90 TABLET | Refills: 0 | Status: SHIPPED | OUTPATIENT
Start: 2022-01-18 | End: 2022-03-31

## 2022-03-01 ENCOUNTER — OFFICE VISIT (OUTPATIENT)
Dept: ENDOCRINOLOGY | Facility: CLINIC | Age: 67
End: 2022-03-01

## 2022-03-01 VITALS
BODY MASS INDEX: 37.47 KG/M2 | WEIGHT: 261.7 LBS | HEIGHT: 70 IN | SYSTOLIC BLOOD PRESSURE: 146 MMHG | OXYGEN SATURATION: 98 % | HEART RATE: 68 BPM | DIASTOLIC BLOOD PRESSURE: 64 MMHG

## 2022-03-01 DIAGNOSIS — E11.65 TYPE 2 DIABETES MELLITUS WITH HYPERGLYCEMIA, WITH LONG-TERM CURRENT USE OF INSULIN: Primary | ICD-10-CM

## 2022-03-01 DIAGNOSIS — E03.9 ACQUIRED HYPOTHYROIDISM: ICD-10-CM

## 2022-03-01 DIAGNOSIS — I10 ESSENTIAL HYPERTENSION: ICD-10-CM

## 2022-03-01 DIAGNOSIS — Z79.4 TYPE 2 DIABETES MELLITUS WITH HYPERGLYCEMIA, WITH LONG-TERM CURRENT USE OF INSULIN: Primary | ICD-10-CM

## 2022-03-01 DIAGNOSIS — E78.5 HYPERLIPIDEMIA, UNSPECIFIED HYPERLIPIDEMIA TYPE: ICD-10-CM

## 2022-03-01 PROCEDURE — 95251 CONT GLUC MNTR ANALYSIS I&R: CPT | Performed by: NURSE PRACTITIONER

## 2022-03-01 PROCEDURE — 99214 OFFICE O/P EST MOD 30 MIN: CPT | Performed by: NURSE PRACTITIONER

## 2022-03-01 NOTE — PROGRESS NOTES
"Chief Complaint  Diabetes    Subjective          Pablo Pina presents to [facility] ENDOCRINOLOGY  History of Present Illness     In office visit      Primary Care Provider     Chon Nguyễn MD        66-year-old male presents for follow-up     Reason diabetes mellitus type 2     Timing constant     Duration diagnosed in 2010     Quality near controlled     Severity is high     Microvascular complications peripheral neuropathy     Macrovascular complications none            Lab Results   Component Value Date    HGBA1C 8.20 (H) 11/01/2021          Current diabetes regimen--- mealtime insulin      Current glucose monitoring system              Checks 4 times daily        Britta personal use         Downloaded and reviewed      See below               Hypoglycemia  has been documented in the past       Review of Systems - General ROS: negative    Objective   Vital Signs:   /64   Pulse 68   Ht 177.8 cm (70\")   Wt 119 kg (261 lb 11.2 oz)   SpO2 98%   BMI 37.55 kg/m²     Physical Exam  Constitutional:       Appearance: Normal appearance.   Cardiovascular:      Rate and Rhythm: Regular rhythm.      Heart sounds: Normal heart sounds.   Pulmonary:      Breath sounds: Normal breath sounds.   Musculoskeletal:      Cervical back: Normal range of motion.   Neurological:      Mental Status: He is alert.        Result Review :   The following data was reviewed by: COLLEEN Carcamo on 03/01/2022:  Common labs    Common Labsle 5/5/21 5/5/21 5/5/21 5/5/21 5/5/21 11/1/21 11/1/21 11/1/21 11/1/21 11/1/21    0924 0924 0924 0924 0924 0921 0921 0921 0921 1011   Glucose    178 (A)    151 (A)     BUN    14    15     Creatinine    0.88    0.91     eGFR Non  Am    87    84     Sodium    137    137     Potassium    4.3    4.4     Chloride    99    100     Calcium    9.2    9.6     Albumin    4.20    4.50     Total Bilirubin    0.6    0.7     Alkaline Phosphatase    72    54   "   AST (SGOT)    11    18     ALT (SGPT)    23    26     WBC 9.51     8.08       Hemoglobin 15.6     15.2       Hematocrit 45.5     45.0       Platelets 252     181       Total Cholesterol     155    145    Triglycerides     171 (A)    123    HDL Cholesterol     36 (A)    39 (A)    LDL Cholesterol      89    84    Hemoglobin A1C  8.23 (A)     8.20 (A)      Microalbumin, Urine   1.2       1.3   (A) Abnormal value                      Assessment and Plan    Diagnoses and all orders for this visit:    1. Type 2 diabetes mellitus with hyperglycemia, with long-term current use of insulin (HCC) (Primary)  -     CBC & Differential; Future  -     Comprehensive Metabolic Panel; Future  -     Hemoglobin A1c; Future  -     Lipid Panel; Future  -     Microalbumin / Creatinine Urine Ratio - Urine, Clean Catch; Future  -     TSH; Future  -     Vitamin B12; Future  -     Vitamin D 25 Hydroxy; Future    2. Acquired hypothyroidism  -     CBC & Differential; Future  -     Comprehensive Metabolic Panel; Future  -     Hemoglobin A1c; Future  -     Lipid Panel; Future  -     Microalbumin / Creatinine Urine Ratio - Urine, Clean Catch; Future  -     TSH; Future  -     Vitamin B12; Future  -     Vitamin D 25 Hydroxy; Future    3. Essential hypertension  -     CBC & Differential; Future  -     Comprehensive Metabolic Panel; Future  -     Hemoglobin A1c; Future  -     Lipid Panel; Future  -     Microalbumin / Creatinine Urine Ratio - Urine, Clean Catch; Future  -     TSH; Future  -     Vitamin B12; Future  -     Vitamin D 25 Hydroxy; Future    4. Hyperlipidemia, unspecified hyperlipidemia type  -     CBC & Differential; Future  -     Comprehensive Metabolic Panel; Future  -     Hemoglobin A1c; Future  -     Lipid Panel; Future  -     Microalbumin / Creatinine Urine Ratio - Urine, Clean Catch; Future  -     TSH; Future  -     Vitamin B12; Future  -     Vitamin D 25 Hydroxy; Future           Glycemic management     Diabetes mellitus type  2     Lab Results   Component Value Date    HGBA1C 8.20 (H) 11/01/2021         Britta personal use         Downloaded and reviewed      Dated from Feb. 16 to March 1, 2022      Average BG -204      Target range - 35%      High 47%     Very high --18%      Low 0 %      Very low 0 %     He has been missing long acting insulin due to finances     Give samples of levemir today to restart         Ozempic was stopped due to nausea     Metformin stopped due to diarrhea            Levemir  taking 32 units            Taking Humalog 20 up to 30 units 3 times a day with meals     He is adjusting his insulin based on CGM                                 Lipid Management     Taking Lipitor 40 mg 1 at night        Blood Pressure Management:          Taking coreg 3.125 mg BID         Microvascular Complication Monitoring:       Last eye exam -- Oct. 2020, no DR      Cataract bilateral  -----------     Last Microalbumin-Proteinuria Assessment     No results found for: MICROALBUR, [id number]         Neuropathy yes  -----------           Weight Related:         Patient's Body mass index is 37.55 kg/m². indicating that he is obese (BMI >30). Obesity-related health conditions include the following: diabetes mellitus. Obesity is unchanged. BMI is is above average; no BMI management plan is appropriate. We discussed portion control and increasing exercise..       Bone Health     Vitamin D deficiency     Taking vitamin D     Thyroid Health     Hypothyroidism taking levothyroxine 150 mcg daily     Lab Results   Component Value Date    TSH 1.470 11/01/2021             Other Diabetes Related Aspects               Lab Results   Component Value Date     [id number] 389 05/05/2021                        Follow Up   Return in about 3 months (around 6/1/2022) for Recheck.  Patient was given instructions and counseling regarding his condition or for health maintenance advice. Please see specific information pulled into the AVS if appropriate.          This document has been electronically signed by COLLEEN Carcamo on March 1, 2022 08:56 CST.

## 2022-03-31 RX ORDER — LEVOTHYROXINE SODIUM 0.15 MG/1
TABLET ORAL
Qty: 90 TABLET | Refills: 0 | Status: SHIPPED | OUTPATIENT
Start: 2022-03-31 | End: 2022-06-20

## 2022-04-18 ENCOUNTER — OFFICE VISIT (OUTPATIENT)
Dept: FAMILY MEDICINE CLINIC | Facility: CLINIC | Age: 67
End: 2022-04-18

## 2022-04-18 VITALS
BODY MASS INDEX: 37.22 KG/M2 | HEART RATE: 71 BPM | HEIGHT: 70 IN | SYSTOLIC BLOOD PRESSURE: 138 MMHG | DIASTOLIC BLOOD PRESSURE: 80 MMHG | TEMPERATURE: 97.3 F | WEIGHT: 260 LBS | OXYGEN SATURATION: 96 %

## 2022-04-18 DIAGNOSIS — R05.9 COUGH: ICD-10-CM

## 2022-04-18 DIAGNOSIS — J30.2 SEASONAL ALLERGIC RHINITIS, UNSPECIFIED TRIGGER: ICD-10-CM

## 2022-04-18 DIAGNOSIS — R06.2 WHEEZING: Primary | ICD-10-CM

## 2022-04-18 PROCEDURE — 99213 OFFICE O/P EST LOW 20 MIN: CPT | Performed by: FAMILY MEDICINE

## 2022-04-18 PROCEDURE — 96372 THER/PROPH/DIAG INJ SC/IM: CPT | Performed by: FAMILY MEDICINE

## 2022-04-18 RX ORDER — BENZONATATE 100 MG/1
100 CAPSULE ORAL 3 TIMES DAILY PRN
Qty: 60 CAPSULE | Refills: 0 | Status: SHIPPED | OUTPATIENT
Start: 2022-04-18 | End: 2022-05-31

## 2022-04-18 RX ORDER — CETIRIZINE HYDROCHLORIDE 10 MG/1
10 TABLET ORAL DAILY
Qty: 30 TABLET | Refills: 11 | Status: SHIPPED | OUTPATIENT
Start: 2022-04-18

## 2022-04-18 RX ORDER — GUAIFENESIN/DEXTROMETHORPHAN 100-10MG/5
10 SYRUP ORAL 4 TIMES DAILY PRN
Qty: 240 ML | Refills: 1 | Status: SHIPPED | OUTPATIENT
Start: 2022-04-18 | End: 2022-06-03

## 2022-04-18 RX ORDER — TRIAMCINOLONE ACETONIDE 40 MG/ML
80 INJECTION, SUSPENSION INTRA-ARTICULAR; INTRAMUSCULAR ONCE
Status: COMPLETED | OUTPATIENT
Start: 2022-04-18 | End: 2022-04-18

## 2022-04-18 RX ORDER — FLUTICASONE PROPIONATE 50 MCG
2 SPRAY, SUSPENSION (ML) NASAL DAILY
Qty: 16 G | Refills: 11 | Status: SHIPPED | OUTPATIENT
Start: 2022-04-18 | End: 2023-02-14

## 2022-04-18 RX ADMIN — TRIAMCINOLONE ACETONIDE 80 MG: 40 INJECTION, SUSPENSION INTRA-ARTICULAR; INTRAMUSCULAR at 12:15

## 2022-04-18 NOTE — PROGRESS NOTES
Injection  Injection performed in    by Malina Hartmann MA. Patient tolerated the procedure well without complications.  04/18/22   Malina Hartmann MA

## 2022-04-18 NOTE — PROGRESS NOTES
" Subjective   Pablo Pina is a 66 y.o. male.     Chief Complaint   Patient presents with   • Allergies             History of Present Illness     Sneezing, itchy eyes, wheezing and cough.  Not taking any allergy meds. Wants a shot. Has diabetes, last hga1c 8    Review of Systems   Constitutional: Negative for chills, fatigue and fever.   HENT: Positive for ear pain, sneezing, sore throat and voice change. Negative for congestion, ear discharge, facial swelling, hearing loss, postnasal drip, rhinorrhea, sinus pressure and trouble swallowing.    Eyes: Positive for discharge. Negative for redness and visual disturbance.   Respiratory: Positive for cough and wheezing. Negative for chest tightness and shortness of breath.    Cardiovascular: Negative for chest pain and palpitations.   Gastrointestinal: Negative for abdominal pain, blood in stool, constipation, diarrhea, nausea and vomiting.   Endocrine: Negative for polydipsia and polyuria.   Genitourinary: Negative for dysuria, flank pain, hematuria and urgency.   Musculoskeletal: Negative for arthralgias, back pain, joint swelling and myalgias.   Skin: Negative for rash.   Neurological: Negative for dizziness, weakness, numbness and headaches.   Hematological: Negative for adenopathy.   Psychiatric/Behavioral: Negative for confusion and sleep disturbance. The patient is not nervous/anxious.            /80 (BP Location: Left arm, Patient Position: Sitting, Cuff Size: Adult)   Pulse 71   Temp 97.3 °F (36.3 °C) (Infrared)   Ht 177.8 cm (70\")   Wt 118 kg (260 lb)   SpO2 96%   BMI 37.31 kg/m²       Objective     Physical Exam  Vitals and nursing note reviewed.   Constitutional:       Appearance: Normal appearance. He is well-developed.   HENT:      Head: Normocephalic and atraumatic.      Right Ear: Tympanic membrane, ear canal and external ear normal.      Left Ear: External ear normal.      Ears:      Comments: Cerumen impaction left ear     Nose: Nose " normal. No rhinorrhea.   Eyes:      General: No scleral icterus.     Extraocular Movements: Extraocular movements intact.      Conjunctiva/sclera: Conjunctivae normal.      Pupils: Pupils are equal, round, and reactive to light.   Cardiovascular:      Rate and Rhythm: Normal rate and regular rhythm.      Heart sounds: Normal heart sounds.     No friction rub. No gallop.   Pulmonary:      Effort: Pulmonary effort is normal.      Breath sounds: Wheezing present.      Comments: tight  Abdominal:      General: Bowel sounds are normal. There is no distension.      Palpations: Abdomen is soft.      Tenderness: There is no abdominal tenderness.   Musculoskeletal:         General: No deformity. Normal range of motion.      Cervical back: Normal range of motion and neck supple.   Skin:     General: Skin is warm and dry.      Findings: No erythema or rash.   Neurological:      Mental Status: He is alert and oriented to person, place, and time.      Cranial Nerves: No cranial nerve deficit.   Psychiatric:         Behavior: Behavior normal.         Thought Content: Thought content normal.         Judgment: Judgment normal.             PAST MEDICAL HISTORY     Past Medical History:   Diagnosis Date   • Acquired hypothyroidism    • Acute bronchitis    • Arm pain     Pain in right arm     • Borderline glaucoma    • Clouded consciousness     wooziness when getting up from laying or sitting position    • Coronary arteriosclerosis    • Encounter for immunization    • Essential hypertension    • Fatigue    • Feces, abnormal     Other fecal abnormalities   • Gastroesophageal reflux disease    • Hypercholesterolemia    • Hyperlipidemia    • Hypermetropia    • Hypertensive disorder    • Impacted cerumen    • Impaired glucose tolerance    • Impotence    • Need for immunization against influenza    • Obstructive sleep apnea syndrome    • Overweight    • Patient noncompliance     with other medical treatment and regimen    • Patient  noncompliance, general    • Presbyopia    • Sacroiliac joint pain    • Sensorineural hearing loss of both ears    • Skin tag    • Type 2 diabetes mellitus (HCC)       PAST SURGICAL HISTORY     Past Surgical History:   Procedure Laterality Date   • CARDIAC CATHETERIZATION N/A 10/9/2018    Procedure: Left Heart Cath;  Surgeon: Lit Rodriguez MD;  Location: [facility] CATH INVASIVE LOCATION;  Service: Cardiology   • CARDIAC CATHETERIZATION N/A 2019    Procedure: Left Heart Cath;  Surgeon: Lit Rodriguez MD;  Location: [facility] CATH INVASIVE LOCATION;  Service: Cardiology   • CORONARY ARTERY BYPASS GRAFT  2005    CABG (1) - LIMA to LAD, SVG to D1, SVG to OM,SVG to PDA.   • INJECTION OF MEDICATION  10/01/2015    Kenalog (1) - OBI Nguyễn   • OTHER SURGICAL HISTORY      Coronary artery stent placement (1)   • OTHER SURGICAL HISTORY  2012    Remove Impacted Cerumen 39513 (1) - OLYA Hammond   • OTHER SURGICAL HISTORY  2014    Skin Tag Removal (Each Additional 10 47590 (1) - OBI Nguyễn   • OTHER SURGICAL HISTORY  10/01/2015    Nebulizer Treatment 99990 (1) - OBI Nguyễn      SOCIAL HISTORY     Social History     Socioeconomic History   • Marital status:    Tobacco Use   • Smoking status: Former Smoker     Packs/day: 1.00     Years: 31.00     Pack years: 31.00     Start date:      Quit date:      Years since quittin.3   • Smokeless tobacco: Never Used   • Tobacco comment: Quit around    Substance and Sexual Activity   • Alcohol use: No   • Drug use: No   • Sexual activity: Never      ALLERGIES   Patient has no known allergies.   MEDICATIONS     Current Outpatient Medications   Medication Sig Dispense Refill   • albuterol sulfate  (90 Base) MCG/ACT inhaler Inhale 2 puffs Every 4 (Four) Hours As Needed for Wheezing. 1 inhaler 11   • aspirin 81 MG chewable tablet Chew 81 mg Every Night.     • atorvastatin (LIPITOR) 40 MG tablet TAKE 1 TABLET BY MOUTH EVERY DAY 30 tablet 5   • carvedilol  (COREG) 3.125 MG tablet Take 1 tablet by mouth 2 (Two) Times a Day With Meals. 180 tablet 3   • cetirizine (zyrTEC) 10 MG tablet Take 1 tablet by mouth Daily. 30 tablet 11   • clopidogrel (PLAVIX) 75 MG tablet Take 75 mg by mouth Daily.     • fluticasone (Flonase) 50 MCG/ACT nasal spray 2 sprays into the nostril(s) as directed by provider Daily. 16 g 11   • insulin aspart (NovoLOG FlexPen) 100 UNIT/ML solution pen-injector sc pen Inject 15 Units under the skin into the appropriate area as directed 3 (Three) Times a Day With Meals. 6 pen 3   • insulin detemir (Levemir FlexTouch) 100 UNIT/ML injection Inject 20 Units under the skin into the appropriate area as directed Daily. 2 pen 11   • levothyroxine (SYNTHROID, LEVOTHROID) 150 MCG tablet TAKE 1 TABLET BY MOUTH EVERY DAY BEFORE BREAKFAST 90 tablet 0   • lisinopril (PRINIVIL,ZESTRIL) 20 MG tablet Take 1 tablet by mouth Daily. 90 tablet 3   • nitroglycerin (NITROSTAT) 0.4 MG SL tablet DISSOLVE 1 TABLET BY MOUTH UNDER THE TONGUE AS NEEDED FOR CHEST PAIN, DO NOT EXCEED MORE THAN 3 DOSES IN 15 MINUTES 25 tablet 11   • raNITIdine (ZANTAC) 150 MG tablet Take 1 tablet by mouth 2 (Two) Times a Day. 180 tablet 3   • Alcohol Swabs 70 % pads 1 each 2 (Two) Times a Day. 100 each 11   • B-D UF III MINI PEN NEEDLES 31G X 5 MM misc USE FOUR TIMES DAILY 120 each 11   • benzonatate (Tessalon Perles) 100 MG capsule Take 1 capsule by mouth 3 (Three) Times a Day As Needed for Cough. 60 capsule 0   • Continuous Blood Gluc  (FreeStyle Britta 2 Rosebud) device 1 each Take As Directed. 28 each 11   • Continuous Blood Gluc Sensor (FreeStyle Britta 14 Day Sensor) misc Inject 1 each under the skin into the appropriate area as directed Every 14 (Fourteen) Days. 13 each 3   • Continuous Blood Gluc Sensor (FreeStyle Britta 2 Sensor) misc 1 each Take As Directed. 28 each 11   • glucose blood test strip      • guaiFENesin-dextromethorphan (ROBITUSSIN DM) 100-10 MG/5ML syrup Take 10 mL by mouth  4 (Four) Times a Day As Needed for Cough. 240 mL 1   • ONETOUCH DELICA LANCETS FINE misc USE THREE TIMES DAILY TO TEST 100 each 3   • OneTouch Ultra test strip USE THREE TIMES DAILY 100 each 11   • sucralfate (CARAFATE) 1 g tablet Take 1 tablet by mouth 4 (Four) Times a Day. 120 tablet 11     Current Facility-Administered Medications   Medication Dose Route Frequency Provider Last Rate Last Admin   • triamcinolone acetonide (KENALOG-40) injection 80 mg  80 mg Intramuscular Once Chon Nguyễn MD            The following portions of the patient's history were reviewed and updated as appropriate: allergies, current medications, past family history, past medical history, past social history, past surgical history and problem list.        Assessment/Plan   Diagnoses and all orders for this visit:    1. Wheezing (Primary)  -     triamcinolone acetonide (KENALOG-40) injection 80 mg    2. Seasonal allergic rhinitis, unspecified trigger    3. Cough    Other orders  -     fluticasone (Flonase) 50 MCG/ACT nasal spray; 2 sprays into the nostril(s) as directed by provider Daily.  Dispense: 16 g; Refill: 11  -     cetirizine (zyrTEC) 10 MG tablet; Take 1 tablet by mouth Daily.  Dispense: 30 tablet; Refill: 11  -     guaiFENesin-dextromethorphan (ROBITUSSIN DM) 100-10 MG/5ML syrup; Take 10 mL by mouth 4 (Four) Times a Day As Needed for Cough.  Dispense: 240 mL; Refill: 1  -     benzonatate (Tessalon Perles) 100 MG capsule; Take 1 capsule by mouth 3 (Three) Times a Day As Needed for Cough.  Dispense: 60 capsule; Refill: 0    warned to watch sugars                   No follow-ups on file.                  This document has been electronically signed by Chon Nguyễn MD on April 18, 2022 12:58 CDT

## 2022-04-22 ENCOUNTER — TELEPHONE (OUTPATIENT)
Dept: FAMILY MEDICINE CLINIC | Facility: CLINIC | Age: 67
End: 2022-04-22

## 2022-04-22 RX ORDER — AZITHROMYCIN 250 MG/1
TABLET, FILM COATED ORAL
Qty: 6 TABLET | Refills: 0 | Status: SHIPPED | OUTPATIENT
Start: 2022-04-22 | End: 2022-08-29

## 2022-04-25 ENCOUNTER — TELEPHONE (OUTPATIENT)
Dept: FAMILY MEDICINE CLINIC | Facility: CLINIC | Age: 67
End: 2022-04-25

## 2022-04-25 RX ORDER — DEXTROMETHORPHAN HYDROBROMIDE AND PROMETHAZINE HYDROCHLORIDE 15; 6.25 MG/5ML; MG/5ML
5 SYRUP ORAL 4 TIMES DAILY PRN
Qty: 240 ML | Refills: 1 | Status: SHIPPED | OUTPATIENT
Start: 2022-04-25 | End: 2022-06-03

## 2022-04-25 NOTE — TELEPHONE ENCOUNTER
Incoming Refill Request      Medication requested (name and dose): Promethazine DM 6.25-15MG/5ML    Pharm[facility] where [facility] be sent[facility]    Additional details provided by patient: Patient said that it has been about a year since you prescribed the cough meds.  He said he was here earlier in the week and he is coughing      Best call back number: [phone]    Does the patient have less than a 3 day supply:  [x] Yes  [] No    Rubens Cadena Rep  04/25/22, 16:15 CDT

## 2022-05-31 RX ORDER — BENZONATATE 100 MG/1
CAPSULE ORAL
Qty: 60 CAPSULE | Refills: 0 | Status: SHIPPED | OUTPATIENT
Start: 2022-05-31 | End: 2022-09-12

## 2022-05-31 RX ORDER — CARVEDILOL 3.12 MG/1
TABLET ORAL
Qty: 180 TABLET | Refills: 2 | Status: SHIPPED | OUTPATIENT
Start: 2022-05-31 | End: 2022-10-06

## 2022-06-03 ENCOUNTER — OFFICE VISIT (OUTPATIENT)
Dept: ENDOCRINOLOGY | Facility: CLINIC | Age: 67
End: 2022-06-03

## 2022-06-03 VITALS
DIASTOLIC BLOOD PRESSURE: 64 MMHG | HEART RATE: 66 BPM | HEIGHT: 70 IN | SYSTOLIC BLOOD PRESSURE: 126 MMHG | BODY MASS INDEX: 37.41 KG/M2 | WEIGHT: 261.3 LBS | OXYGEN SATURATION: 96 %

## 2022-06-03 DIAGNOSIS — E78.2 MIXED HYPERLIPIDEMIA: ICD-10-CM

## 2022-06-03 DIAGNOSIS — E55.9 VITAMIN D DEFICIENCY: ICD-10-CM

## 2022-06-03 DIAGNOSIS — I10 ESSENTIAL HYPERTENSION: ICD-10-CM

## 2022-06-03 DIAGNOSIS — E11.65 TYPE 2 DIABETES MELLITUS WITH HYPERGLYCEMIA, WITH LONG-TERM CURRENT USE OF INSULIN: Primary | ICD-10-CM

## 2022-06-03 DIAGNOSIS — Z79.4 TYPE 2 DIABETES MELLITUS WITH HYPERGLYCEMIA, WITH LONG-TERM CURRENT USE OF INSULIN: Primary | ICD-10-CM

## 2022-06-03 DIAGNOSIS — E03.9 ACQUIRED HYPOTHYROIDISM: ICD-10-CM

## 2022-06-03 PROCEDURE — 99214 OFFICE O/P EST MOD 30 MIN: CPT | Performed by: NURSE PRACTITIONER

## 2022-06-03 PROCEDURE — 95251 CONT GLUC MNTR ANALYSIS I&R: CPT | Performed by: NURSE PRACTITIONER

## 2022-06-03 RX ORDER — FUROSEMIDE 40 MG/1
TABLET ORAL
COMMUNITY
Start: 2022-06-02 | End: 2022-08-29 | Stop reason: DRUGHIGH

## 2022-06-03 RX ORDER — LATANOPROST 50 UG/ML
1 SOLUTION/ DROPS OPHTHALMIC DAILY
COMMUNITY
Start: 2022-05-26

## 2022-06-03 NOTE — PROGRESS NOTES
"Chief Complaint  Diabetes (3 mo f/u )    Subjective          Pablo Pina presents to [facility] ENDOCRINOLOGY  History of Present Illness     In office visit     Primary Care Provider     Chon Nguyễn MD        66-year-old male presents for follow-up     Reason diabetes mellitus type 2     Timing constant     Duration diagnosed in 2010     Quality near controlled     Severity is high     Microvascular complications peripheral neuropathy     Macrovascular complications none               Current diabetes regimen--- mealtime insulin      Current glucose monitoring system              Checks 4 times daily        Britta personal use         Downloaded and reviewed      See below               Hypoglycemia  has been documented in the past    Review of Systems - General ROS: negative            Objective   Vital Signs:   /64   Pulse 66   Ht 177.8 cm (70\")   Wt 119 kg (261 lb 4.8 oz)   SpO2 96%   BMI 37.49 kg/m²     Physical Exam  Constitutional:       Appearance: Normal appearance.   Cardiovascular:      Rate and Rhythm: Regular rhythm.      Heart sounds: Normal heart sounds.   Pulmonary:      Breath sounds: Normal breath sounds.   Musculoskeletal:      Cervical back: Normal range of motion.   Neurological:      Mental Status: He is alert.        Result Review :   The following data was reviewed by: COLLEEN Carcamo on 06/03/2022:  Common labs    Common Labsle 11/1/21 11/1/21 11/1/21 11/1/21 11/1/21    0921 0921 0921 0921 1011   Glucose   151 (A)     BUN   15     Creatinine   0.91     eGFR Non African Am   84     Sodium   137     Potassium   4.4     Chloride   100     Calcium   9.6     Albumin   4.50     Total Bilirubin   0.7     Alkaline Phosphatase   54     AST (SGOT)   18     ALT (SGPT)   26     WBC 8.08       Hemoglobin 15.2       Hematocrit 45.0       Platelets 181       Total Cholesterol    145    Triglycerides    123    HDL Cholesterol    39 (A)    LDL " Cholesterol     84    Hemoglobin A1C  8.20 (A)      Microalbumin, Urine     1.3   (A) Abnormal value                      Assessment and Plan    Diagnoses and all orders for this visit:    1. Type 2 diabetes mellitus with hyperglycemia, with long-term current use of insulin (HCC) (Primary)  -     CBC & Differential; Future  -     Comprehensive Metabolic Panel; Future  -     Hemoglobin A1c; Future  -     Lipid Panel; Future  -     Microalbumin / Creatinine Urine Ratio - Urine, Clean Catch; Future  -     TSH; Future  -     Vitamin B12; Future  -     Vitamin D 25 Hydroxy; Future    2. Acquired hypothyroidism  -     CBC & Differential; Future  -     Comprehensive Metabolic Panel; Future  -     Hemoglobin A1c; Future  -     Lipid Panel; Future  -     Microalbumin / Creatinine Urine Ratio - Urine, Clean Catch; Future  -     TSH; Future  -     Vitamin B12; Future  -     Vitamin D 25 Hydroxy; Future    3. Essential hypertension  -     CBC & Differential; Future  -     Comprehensive Metabolic Panel; Future  -     Hemoglobin A1c; Future  -     Lipid Panel; Future  -     Microalbumin / Creatinine Urine Ratio - Urine, Clean Catch; Future  -     TSH; Future  -     Vitamin B12; Future  -     Vitamin D 25 Hydroxy; Future    4. Mixed hyperlipidemia  -     CBC & Differential; Future  -     Comprehensive Metabolic Panel; Future  -     Hemoglobin A1c; Future  -     Lipid Panel; Future  -     Microalbumin / Creatinine Urine Ratio - Urine, Clean Catch; Future  -     TSH; Future  -     Vitamin B12; Future  -     Vitamin D 25 Hydroxy; Future    5. Vitamin D deficiency  -     CBC & Differential; Future  -     Comprehensive Metabolic Panel; Future  -     Hemoglobin A1c; Future  -     Lipid Panel; Future  -     Microalbumin / Creatinine Urine Ratio - Urine, Clean Catch; Future  -     TSH; Future  -     Vitamin B12; Future  -     Vitamin D 25 Hydroxy; Future           Glycemic management     Diabetes mellitus type 2           Lab Results    Component Value Date     HGBA1C 8.20 (H) 11/01/2021            Britta personal use         Downloaded and reviewed      Dated from May 21 to Evie 3, 2022      Average BG -180     Target range - 58 %      High 36%      Very high -  6%      Low 0 %      Very low 0 %        Hyperglycemia overnight   And postprandial hyperglycemia     Adjust mealtime and basal         Ozempic was stopped due to nausea     Metformin stopped due to diarrhea            Levemir  taking 21 units  --increase to 24 units            Taking Humalog 20 up to 30 units 3 times a day with meals     He is adjusting his insulin based on CGM               Lipid Management     Taking Lipitor 40 mg 1 at night        Blood Pressure Management:          Taking coreg 3.125 mg BID         Microvascular Complication Monitoring:       Last eye exam --  May 2022, no DR      Cataract bilateral  -----------     Last Microalbumin-Proteinuria Assessment     Lab Results   Component Value Date    MICROALBUR 1.3 11/01/2021            Neuropathy yes  -----------           Weight Related:     Body mass index is 37.49 kg/m².              Bone Health     Vitamin D deficiency     Taking vitamin D     Thyroid Health     Hypothyroidism taking levothyroxine 150 mcg daily     Lab Results   Component Value Date    TSH 1.470 11/01/2021             Other Diabetes Related Aspects                   Lab Results   Component Value Date     [id number] 05/05/2021                                Follow Up   Return in about 3 months (around 9/3/2022) for Recheck.  Patient was given instructions and counseling regarding his condition or for health maintenance advice. Please see specific information pulled into the AVS if appropriate.         This document has been electronically signed by COLLEEN Carcamo on Evie 3, 2022 09:38 CDT.

## 2022-06-20 RX ORDER — LEVOTHYROXINE SODIUM 0.15 MG/1
TABLET ORAL
Qty: 90 TABLET | Refills: 0 | Status: SHIPPED | OUTPATIENT
Start: 2022-06-20 | End: 2022-08-30 | Stop reason: SDUPTHER

## 2022-07-12 ENCOUNTER — TELEPHONE (OUTPATIENT)
Dept: FAMILY MEDICINE CLINIC | Facility: CLINIC | Age: 67
End: 2022-07-12

## 2022-07-12 RX ORDER — ATORVASTATIN CALCIUM 40 MG/1
TABLET, FILM COATED ORAL
Qty: 90 TABLET | Refills: 1 | Status: SHIPPED | OUTPATIENT
Start: 2022-07-12 | End: 2023-01-03

## 2022-07-12 NOTE — TELEPHONE ENCOUNTER
Mrs. Pina called and said that Mr. Pina dropped his glucose monitor in the water, she called the insurance and they told her if you would write an order and put urgent on it they would cover it and get it within a couple of days.  He uses a Free Style [employer] [place].  [facility]

## 2022-07-15 ENCOUNTER — TELEPHONE (OUTPATIENT)
Dept: FAMILY MEDICINE CLINIC | Facility: CLINIC | Age: 67
End: 2022-07-15

## 2022-07-15 NOTE — TELEPHONE ENCOUNTER
Mrs. Pina called and wanted to know if you could write a script for a Free Style [employer] [place] and send it to [facility] the other one we sent to [facility] they would not cover it.

## 2022-08-02 ENCOUNTER — TELEPHONE (OUTPATIENT)
Dept: ENDOCRINOLOGY | Facility: CLINIC | Age: 67
End: 2022-08-02

## 2022-08-02 NOTE — TELEPHONE ENCOUNTER
Pt called regarding needing paperwork for a new pump. He said he was waiting for the newest pump that was coming out from [employer] and wanted to know how to go about getting that started.    Please advise    Thanks

## 2022-08-03 ENCOUNTER — TELEPHONE (OUTPATIENT)
Dept: FAMILY MEDICINE CLINIC | Facility: CLINIC | Age: 67
End: 2022-08-03

## 2022-08-03 RX ORDER — PERMETHRIN 50 MG/G
CREAM TOPICAL
Qty: 1 EACH | Refills: 0 | Status: SHIPPED | OUTPATIENT
Start: 2022-08-03 | End: 2022-08-29

## 2022-08-03 RX ORDER — HYDROXYZINE PAMOATE 25 MG/1
25 CAPSULE ORAL EVERY 6 HOURS PRN
Qty: 60 CAPSULE | Refills: 1 | Status: SHIPPED | OUTPATIENT
Start: 2022-08-03 | End: 2022-08-10

## 2022-08-10 RX ORDER — HYDROXYZINE PAMOATE 25 MG/1
25 CAPSULE ORAL EVERY 6 HOURS PRN
Qty: 60 CAPSULE | Refills: 1 | Status: SHIPPED | OUTPATIENT
Start: 2022-08-10 | End: 2022-08-22

## 2022-08-22 RX ORDER — HYDROXYZINE PAMOATE 25 MG/1
CAPSULE ORAL
Qty: 60 CAPSULE | Refills: 1 | Status: SHIPPED | OUTPATIENT
Start: 2022-08-22 | End: 2022-09-06

## 2022-08-22 RX ORDER — LEVOTHYROXINE SODIUM 0.15 MG/1
TABLET ORAL
Qty: 90 TABLET | Refills: 0 | OUTPATIENT
Start: 2022-08-22

## 2022-08-29 ENCOUNTER — LAB (OUTPATIENT)
Dept: LAB | Facility: HOSPITAL | Age: 67
End: 2022-08-29

## 2022-08-29 ENCOUNTER — OFFICE VISIT (OUTPATIENT)
Dept: FAMILY MEDICINE CLINIC | Facility: CLINIC | Age: 67
End: 2022-08-29

## 2022-08-29 VITALS
OXYGEN SATURATION: 98 % | WEIGHT: 263 LBS | HEIGHT: 70 IN | SYSTOLIC BLOOD PRESSURE: 131 MMHG | TEMPERATURE: 98.2 F | BODY MASS INDEX: 37.65 KG/M2 | DIASTOLIC BLOOD PRESSURE: 77 MMHG | HEART RATE: 59 BPM

## 2022-08-29 DIAGNOSIS — I25.10 CORONARY ARTERIOSCLEROSIS: ICD-10-CM

## 2022-08-29 DIAGNOSIS — E03.9 ACQUIRED HYPOTHYROIDISM: ICD-10-CM

## 2022-08-29 DIAGNOSIS — Z00.00 MEDICARE ANNUAL WELLNESS VISIT, INITIAL: Primary | ICD-10-CM

## 2022-08-29 DIAGNOSIS — Z12.5 SCREENING FOR MALIGNANT NEOPLASM OF PROSTATE: ICD-10-CM

## 2022-08-29 PROCEDURE — 84443 ASSAY THYROID STIM HORMONE: CPT | Performed by: FAMILY MEDICINE

## 2022-08-29 PROCEDURE — G0103 PSA SCREENING: HCPCS | Performed by: FAMILY MEDICINE

## 2022-08-29 PROCEDURE — 99213 OFFICE O/P EST LOW 20 MIN: CPT | Performed by: FAMILY MEDICINE

## 2022-08-29 PROCEDURE — 1159F MED LIST DOCD IN RCRD: CPT | Performed by: FAMILY MEDICINE

## 2022-08-29 PROCEDURE — 1126F AMNT PAIN NOTED NONE PRSNT: CPT | Performed by: FAMILY MEDICINE

## 2022-08-29 PROCEDURE — 1170F FXNL STATUS ASSESSED: CPT | Performed by: FAMILY MEDICINE

## 2022-08-29 PROCEDURE — G0438 PPPS, INITIAL VISIT: HCPCS | Performed by: FAMILY MEDICINE

## 2022-08-29 RX ORDER — FUROSEMIDE 20 MG/1
TABLET ORAL
COMMUNITY
Start: 2022-08-28

## 2022-08-29 RX ORDER — CLOPIDOGREL BISULFATE 75 MG/1
75 TABLET ORAL DAILY
Qty: 90 TABLET | Refills: 3 | Status: SHIPPED | OUTPATIENT
Start: 2022-08-29

## 2022-08-29 RX ORDER — PERMETHRIN 50 MG/G
CREAM TOPICAL
Qty: 1 EACH | Refills: 5 | Status: SHIPPED | OUTPATIENT
Start: 2022-08-29

## 2022-08-29 NOTE — PROGRESS NOTES
The ABCs of the Annual Wellness Visit  Initial Medicare Wellness Visit    Chief Complaint   Patient presents with   • Hypothyroidism   • Med Refill     Subjective   History of Present Illness:  Pablo Pnia is a 66 y.o. male who presents for an Initial Medicare Wellness Visit.    The following portions of the patient's history were reviewed and   updated as appropriate: allergies, current medications, past family history, past medical history, past social history, past surgical history and problem list.     Compared to one year ago, the patient feels his physical   health is the same.    Compared to one year ago, the patient feels his mental   health is the same.    Recent Hospitalizations:  He was not admitted to the hospital during the last year.       Current Medical Providers:  Patient Care Team:  Chon Nguyễn MD as PCP - General    Outpatient Medications Prior to Visit   Medication Sig Dispense Refill   • albuterol sulfate  (90 Base) MCG/ACT inhaler Inhale 2 puffs Every 4 (Four) Hours As Needed for Wheezing. 1 inhaler 11   • aspirin 81 MG chewable tablet Chew 81 mg Every Night.     • atorvastatin (LIPITOR) 40 MG tablet TAKE 1 TABLET BY MOUTH EVERY DAY 90 tablet 1   • carvedilol (COREG) 3.125 MG tablet TAKE 1 TABLET BY MOUTH TWICE A DAY WITH MEALS 180 tablet 2   • cetirizine (zyrTEC) 10 MG tablet Take 1 tablet by mouth Daily. 30 tablet 11   • clopidogrel (PLAVIX) 75 MG tablet Take 75 mg by mouth Daily.     • fluticasone (Flonase) 50 MCG/ACT nasal spray 2 sprays into the nostril(s) as directed by provider Daily. 16 g 11   • hydrOXYzine pamoate (VISTARIL) 25 MG capsule TAKE 1 CAPSULE BY MOUTH EVERY 6 HOURS AS NEEDED FOR ITCHING. 60 capsule 1   • insulin aspart (NovoLOG FlexPen) 100 UNIT/ML solution pen-injector sc pen Inject 15 Units under the skin into the appropriate area as directed 3 (Three) Times a Day With Meals. 6 pen 3   • insulin detemir (Levemir FlexTouch) 100 UNIT/ML injection Inject  20 Units under the skin into the appropriate area as directed Daily. 2 pen 11   • latanoprost (XALATAN) 0.005 % ophthalmic solution Administer 1 drop to both eyes Daily.     • levothyroxine (SYNTHROID, LEVOTHROID) 150 MCG tablet TAKE 1 TABLET BY MOUTH EVERY DAY BEFORE BREAKFAST 90 tablet 0   • lisinopril (PRINIVIL,ZESTRIL) 20 MG tablet Take 1 tablet by mouth Daily. 90 tablet 3   • nitroglycerin (NITROSTAT) 0.4 MG SL tablet DISSOLVE 1 TABLET BY MOUTH UNDER THE TONGUE AS NEEDED FOR CHEST PAIN, DO NOT EXCEED MORE THAN 3 DOSES IN 15 MINUTES 25 tablet 11   • furosemide (LASIX) 40 MG tablet      • Alcohol Swabs 70 % pads 1 each 2 (Two) Times a Day. 100 each 11   • B-D UF III MINI PEN NEEDLES 31G X 5 MM misc USE FOUR TIMES DAILY 120 each 11   • benzonatate (TESSALON) 100 MG capsule TAKE 1 CAPSULE BY MOUTH 3 TIMES A DAY AS NEEDED FOR COUGH. 60 capsule 0   • Continuous Blood Gluc  (FreeStyle Britta 2 [place]) device 1 each Take As Directed. 28 each 11   • Continuous Blood Gluc  (FreeStyle Britta 2 [place]) device 1 each Daily. URGENT 1 each 11   • Continuous Blood Gluc Sensor (FreeStyle Britta 14 Day Sensor) misc Inject 1 each under the skin into the appropriate area as directed Every 14 (Fourteen) Days. 13 each 3   • Continuous Blood Gluc Sensor (FreeStyle Britta 2 Sensor) misc 1 each Take As Directed. 28 each 11   • furosemide (LASIX) 20 MG tablet      • glucose blood test strip      • ONETOUCH DELICA LANCETS FINE misc USE THREE TIMES DAILY TO TEST 100 each 3   • OneTouch Ultra test strip USE THREE TIMES DAILY 100 each 11   • azithromycin (Zithromax Z-Zohaib) 250 MG tablet Take 2 tablets the first day, then 1 tablet daily for 4 days. 6 tablet 0   • permethrin (ELIMITE) 5 % cream Apply to body starting above most superior rash, apply down to toes covering all skin.  Leave on 12 hours, wash off in tub or shower. 1 each 0   • raNITIdine (ZANTAC) 150 MG tablet Take 1 tablet by mouth 2 (Two) Times a Day. 180 tablet 3   •  "sucralfate (CARAFATE) 1 g tablet Take 1 tablet by mouth 4 (Four) Times a Day. 120 tablet 11     No facility-administered medications prior to visit.       No opioid medication identified on active medication list. I have reviewed chart for other potential  high risk medication/s and harmful drug interactions in the elderly.          Aspirin is on active medication list. Aspirin use is indicated based on review of current medical condition/s. Pros and cons of this therapy have been discussed today. Benefits of this medication outweigh potential harm.  Patient has been encouraged to continue taking this medication.  .      Patient Active Problem List   Diagnosis   • Acquired hypothyroidism   • Type 2 diabetes mellitus with hyperglycemia, with long-term current use of insulin (HCC)   • Coronary arteriosclerosis   • Chest pain   • Elevated troponin   • HLD (hyperlipidemia)   • Essential hypertension   • Elevated troponin I level     Advance Care Planning  Advance Directive is not on file.  ACP discussion was held with the patient during this visit. Patient does not have an advance directive, information provided.    Review of Systems   Constitutional: Negative.    HENT: Negative.    Eyes: Negative.    Respiratory: Negative.    Cardiovascular: Negative.    Gastrointestinal: Negative.    Endocrine: Negative.    Genitourinary: Negative.    Musculoskeletal: Negative.    Skin: Negative.    Allergic/Immunologic: Negative.    Neurological: Negative.    Hematological: Negative.    Psychiatric/Behavioral: Negative.         Objective       Vitals:    08/29/22 0958   BP: 131/77   BP Location: Left arm   Patient Position: Sitting   Cuff Size: Adult   Pulse: 59   Temp: 98.2 °F (36.8 °C)   TempSrc: Infrared   SpO2: 98%   Weight: 119 kg (263 lb)   Height: 177.8 cm (70\")   PainSc: 0-No pain     Estimated body mass index is 37.74 kg/m² as calculated from the following:    Height as of this encounter: 177.8 cm (70\").    Weight as of this " encounter: 119 kg (263 lb).    Class 2 Severe Obesity (BMI >=35 and <=39.9). Obesity-related health conditions include the following: hypertension, coronary heart disease and diabetes mellitus. Obesity is unchanged. BMI is is above average; BMI management plan is completed. We discussed portion control and increasing exercise.      Does the patient have evidence of cognitive impairment? No    Physical Exam  Vitals and nursing note reviewed.   Constitutional:       Appearance: Normal appearance. He is well-developed.   HENT:      Head: Normocephalic and atraumatic.      Right Ear: External ear normal.      Left Ear: External ear normal.      Nose: Nose normal. No rhinorrhea.   Eyes:      General: No scleral icterus.     Extraocular Movements: Extraocular movements intact.      Conjunctiva/sclera: Conjunctivae normal.      Pupils: Pupils are equal, round, and reactive to light.   Cardiovascular:      Rate and Rhythm: Normal rate and regular rhythm.      Heart sounds: Murmur heard.     No friction rub. No gallop.      Comments: [facility] 2-3/6 (has had since a child, cardiologist aware)   Pulmonary:      Effort: Pulmonary effort is normal.      Breath sounds: Normal breath sounds.   Abdominal:      General: Bowel sounds are normal. There is no distension.      Palpations: Abdomen is soft.      Tenderness: There is no abdominal tenderness.   Musculoskeletal:         General: No deformity. Normal range of motion.      Cervical back: Normal range of motion and neck supple.   Skin:     General: Skin is warm and dry.      Findings: No erythema or rash.   Neurological:      Mental Status: He is alert and oriented to person, place, and time.      Cranial Nerves: No cranial nerve deficit.   Psychiatric:         Behavior: Behavior normal.         Thought Content: Thought content normal.         Judgment: Judgment normal.               HEALTH RISK ASSESSMENT    Smoking Status:  Social History     Tobacco Use   Smoking Status Former  Smoker   • Packs/day: 1.00   • Years: 31.00   • Pack years: 31.00   • Start date:    • Quit date:    • Years since quittin.6   Smokeless Tobacco Never Used   Tobacco Comment    Quit around      Alcohol Consumption:  Social History     Substance and Sexual Activity   Alcohol Use No     Fall Risk Screen:    ALAINA Fall Risk Assessment was completed, and patient is at LOW risk for falls.Assessment completed on:2022    Depression Screen:   PHQ-2/PHQ-9 Depression Screening 2022   Retired Total Score -   Little Interest or Pleasure in Doing Things 0-->not at all   Feeling Down, Depressed or Hopeless 0-->not at all   PHQ-9: Brief Depression Severity Measure Score 0       Health Habits and Functional and Cognitive Screening:  Functional & Cognitive Status 2022   Do you have difficulty preparing food and eating? No   Do you have difficulty bathing yourself, getting dressed or grooming yourself? No   Do you have difficulty using the toilet? No   Do you have difficulty moving around from place to place? No   Do you have trouble with steps or getting out of a bed or a chair? No   Current Diet Limited Junk Food   Dental Exam Not up to date   Eye Exam Up to date   Exercise (times per week) 7 times per week   Current Exercises Include Yard Work   Do you need help using the phone?  No   Are you deaf or do you have serious difficulty hearing?  No   Do you need help with transportation? No   Do you need help shopping? No   Do you need help preparing meals?  No   Do you need help with housework?  No   Do you need help with laundry? No   Do you need help taking your medications? No   Do you need help managing money? No   Do you ever drive or ride in a car without wearing a seat belt? Yes   Have you felt unusual stress, anger or loneliness in the last month? No   Who do you live with? Spouse   If you need help, do you have trouble finding someone available to you? No   Have you been bothered in the last  four weeks by sexual problems? No   Do you have difficulty concentrating, remembering or making decisions? No       Age-appropriate Screening Schedule:  Refer to the list below for future screening recommendations based on patient's age, sex and/or medical conditions. Orders for these recommended tests are listed in the plan section. The patient has been provided with a written plan.    Health Maintenance   Topic Date Due   • HEMOGLOBIN A1C  05/01/2022   • DIABETIC FOOT EXAM  09/13/2022 (Originally 9/6/2016)   • DIABETIC EYE EXAM  10/17/2022 (Originally 11/2/2021)   • ZOSTER VACCINE (2 of 2) 04/18/2023 (Originally 1/8/2018)   • INFLUENZA VACCINE  10/01/2022   • LIPID PANEL  11/01/2022   • URINE MICROALBUMIN  11/01/2022   • TDAP/TD VACCINES (2 - Td or Tdap) 05/22/2027            Assessment & Plan   CMS Preventative Services Quick Reference  Risk Factors Identified During Encounter  Cardiovascular Disease  Polypharmacy  The above risks/problems have been discussed with the patient.  Follow up actions/plans if indicated are seen below in the Assessment/Plan Section.  Pertinent information has been shared with the patient in the After Visit Summary.    Diagnoses and all orders for this visit:    1. Medicare annual wellness visit, initial (Primary)  -     PSA Screen  -     TSH    2. Acquired hypothyroidism  -     TSH    3. Screening for malignant neoplasm of prostate  -     PSA Screen    4. Coronary arteriosclerosis    Other orders  -     permethrin (ELIMITE) 5 % cream; Apply to body starting above most superior rash, apply down to toes covering all skin.  Leave on 12 hours, wash off in tub or shower.  Dispense: 1 each; Refill: 5  -     clopidogrel (PLAVIX) 75 MG tablet; Take 1 tablet by mouth Daily.  Dispense: 90 tablet; Refill: 3        Follow Up:  No follow-ups on file.     An After Visit Summary and PPPS were made available to the patient.                 Refilled elimite since turkey mites in the woods a  lot.  Refilled plavix, history of cabg    Refill synthroid after tsh.  He said im watching his thyroid?  león had an order in for his tsh.  Ill be glad to monitor.

## 2022-08-30 LAB
PSA SERPL-MCNC: 0.36 NG/ML (ref 0–4)
TSH SERPL DL<=0.05 MIU/L-ACNC: 1.2 UIU/ML (ref 0.27–4.2)

## 2022-08-30 RX ORDER — LEVOTHYROXINE SODIUM 0.15 MG/1
150 TABLET ORAL
Qty: 90 TABLET | Refills: 1 | Status: SHIPPED | OUTPATIENT
Start: 2022-08-30 | End: 2023-01-23

## 2022-09-06 RX ORDER — HYDROXYZINE PAMOATE 25 MG/1
CAPSULE ORAL
Qty: 60 CAPSULE | Refills: 1 | Status: SHIPPED | OUTPATIENT
Start: 2022-09-06

## 2022-09-07 RX ORDER — INSULIN ASPART 100 [IU]/ML
INJECTION, SOLUTION INTRAVENOUS; SUBCUTANEOUS
Qty: 15 ML | Refills: 3 | Status: SHIPPED | OUTPATIENT
Start: 2022-09-07 | End: 2022-09-12 | Stop reason: SDUPTHER

## 2022-09-12 ENCOUNTER — OFFICE VISIT (OUTPATIENT)
Dept: ENDOCRINOLOGY | Facility: CLINIC | Age: 67
End: 2022-09-12

## 2022-09-12 VITALS
SYSTOLIC BLOOD PRESSURE: 132 MMHG | WEIGHT: 264.2 LBS | BODY MASS INDEX: 37.82 KG/M2 | DIASTOLIC BLOOD PRESSURE: 78 MMHG | OXYGEN SATURATION: 99 % | HEIGHT: 70 IN | RESPIRATION RATE: 18 BRPM | HEART RATE: 78 BPM

## 2022-09-12 DIAGNOSIS — E03.9 ACQUIRED HYPOTHYROIDISM: ICD-10-CM

## 2022-09-12 DIAGNOSIS — E78.2 MIXED HYPERLIPIDEMIA: ICD-10-CM

## 2022-09-12 DIAGNOSIS — I10 ESSENTIAL HYPERTENSION: ICD-10-CM

## 2022-09-12 DIAGNOSIS — E55.9 VITAMIN D DEFICIENCY, UNSPECIFIED: ICD-10-CM

## 2022-09-12 DIAGNOSIS — Z79.4 TYPE 2 DIABETES MELLITUS WITH HYPERGLYCEMIA, WITH LONG-TERM CURRENT USE OF INSULIN: Primary | ICD-10-CM

## 2022-09-12 DIAGNOSIS — E11.65 TYPE 2 DIABETES MELLITUS WITH HYPERGLYCEMIA, WITH LONG-TERM CURRENT USE OF INSULIN: Primary | ICD-10-CM

## 2022-09-12 PROCEDURE — 99214 OFFICE O/P EST MOD 30 MIN: CPT | Performed by: NURSE PRACTITIONER

## 2022-09-12 PROCEDURE — 95251 CONT GLUC MNTR ANALYSIS I&R: CPT | Performed by: NURSE PRACTITIONER

## 2022-09-12 RX ORDER — INSULIN ASPART 100 [IU]/ML
40 INJECTION, SOLUTION INTRAVENOUS; SUBCUTANEOUS
Qty: 45 ML | Refills: 11 | Status: SHIPPED | OUTPATIENT
Start: 2022-09-12 | End: 2023-01-03 | Stop reason: SDUPTHER

## 2022-09-12 NOTE — PROGRESS NOTES
"Chief Complaint  Follow-up and Diabetes (3 mo)    Subjective          Pablo Pina presents to [facility] ENDOCRINOLOGY  History of Present Illness       In office visit     Primary Care Provider     Chon Nguyễn MD        66-year-old male presents for follow-up     Reason diabetes mellitus type 2     Timing constant     Duration diagnosed in 2010     Quality near controlled     Severity is high     Microvascular complications peripheral neuropathy     Macrovascular complications none               Current diabetes regimen--- mealtime insulin      Current glucose monitoring system              Checks 4 times daily        Britta personal use           see below       Review of Systems - General ROS: negative              Objective   Vital Signs:   /78   Pulse 78   Resp 18   Ht 177.8 cm (70\")   Wt 120 kg (264 lb 3.2 oz)   SpO2 99%   BMI 37.91 kg/m²     Physical Exam  Constitutional:       Appearance: Normal appearance.   Cardiovascular:      Rate and Rhythm: Regular rhythm.      Heart sounds: Normal heart sounds.   Pulmonary:      Breath sounds: Normal breath sounds.   Musculoskeletal:      Cervical back: Normal range of motion.   Neurological:      Mental Status: He is alert.        Result Review :   The following data was reviewed by: COLLEEN Carcamo on 06/03/2022:  Common labs    Common Labsle 11/1/21 11/1/21 11/1/21 11/1/21 11/1/21 8/29/22    0921 0921 0921 0921 1011    Glucose   151 (A)      BUN   15      Creatinine   0.91      eGFR Non African Am   84      Sodium   137      Potassium   4.4      Chloride   100      Calcium   9.6      Albumin   4.50      Total Bilirubin   0.7      Alkaline Phosphatase   54      AST (SGOT)   18      ALT (SGPT)   26      WBC 8.08        Hemoglobin 15.2        Hematocrit 45.0        Platelets 181        Total Cholesterol    145     Triglycerides    123     HDL Cholesterol    39 (A)     LDL Cholesterol     84     Hemoglobin " A1C  8.20 (A)       Microalbumin, Urine     1.3    PSA      0.364   (A) Abnormal value                        Assessment and Plan    Diagnoses and all orders for this visit:    1. Type 2 diabetes mellitus with hyperglycemia, with long-term current use of insulin (HCC) (Primary)  -     CBC & Differential; Future  -     Comprehensive Metabolic Panel; Future  -     Hemoglobin A1c; Future  -     Lipid Panel; Future  -     Microalbumin / Creatinine Urine Ratio - Urine, Clean Catch; Future  -     TSH; Future  -     Vitamin D 25 Hydroxy; Future    2. Acquired hypothyroidism  -     CBC & Differential; Future  -     Comprehensive Metabolic Panel; Future  -     Hemoglobin A1c; Future  -     Lipid Panel; Future  -     Microalbumin / Creatinine Urine Ratio - Urine, Clean Catch; Future  -     TSH; Future  -     Vitamin D 25 Hydroxy; Future    3. Essential hypertension  -     CBC & Differential; Future  -     Comprehensive Metabolic Panel; Future  -     Hemoglobin A1c; Future  -     Lipid Panel; Future  -     Microalbumin / Creatinine Urine Ratio - Urine, Clean Catch; Future  -     TSH; Future  -     Vitamin D 25 Hydroxy; Future    4. Mixed hyperlipidemia  -     CBC & Differential; Future  -     Comprehensive Metabolic Panel; Future  -     Hemoglobin A1c; Future  -     Lipid Panel; Future  -     Microalbumin / Creatinine Urine Ratio - Urine, Clean Catch; Future  -     TSH; Future  -     Vitamin D 25 Hydroxy; Future    5. Vitamin D deficiency, unspecified   -     Vitamin D 25 Hydroxy; Future    Other orders  -     insulin aspart (NovoLOG FlexPen) 100 UNIT/ML solution pen-injector sc pen; Inject 40 Units under the skin into the appropriate area as directed 3 (Three) Times a Day With Meals.  Dispense: 45 mL; Refill: 11           Glycemic management     Diabetes mellitus type 2       Lab Results   Component Value Date    HGBA1C 8.20 (H) 11/01/2021             Britta personal use         Downloaded and reviewed     Dated from August 30  to Sept 12, 2022     Average bg 189     Time in target 48%     High 45%     Very high 7%     Low 0 %     Very low 0 %     Postprandial hyperglycemia --adjust  mealtime insulin             Ozempic was stopped due to nausea     Metformin stopped due to diarrhea            Levemir  taking  24 units            Taking Humalog 20 up to 30 units 3 times a day with meals-- increase to 34 units      He is adjusting his insulin based on CGM               Lipid Management     Taking Lipitor 40 mg 1 at night        Blood Pressure Management:          Taking coreg 3.125 mg BID         Microvascular Complication Monitoring:       Last eye exam --  May 2022, no DR      Cataract bilateral  -----------     Last Microalbumin-Proteinuria Assessment     Lab Results   Component Value Date    MICROALBUR 1.3 11/01/2021            Neuropathy yes  -----------           Weight Related:     Body mass index is 37.91 kg/m².              Bone Health     Vitamin D deficiency     Taking vitamin D     Thyroid Health     Hypothyroidism taking levothyroxine 150 mcg daily     Lab Results   Component Value Date    TSH 1.200 08/29/2022             Other Diabetes Related Aspects                   Lab Results   Component Value Date     [id number] 389 05/05/2021                                Follow Up   Return in about 4 months (around 1/12/2023) for Recheck.  Patient was given instructions and counseling regarding his condition or for health maintenance advice. Please see specific information pulled into the AVS if appropriate.         This document has been electronically signed by COLLEEN Carcamo on September 12, 2022 09:36 CDT.

## 2022-10-06 RX ORDER — CARVEDILOL 3.12 MG/1
TABLET ORAL
Qty: 180 TABLET | Refills: 2 | Status: SHIPPED | OUTPATIENT
Start: 2022-10-06

## 2022-10-30 DIAGNOSIS — E11.65 TYPE 2 DIABETES MELLITUS WITH HYPERGLYCEMIA, WITH LONG-TERM CURRENT USE OF INSULIN: ICD-10-CM

## 2022-10-30 DIAGNOSIS — Z79.4 TYPE 2 DIABETES MELLITUS WITH HYPERGLYCEMIA, WITH LONG-TERM CURRENT USE OF INSULIN: ICD-10-CM

## 2022-11-09 ENCOUNTER — OFFICE VISIT (OUTPATIENT)
Dept: FAMILY MEDICINE CLINIC | Facility: CLINIC | Age: 67
End: 2022-11-09

## 2022-11-09 VITALS
HEART RATE: 85 BPM | BODY MASS INDEX: 37.22 KG/M2 | HEIGHT: 70 IN | TEMPERATURE: 97.8 F | WEIGHT: 260 LBS | SYSTOLIC BLOOD PRESSURE: 140 MMHG | OXYGEN SATURATION: 97 % | DIASTOLIC BLOOD PRESSURE: 76 MMHG

## 2022-11-09 DIAGNOSIS — K12.0 APHTHOUS ULCER OF MOUTH: Primary | ICD-10-CM

## 2022-11-09 PROCEDURE — 99213 OFFICE O/P EST LOW 20 MIN: CPT | Performed by: FAMILY MEDICINE

## 2022-11-09 RX ORDER — LIDOCAINE HYDROCHLORIDE 20 MG/ML
5 SOLUTION OROPHARYNGEAL AS NEEDED
Qty: 100 ML | Refills: 0 | Status: SHIPPED | OUTPATIENT
Start: 2022-11-09

## 2022-11-09 NOTE — PROGRESS NOTES
" Subjective   Pablo Pina is a 66 y.o. male.     Chief Complaint   Patient presents with   • Oral Pain             History of Present Illness     3 days, sore places on gums.  Has not teeth.  Has rinsed mouth with salt water.   Also  Feels a little bad, left ear hurts a little.     Review of Systems   Constitutional: Negative for chills, fatigue and fever.   HENT: Positive for ear pain. Negative for congestion, ear discharge, facial swelling, hearing loss, postnasal drip, rhinorrhea, sinus pressure, sore throat, trouble swallowing and voice change.    Eyes: Negative for discharge, redness and visual disturbance.   Respiratory: Negative for cough, chest tightness, shortness of breath and wheezing.    Cardiovascular: Negative for chest pain and palpitations.   Gastrointestinal: Negative for abdominal pain, blood in stool, constipation, diarrhea, nausea and vomiting.   Endocrine: Negative for polydipsia and polyuria.   Genitourinary: Negative for dysuria, flank pain, hematuria and urgency.   Musculoskeletal: Negative for arthralgias, back pain, joint swelling and myalgias.   Skin: Negative for rash.   Neurological: Negative for dizziness, weakness, numbness and headaches.   Hematological: Negative for adenopathy.   Psychiatric/Behavioral: Negative for confusion and sleep disturbance. The patient is not nervous/anxious.            /76 (BP Location: Left arm, Patient Position: Sitting, Cuff Size: Adult)   Pulse 85   Temp 97.8 °F (36.6 °C) (Infrared)   Ht 177.8 cm (70\")   Wt 118 kg (260 lb)   SpO2 97%   BMI 37.31 kg/m²       Objective     Physical Exam  Vitals and nursing note reviewed.   Constitutional:       Appearance: Normal appearance. He is well-developed.   HENT:      Head: Normocephalic and atraumatic.      Right Ear: Tympanic membrane, ear canal and external ear normal.      Left Ear: Tympanic membrane, ear canal and external ear normal.      Nose: Nose normal.      Mouth/Throat:      Mouth: " Mucous membranes are moist.      Comments: Few shallow gray ulcers vestibule of mouth, lower and upper.  Tongue has white coating.   Eyes:      Extraocular Movements: Extraocular movements intact.      Conjunctiva/sclera: Conjunctivae normal.      Pupils: Pupils are equal, round, and reactive to light.   Pulmonary:      Effort: Pulmonary effort is normal.   Musculoskeletal:         General: Normal range of motion.      Cervical back: Normal range of motion.   Neurological:      General: No focal deficit present.      Mental Status: He is alert and oriented to person, place, and time.   Psychiatric:         Behavior: Behavior normal.         Thought Content: Thought content normal.         Judgment: Judgment normal.             PAST MEDICAL HISTORY     Past Medical History:   Diagnosis Date   • Acquired hypothyroidism    • Acute bronchitis    • Arm pain     Pain in right arm     • Borderline glaucoma    • Clouded consciousness     wooziness when getting up from laying or sitting position    • Coronary arteriosclerosis    • Encounter for immunization    • Essential hypertension    • Fatigue    • Feces, abnormal     Other fecal abnormalities   • Gastroesophageal reflux disease    • Hypercholesterolemia    • Hyperlipidemia    • Hypermetropia    • Hypertensive disorder    • Impacted cerumen    • Impaired glucose tolerance    • Impotence    • Need for immunization against influenza    • Obstructive sleep apnea syndrome    • Overweight    • Patient noncompliance     with other medical treatment and regimen    • Patient noncompliance, general    • Presbyopia    • Sacroiliac joint pain    • Sensorineural hearing loss of both ears    • Skin tag    • Type 2 diabetes mellitus (HCC)       PAST SURGICAL HISTORY     Past Surgical History:   Procedure Laterality Date   • CARDIAC CATHETERIZATION N/A 10/9/2018    Procedure: Left Heart Cath;  Surgeon: Lit Rodriguez MD;  Location: [facility] CATH INVASIVE LOCATION;  Service: Cardiology    • CARDIAC CATHETERIZATION N/A 2019    Procedure: Left Heart Cath;  Surgeon: Lit Rodriguez MD;  Location: [facility] CATH INVASIVE LOCATION;  Service: Cardiology   • CORONARY ARTERY BYPASS GRAFT  2005    CABG (1) - LIMA to LAD, SVG to D1, SVG to OM,SVG to PDA.   • INJECTION OF MEDICATION  10/01/2015    Kenalog (1) - OBI Nguyễn   • OTHER SURGICAL HISTORY      Coronary artery stent placement (1)   • OTHER SURGICAL HISTORY  2012    Remove Impacted Cerumen 73735 (1) - OLYA Hammond   • OTHER SURGICAL HISTORY  2014    Skin Tag Removal (Each Additional 10 66107 (1) - OBI Nguyễn   • OTHER SURGICAL HISTORY  10/01/2015    Nebulizer Treatment 74717 (1) - OBI Nguyễn      SOCIAL HISTORY     Social History     Socioeconomic History   • Marital status:    Tobacco Use   • Smoking status: Former     Packs/day: 1.00     Years: 31.00     Pack years: 31.00     Types: Cigarettes     Start date:      Quit date:      Years since quittin.8   • Smokeless tobacco: Never   • Tobacco comments:     Quit around    Substance and Sexual Activity   • Alcohol use: No   • Drug use: No   • Sexual activity: Never      ALLERGIES   Patient has no known allergies.   MEDICATIONS     Current Outpatient Medications   Medication Sig Dispense Refill   • albuterol sulfate  (90 Base) MCG/ACT inhaler Inhale 2 puffs Every 4 (Four) Hours As Needed for Wheezing. 1 inhaler 11   • Alcohol Swabs 70 % pads 1 each 2 (Two) Times a Day. 100 each 11   • aspirin 81 MG chewable tablet Chew 81 mg Every Night.     • atorvastatin (LIPITOR) 40 MG tablet TAKE 1 TABLET BY MOUTH EVERY DAY 90 tablet 1   • B-D UF III MINI PEN NEEDLES 31G X 5 MM misc USE FOUR TIMES DAILY 120 each 11   • carvedilol (COREG) 3.125 MG tablet TAKE 1 TABLET BY MOUTH TWICE A DAY WITH MEALS 180 tablet 2   • cetirizine (zyrTEC) 10 MG tablet Take 1 tablet by mouth Daily. 30 tablet 11   • clopidogrel (PLAVIX) 75 MG tablet Take 1 tablet by mouth Daily. 90 tablet 3   •  Continuous Blood Gluc  (FreeStyle Britta 2 [place]) device 1 each Take As Directed. 28 each 11   • Continuous Blood Gluc  (FreeStyle Britta 2 [place]) device 1 each Daily. URGENT 1 each 11   • Continuous Blood Gluc Sensor (FreeStyle Britta 14 Day Sensor) misc Inject 1 each under the skin into the appropriate area as directed Every 14 (Fourteen) Days. 13 each 3   • Continuous Blood Gluc Sensor (FreeStyle Britta 2 Sensor) misc USE AS DIRECTED. 2 each 11   • fluticasone (Flonase) 50 MCG/ACT nasal spray 2 sprays into the nostril(s) as directed by provider Daily. 16 g 11   • furosemide (LASIX) 20 MG tablet      • glucose blood test strip      • hydrOXYzine pamoate (VISTARIL) 25 MG capsule TAKE 1 CAPSULE BY MOUTH EVERY 6 HOURS AS NEEDED FOR ITCHING. 60 capsule 1   • insulin aspart (NovoLOG FlexPen) 100 UNIT/ML solution pen-injector sc pen Inject 40 Units under the skin into the appropriate area as directed 3 (Three) Times a Day With Meals. 45 mL 11   • latanoprost (XALATAN) 0.005 % ophthalmic solution Administer 1 drop to both eyes Daily.     • levothyroxine (SYNTHROID, LEVOTHROID) 150 MCG tablet Take 1 tablet by mouth Every Morning Before Breakfast. 90 tablet 1   • lisinopril (PRINIVIL,ZESTRIL) 20 MG tablet Take 1 tablet by mouth Daily. 90 tablet 3   • nitroglycerin (NITROSTAT) 0.4 MG SL tablet DISSOLVE 1 TABLET BY MOUTH UNDER THE TONGUE AS NEEDED FOR CHEST PAIN, DO NOT EXCEED MORE THAN 3 DOSES IN 15 MINUTES 25 tablet 11   • ONETOUCH DELICA LANCETS FINE misc USE THREE TIMES DAILY TO TEST 100 each 3   • OneTouch Ultra test strip USE THREE TIMES DAILY 100 each 11   • Lidocaine Viscous HCl (XYLOCAINE) 2 % solution Take 5 mL by mouth As Needed for Mild Pain. 100 mL 0   • nystatin (MYCOSTATIN) 100,000 unit/mL suspension Swish and swallow 5 mL 4 (Four) Times a Day. Swish for 30 seconds then swallow 240 mL 0   • permethrin (ELIMITE) 5 % cream Apply to body starting above most superior rash, apply down to toes covering  all skin.  Leave on 12 hours, wash off in tub or shower. 1 each 5     No current facility-administered medications for this visit.        The following portions of the patient's history were reviewed and updated as appropriate: allergies, current medications, past family history, past medical history, past social history, past surgical history and problem list.        Assessment & Plan   Diagnoses and all orders for this visit:    1. Aphthous ulcer of mouth (Primary)    Other orders  -     nystatin (MYCOSTATIN) 100,000 unit/mL suspension; Swish and swallow 5 mL 4 (Four) Times a Day. Swish for 30 seconds then swallow  Dispense: 240 mL; Refill: 0  -     Lidocaine Viscous HCl (XYLOCAINE) 2 % solution; Take 5 mL by mouth As Needed for Mild Pain.  Dispense: 100 mL; Refill: 0      If lasts more than a week or so, call and ill call in a steroid pack.                  No follow-ups on file.                  This document has been electronically signed by Chon Nguyễn MD on November 9, 2022 15:32 CST

## 2022-12-27 ENCOUNTER — OFFICE VISIT (OUTPATIENT)
Dept: FAMILY MEDICINE CLINIC | Facility: CLINIC | Age: 67
End: 2022-12-27

## 2022-12-27 VITALS
WEIGHT: 260 LBS | OXYGEN SATURATION: 98 % | HEIGHT: 70 IN | HEART RATE: 67 BPM | BODY MASS INDEX: 37.22 KG/M2 | DIASTOLIC BLOOD PRESSURE: 72 MMHG | SYSTOLIC BLOOD PRESSURE: 130 MMHG | TEMPERATURE: 97.8 F

## 2022-12-27 DIAGNOSIS — M67.431 GANGLION CYST OF WRIST, RIGHT: Primary | ICD-10-CM

## 2022-12-27 PROCEDURE — 99212 OFFICE O/P EST SF 10 MIN: CPT | Performed by: FAMILY MEDICINE

## 2022-12-27 PROCEDURE — 20612 ASPIRATE/INJ GANGLION CYST: CPT | Performed by: FAMILY MEDICINE

## 2022-12-27 NOTE — PROGRESS NOTES
" Subjective   Pablo Pina is a 67 y.o. male.     Chief Complaint   Patient presents with   • Cyst             History of Present Illness     Right medial anterior wrist cyst.  Some pain associated.     Review of Systems   Constitutional: Negative for chills, fatigue and fever.   HENT: Negative for congestion, ear discharge, ear pain, facial swelling, hearing loss, postnasal drip, rhinorrhea, sinus pressure, sore throat, trouble swallowing and voice change.    Eyes: Negative for discharge, redness and visual disturbance.   Respiratory: Negative for cough, chest tightness, shortness of breath and wheezing.    Cardiovascular: Negative for chest pain and palpitations.   Gastrointestinal: Negative for abdominal pain, blood in stool, constipation, diarrhea, nausea and vomiting.   Endocrine: Negative for polydipsia and polyuria.   Genitourinary: Negative for dysuria, flank pain, hematuria and urgency.   Musculoskeletal: Negative for arthralgias, back pain, joint swelling and myalgias.   Skin: Negative for rash.   Neurological: Negative for dizziness, weakness, numbness and headaches.   Hematological: Negative for adenopathy.   Psychiatric/Behavioral: Negative for confusion and sleep disturbance. The patient is not nervous/anxious.            /72 (BP Location: Left arm, Patient Position: Sitting, Cuff Size: Adult)   Pulse 67   Temp 97.8 °F (36.6 °C) (Infrared)   Ht 177.8 cm (70\")   Wt 118 kg (260 lb)   SpO2 98%   BMI 37.31 kg/m²       Objective     Physical Exam  Vitals and nursing note reviewed.   Constitutional:       Appearance: Normal appearance. He is well-developed.   HENT:      Head: Normocephalic and atraumatic.      Right Ear: External ear normal.      Left Ear: External ear normal.      Nose: Nose normal.   Eyes:      Extraocular Movements: Extraocular movements intact.      Conjunctiva/sclera: Conjunctivae normal.      Pupils: Pupils are equal, round, and reactive to light.   Pulmonary:      " Effort: Pulmonary effort is normal.   Musculoskeletal:         General: Normal range of motion.      Cervical back: Normal range of motion.   Skin:     Comments: Subcutaneous cyst medial anterior right wrist.    Neurological:      General: No focal deficit present.      Mental Status: He is alert and oriented to person, place, and time.   Psychiatric:         Behavior: Behavior normal.         Thought Content: Thought content normal.         Judgment: Judgment normal.             PAST MEDICAL HISTORY     Past Medical History:   Diagnosis Date   • Acquired hypothyroidism    • Acute bronchitis    • Arm pain     Pain in right arm     • Borderline glaucoma    • Clouded consciousness     wooziness when getting up from laying or sitting position    • Coronary arteriosclerosis    • Encounter for immunization    • Essential hypertension    • Fatigue    • Feces, abnormal     Other fecal abnormalities   • Gastroesophageal reflux disease    • Hypercholesterolemia    • Hyperlipidemia    • Hypermetropia    • Hypertensive disorder    • Impacted cerumen    • Impaired glucose tolerance    • Impotence    • Need for immunization against influenza    • Obstructive sleep apnea syndrome    • Overweight    • Patient noncompliance     with other medical treatment and regimen    • Patient noncompliance, general    • Presbyopia    • Sacroiliac joint pain    • Sensorineural hearing loss of both ears    • Skin tag    • Type 2 diabetes mellitus (HCC)       PAST SURGICAL HISTORY     Past Surgical History:   Procedure Laterality Date   • CARDIAC CATHETERIZATION N/A 10/9/2018    Procedure: Left Heart Cath;  Surgeon: Lit Rodriguez MD;  Location: [facility] CATH INVASIVE LOCATION;  Service: Cardiology   • CARDIAC CATHETERIZATION N/A 6/18/2019    Procedure: Left Heart Cath;  Surgeon: Lit Rodriguez MD;  Location: [facility] CATH INVASIVE LOCATION;  Service: Cardiology   • CORONARY ARTERY BYPASS GRAFT  01/26/2005    CABG (1) - LIMA to LAD, SVG to D1,  SVG to OM,SVG to PDA.   • INJECTION OF MEDICATION  10/01/2015    Kenalog (1) - OBI Nguyễn   • OTHER SURGICAL HISTORY      Coronary artery stent placement (1)   • OTHER SURGICAL HISTORY  2012    Remove Impacted Cerumen 31916 (1) - OLYA Hammond   • OTHER SURGICAL HISTORY  2014    Skin Tag Removal (Each Additional 10 49008 (1) - OBI Nguyễn   • OTHER SURGICAL HISTORY  10/01/2015    Nebulizer Treatment 35818 (1) - OBI Nguyễn      SOCIAL HISTORY     Social History     Socioeconomic History   • Marital status:    Tobacco Use   • Smoking status: Former     Packs/day: 1.00     Years: 31.00     Pack years: 31.00     Types: Cigarettes     Start date:      Quit date:      Years since quittin.9   • Smokeless tobacco: Never   • Tobacco comments:     Quit around    Substance and Sexual Activity   • Alcohol use: No   • Drug use: No   • Sexual activity: Never      ALLERGIES   Patient has no known allergies.   MEDICATIONS     Current Outpatient Medications   Medication Sig Dispense Refill   • albuterol sulfate  (90 Base) MCG/ACT inhaler Inhale 2 puffs Every 4 (Four) Hours As Needed for Wheezing. 1 inhaler 11   • Alcohol Swabs 70 % pads 1 each 2 (Two) Times a Day. 100 each 11   • aspirin 81 MG chewable tablet Chew 81 mg Every Night.     • atorvastatin (LIPITOR) 40 MG tablet TAKE 1 TABLET BY MOUTH EVERY DAY 90 tablet 1   • B-D UF III MINI PEN NEEDLES 31G X 5 MM misc USE FOUR TIMES DAILY 120 each 11   • carvedilol (COREG) 3.125 MG tablet TAKE 1 TABLET BY MOUTH TWICE A DAY WITH MEALS 180 tablet 2   • cetirizine (zyrTEC) 10 MG tablet Take 1 tablet by mouth Daily. 30 tablet 11   • clopidogrel (PLAVIX) 75 MG tablet Take 1 tablet by mouth Daily. 90 tablet 3   • Continuous Blood Gluc  (FreeStyle Britta 2 [place]) device 1 each Take As Directed. 28 each 11   • Continuous Blood Gluc  (FreeStyle Britta 2 [place]) device 1 each Daily. URGENT 1 each 11   • Continuous Blood Gluc Sensor (FreeStyle Britta 14  Day Sensor) misc Inject 1 each under the skin into the appropriate area as directed Every 14 (Fourteen) Days. 13 each 3   • Continuous Blood Gluc Sensor (FreeStyle Britta 2 Sensor) misc USE AS DIRECTED. 2 each 11   • fluticasone (Flonase) 50 MCG/ACT nasal spray 2 sprays into the nostril(s) as directed by provider Daily. 16 g 11   • furosemide (LASIX) 20 MG tablet      • glucose blood test strip      • hydrOXYzine pamoate (VISTARIL) 25 MG capsule TAKE 1 CAPSULE BY MOUTH EVERY 6 HOURS AS NEEDED FOR ITCHING. 60 capsule 1   • insulin aspart (NovoLOG FlexPen) 100 UNIT/ML solution pen-injector sc pen Inject 40 Units under the skin into the appropriate area as directed 3 (Three) Times a Day With Meals. 45 mL 11   • latanoprost (XALATAN) 0.005 % ophthalmic solution Administer 1 drop to both eyes Daily.     • levothyroxine (SYNTHROID, LEVOTHROID) 150 MCG tablet Take 1 tablet by mouth Every Morning Before Breakfast. 90 tablet 1   • Lidocaine Viscous HCl (XYLOCAINE) 2 % solution Take 5 mL by mouth As Needed for Mild Pain. 100 mL 0   • lisinopril (PRINIVIL,ZESTRIL) 20 MG tablet Take 1 tablet by mouth Daily. 90 tablet 3   • nitroglycerin (NITROSTAT) 0.4 MG SL tablet DISSOLVE 1 TABLET BY MOUTH UNDER THE TONGUE AS NEEDED FOR CHEST PAIN, DO NOT EXCEED MORE THAN 3 DOSES IN 15 MINUTES 25 tablet 11   • nystatin (MYCOSTATIN) 100,000 unit/mL suspension Swish and swallow 5 mL 4 (Four) Times a Day. Swish for 30 seconds then swallow 240 mL 0   • ONETOUCH DELICA LANCETS FINE misc USE THREE TIMES DAILY TO TEST 100 each 3   • OneTouch Ultra test strip USE THREE TIMES DAILY 100 each 11   • permethrin (ELIMITE) 5 % cream Apply to body starting above most superior rash, apply down to toes covering all skin.  Leave on 12 hours, wash off in tub or shower. 1 each 5     No current facility-administered medications for this visit.        The following portions of the patient's history were reviewed and updated as appropriate: allergies, current  medications, past family history, past medical history, past social history, past surgical history and problem list.        Assessment & Plan   Diagnoses and all orders for this visit:    1. Ganglion cyst of wrist, right (Primary)      PROCEDURE NOTE:  CONSENT SIGNED.  AREA PREPPED BETADINE AND ALLOWED TO DRY.  STERILE TECHNIQUE, COOLING SPRAY, THEN 25 GAUGE NEEDLE INSERTED INTO WRIST, LIDOCAINE INFUSED, HOLES PIERCED INTO CYST SUBCUTANEOUSLY.  CYST THEN COMPRESSED AND FLATTENED.  TOLERATED WELL.    DISCUSSED BRUISING, MAY RETURN. DISCUSSED SURGICAL EXCISION.                      No follow-ups on file.                  This document has been electronically signed by Chon Nguyễn MD on December 27, 2022 13:18 CST

## 2023-01-03 ENCOUNTER — TELEPHONE (OUTPATIENT)
Dept: ENDOCRINOLOGY | Facility: CLINIC | Age: 68
End: 2023-01-03
Payer: MEDICARE

## 2023-01-03 DIAGNOSIS — E11.65 TYPE 2 DIABETES MELLITUS WITH HYPERGLYCEMIA, WITH LONG-TERM CURRENT USE OF INSULIN: Primary | ICD-10-CM

## 2023-01-03 DIAGNOSIS — Z79.4 TYPE 2 DIABETES MELLITUS WITH HYPERGLYCEMIA, WITH LONG-TERM CURRENT USE OF INSULIN: Primary | ICD-10-CM

## 2023-01-03 RX ORDER — ATORVASTATIN CALCIUM 40 MG/1
TABLET, FILM COATED ORAL
Qty: 90 TABLET | Refills: 1 | Status: SHIPPED | OUTPATIENT
Start: 2023-01-03

## 2023-01-03 RX ORDER — INSULIN DETEMIR 100 [IU]/ML
24 INJECTION, SOLUTION SUBCUTANEOUS DAILY
Qty: 10 ML | Refills: 6 | Status: SHIPPED | OUTPATIENT
Start: 2023-01-03 | End: 2023-01-03 | Stop reason: SDUPTHER

## 2023-01-03 RX ORDER — INSULIN DETEMIR 100 [IU]/ML
24 INJECTION, SOLUTION SUBCUTANEOUS DAILY
Qty: 15 ML | Refills: 1 | Status: SHIPPED | OUTPATIENT
Start: 2023-01-03

## 2023-01-03 RX ORDER — INSULIN ASPART 100 [IU]/ML
40 INJECTION, SOLUTION INTRAVENOUS; SUBCUTANEOUS
Qty: 45 ML | Refills: 11 | Status: SHIPPED | OUTPATIENT
Start: 2023-01-03

## 2023-01-03 RX ORDER — INSULIN ASPART 100 [IU]/ML
40 INJECTION, SOLUTION INTRAVENOUS; SUBCUTANEOUS
Qty: 45 ML | Refills: 11 | Status: SHIPPED | OUTPATIENT
Start: 2023-01-03 | End: 2023-01-03 | Stop reason: SDUPTHER

## 2023-01-03 NOTE — TELEPHONE ENCOUNTER
Pt called, stating that the new $35 cap law on insulin is only good on 30 day prescriptions, per his pharmacy [facility].    Pt requests that the refills sent in today, the Novolog and Levemir, be resubmitted for 30 days only.    Pt callback number [phone]

## 2023-01-03 NOTE — TELEPHONE ENCOUNTER
Pt called and is needing a new RX for his Novalog (he uses 30 units each meal) and a new RX for his Levemir. Pt uses [facility] pharmacy.    Thanks

## 2023-01-17 ENCOUNTER — OFFICE VISIT (OUTPATIENT)
Dept: FAMILY MEDICINE CLINIC | Facility: CLINIC | Age: 68
End: 2023-01-17
Payer: MEDICARE

## 2023-01-17 ENCOUNTER — TELEPHONE (OUTPATIENT)
Dept: ENDOCRINOLOGY | Facility: CLINIC | Age: 68
End: 2023-01-17
Payer: MEDICARE

## 2023-01-17 VITALS
BODY MASS INDEX: 37.31 KG/M2 | DIASTOLIC BLOOD PRESSURE: 68 MMHG | HEART RATE: 80 BPM | HEIGHT: 70 IN | TEMPERATURE: 98.8 F | SYSTOLIC BLOOD PRESSURE: 128 MMHG | OXYGEN SATURATION: 97 %

## 2023-01-17 DIAGNOSIS — E11.65 TYPE 2 DIABETES MELLITUS WITH HYPERGLYCEMIA, WITH LONG-TERM CURRENT USE OF INSULIN: Primary | ICD-10-CM

## 2023-01-17 DIAGNOSIS — H57.89 EYE IRRITATION: ICD-10-CM

## 2023-01-17 DIAGNOSIS — R10.9 ABDOMINAL PAIN, UNSPECIFIED ABDOMINAL LOCATION: ICD-10-CM

## 2023-01-17 DIAGNOSIS — Z79.4 TYPE 2 DIABETES MELLITUS WITH HYPERGLYCEMIA, WITH LONG-TERM CURRENT USE OF INSULIN: Primary | ICD-10-CM

## 2023-01-17 DIAGNOSIS — R05.1 ACUTE COUGH: Primary | ICD-10-CM

## 2023-01-17 PROCEDURE — 96372 THER/PROPH/DIAG INJ SC/IM: CPT | Performed by: FAMILY MEDICINE

## 2023-01-17 PROCEDURE — 99213 OFFICE O/P EST LOW 20 MIN: CPT | Performed by: FAMILY MEDICINE

## 2023-01-17 RX ORDER — CEPHALEXIN 250 MG/1
250 CAPSULE ORAL 4 TIMES DAILY
Qty: 28 CAPSULE | Refills: 0 | Status: SHIPPED | OUTPATIENT
Start: 2023-01-17 | End: 2023-02-22

## 2023-01-17 RX ORDER — TRIAMCINOLONE ACETONIDE 40 MG/ML
80 INJECTION, SUSPENSION INTRA-ARTICULAR; INTRAMUSCULAR ONCE
Status: COMPLETED | OUTPATIENT
Start: 2023-01-17 | End: 2023-01-17

## 2023-01-17 RX ORDER — BROMPHENIRAMINE MALEATE, PSEUDOEPHEDRINE HYDROCHLORIDE, AND DEXTROMETHORPHAN HYDROBROMIDE 2; 30; 10 MG/5ML; MG/5ML; MG/5ML
5 SYRUP ORAL 4 TIMES DAILY PRN
Qty: 240 ML | Refills: 0 | Status: SHIPPED | OUTPATIENT
Start: 2023-01-17

## 2023-01-17 RX ORDER — DIPHENHYDRAMINE HCL 25 MG
1 CAPSULE ORAL AS NEEDED
Qty: 30 ML | Refills: 11 | Status: SHIPPED | OUTPATIENT
Start: 2023-01-17

## 2023-01-17 RX ADMIN — TRIAMCINOLONE ACETONIDE 80 MG: 40 INJECTION, SUSPENSION INTRA-ARTICULAR; INTRAMUSCULAR at 14:29

## 2023-01-17 NOTE — PROGRESS NOTES
Injection  Injection performed in    by Malina Hartmann MA. Patient tolerated the procedure well without complications.   01/17/23   Malina Hartmann MA

## 2023-01-17 NOTE — PROGRESS NOTES
" Subjective   Pablo Pina is a 67 y.o. male.     Chief Complaint   Patient presents with   • Eye Problem             History of Present Illness     Right eye, feels like has a stye.  Feels irritated and he can point to the spot, upper lateral eye lid.   Also  Cough and sneezing at night, wants a steroid shot  Also  Abdominal pain 2-3 days, a pulled muscle but it is ok now  Also  Wants to know about ozempic and wegovy.     Review of Systems   Constitutional: Negative for chills, fatigue and fever.   HENT: Negative for congestion, ear discharge, ear pain, facial swelling, hearing loss, postnasal drip, rhinorrhea, sinus pressure, sore throat, trouble swallowing and voice change.    Eyes: Negative for discharge, redness and visual disturbance.   Respiratory: Positive for cough. Negative for chest tightness, shortness of breath and wheezing.    Cardiovascular: Negative for chest pain and palpitations.   Gastrointestinal: Negative for abdominal pain, blood in stool, constipation, diarrhea, nausea and vomiting.   Endocrine: Negative for polydipsia and polyuria.   Genitourinary: Negative for dysuria, flank pain, hematuria and urgency.   Musculoskeletal: Negative for arthralgias, back pain, joint swelling and myalgias.   Skin: Negative for rash.   Neurological: Negative for dizziness, weakness, numbness and headaches.   Hematological: Negative for adenopathy.   Psychiatric/Behavioral: Negative for confusion and sleep disturbance. The patient is not nervous/anxious.            /68 (BP Location: Left arm, Patient Position: Sitting, Cuff Size: Adult)   Pulse 80   Temp 98.8 °F (37.1 °C) (Infrared)   Ht 177.8 cm (70\")   SpO2 97%   BMI 37.31 kg/m²       Objective     Physical Exam  Vitals and nursing note reviewed.   Constitutional:       Appearance: Normal appearance. He is well-developed.   HENT:      Head: Normocephalic and atraumatic.      Right Ear: External ear normal.      Left Ear: External ear normal. "      Nose: Nose normal. No rhinorrhea.   Eyes:      General: No scleral icterus.     Extraocular Movements: Extraocular movements intact.      Conjunctiva/sclera: Conjunctivae normal.      Pupils: Pupils are equal, round, and reactive to light.      Comments: Right eye may be a little dry and irritated. I don't see a stye?   Cardiovascular:      Rate and Rhythm: Normal rate and regular rhythm.      Heart sounds: Normal heart sounds.     No friction rub. No gallop.   Pulmonary:      Effort: Pulmonary effort is normal.      Breath sounds: Normal breath sounds.      Comments: Little prolong exp phase   Abdominal:      General: Bowel sounds are normal. There is no distension.      Palpations: Abdomen is soft.      Tenderness: There is no abdominal tenderness.   Musculoskeletal:         General: No deformity. Normal range of motion.      Cervical back: Normal range of motion and neck supple.   Skin:     General: Skin is warm and dry.      Findings: No erythema or rash.   Neurological:      Mental Status: He is alert and oriented to person, place, and time.      Cranial Nerves: No cranial nerve deficit.   Psychiatric:         Behavior: Behavior normal.         Thought Content: Thought content normal.         Judgment: Judgment normal.             PAST MEDICAL HISTORY     Past Medical History:   Diagnosis Date   • Acquired hypothyroidism    • Acute bronchitis    • Arm pain     Pain in right arm     • Borderline glaucoma    • Clouded consciousness     wooziness when getting up from laying or sitting position    • Coronary arteriosclerosis    • Encounter for immunization    • Essential hypertension    • Fatigue    • Feces, abnormal     Other fecal abnormalities   • Gastroesophageal reflux disease    • Hypercholesterolemia    • Hyperlipidemia    • Hypermetropia    • Hypertensive disorder    • Impacted cerumen    • Impaired glucose tolerance    • Impotence    • Need for immunization against influenza    • Obstructive sleep  apnea syndrome    • Overweight    • Patient noncompliance     with other medical treatment and regimen    • Patient noncompliance, general    • Presbyopia    • Sacroiliac joint pain    • Sensorineural hearing loss of both ears    • Skin tag    • Type 2 diabetes mellitus (HCC)       PAST SURGICAL HISTORY     Past Surgical History:   Procedure Laterality Date   • CARDIAC CATHETERIZATION N/A 10/9/2018    Procedure: Left Heart Cath;  Surgeon: Lit Rodriguez MD;  Location: [facility] CATH INVASIVE LOCATION;  Service: Cardiology   • CARDIAC CATHETERIZATION N/A 2019    Procedure: Left Heart Cath;  Surgeon: Lit Rodriguez MD;  Location: [facility] CATH INVASIVE LOCATION;  Service: Cardiology   • CORONARY ARTERY BYPASS GRAFT  2005    CABG (1) - LIMA to LAD, SVG to D1, SVG to OM,SVG to PDA.   • INJECTION OF MEDICATION  10/01/2015    Kenalog (1) - OBI Nguyễn   • OTHER SURGICAL HISTORY      Coronary artery stent placement (1)   • OTHER SURGICAL HISTORY  2012    Remove Impacted Cerumen 34888 (1) - OLYA Hammond   • OTHER SURGICAL HISTORY  2014    Skin Tag Removal (Each Additional 10 70437 (1) - OBI Nguyễn   • OTHER SURGICAL HISTORY  10/01/2015    Nebulizer Treatment 87575 (1) - OBI Nguyễn      SOCIAL HISTORY     Social History     Socioeconomic History   • Marital status:    Tobacco Use   • Smoking status: Former     Packs/day: 1.00     Years: 31.00     Pack years: 31.00     Types: Cigarettes     Start date:      Quit date:      Years since quittin.0   • Smokeless tobacco: Never   • Tobacco comments:     Quit around    Substance and Sexual Activity   • Alcohol use: No   • Drug use: No   • Sexual activity: Never      ALLERGIES   Patient has no known allergies.   MEDICATIONS     Current Outpatient Medications   Medication Sig Dispense Refill   • albuterol sulfate  (90 Base) MCG/ACT inhaler Inhale 2 puffs Every 4 (Four) Hours As Needed for Wheezing. 1 inhaler 11   • Alcohol Swabs 70 % pads 1 each  2 (Two) Times a Day. 100 each 11   • aspirin 81 MG chewable tablet Chew 81 mg Every Night.     • atorvastatin (LIPITOR) 40 MG tablet TAKE 1 TABLET BY MOUTH EVERY DAY 90 tablet 1   • B-D UF III MINI PEN NEEDLES 31G X 5 MM misc USE FOUR TIMES DAILY 120 each 11   • carvedilol (COREG) 3.125 MG tablet TAKE 1 TABLET BY MOUTH TWICE A DAY WITH MEALS 180 tablet 2   • cetirizine (zyrTEC) 10 MG tablet Take 1 tablet by mouth Daily. 30 tablet 11   • clopidogrel (PLAVIX) 75 MG tablet Take 1 tablet by mouth Daily. 90 tablet 3   • Continuous Blood Gluc  (FreeStyle Britta 2 [place]) device 1 each Take As Directed. 28 each 11   • Continuous Blood Gluc  (FreeStyle Britta 2 [place]) device 1 each Daily. URGENT 1 each 11   • Continuous Blood Gluc Sensor (FreeStyle Britta 14 Day Sensor) misc Inject 1 each under the skin into the appropriate area as directed Every 14 (Fourteen) Days. 13 each 3   • Continuous Blood Gluc Sensor (FreeStyle Britta 2 Sensor) misc USE AS DIRECTED. 2 each 11   • fluticasone (Flonase) 50 MCG/ACT nasal spray 2 sprays into the nostril(s) as directed by provider Daily. 16 g 11   • furosemide (LASIX) 20 MG tablet      • glucose blood test strip      • hydrOXYzine pamoate (VISTARIL) 25 MG capsule TAKE 1 CAPSULE BY MOUTH EVERY 6 HOURS AS NEEDED FOR ITCHING. 60 capsule 1   • insulin aspart (NovoLOG FlexPen) 100 UNIT/ML solution pen-injector sc pen Inject 40 Units under the skin into the appropriate area as directed 3 (Three) Times a Day With Meals. 45 mL 11   • insulin detemir (Levemir FlexTouch) 100 UNIT/ML injection Inject 24 Units under the skin into the appropriate area as directed Daily. 15 mL 1   • latanoprost (XALATAN) 0.005 % ophthalmic solution Administer 1 drop to both eyes Daily.     • levothyroxine (SYNTHROID, LEVOTHROID) 150 MCG tablet Take 1 tablet by mouth Every Morning Before Breakfast. 90 tablet 1   • Lidocaine Viscous HCl (XYLOCAINE) 2 % solution Take 5 mL by mouth As Needed for Mild Pain.  100 mL 0   • lisinopril (PRINIVIL,ZESTRIL) 20 MG tablet Take 1 tablet by mouth Daily. 90 tablet 3   • nitroglycerin (NITROSTAT) 0.4 MG SL tablet DISSOLVE 1 TABLET BY MOUTH UNDER THE TONGUE AS NEEDED FOR CHEST PAIN, DO NOT EXCEED MORE THAN 3 DOSES IN 15 MINUTES 25 tablet 11   • nystatin (MYCOSTATIN) 100,000 unit/mL suspension Swish and swallow 5 mL 4 (Four) Times a Day. Swish for 30 seconds then swallow 240 mL 0   • ONETOUCH DELICA LANCETS FINE misc USE THREE TIMES DAILY TO TEST 100 each 3   • OneTouch Ultra test strip USE THREE TIMES DAILY 100 each 11   • permethrin (ELIMITE) 5 % cream Apply to body starting above most superior rash, apply down to toes covering all skin.  Leave on 12 hours, wash off in tub or shower. 1 each 5   • brompheniramine-pseudoephedrine-DM 30-2-10 MG/5ML syrup Take 5 mL by mouth 4 (Four) Times a Day As Needed for Allergies. 240 mL 0   • cephalexin (Keflex) 250 MG capsule Take 1 capsule by mouth 4 (Four) Times a Day. 28 capsule 0   • Dextran 70-Hypromellose (Artificial Tears) 0.1-0.3 % solution Apply 1 drop to eye(s) as directed by provider As Needed (DRY EYES). TO PHARMACIST:  ANY ARTIFICIAL TEARS IS FINE, I HAVE ABOUT 100 TO CHOOSE FROM IN Epic. THANKS. 30 mL 11     No current facility-administered medications for this visit.        The following portions of the patient's history were reviewed and updated as appropriate: allergies, current medications, past family history, past medical history, past social history, past surgical history and problem list.        Assessment & Plan   Diagnoses and all orders for this visit:    1. Acute cough (Primary)  -     triamcinolone acetonide (KENALOG-40) injection 80 mg    2. Eye irritation    3. Abdominal pain, unspecified abdominal location    Other orders  -     brompheniramine-pseudoephedrine-DM 30-2-10 MG/5ML syrup; Take 5 mL by mouth 4 (Four) Times a Day As Needed for Allergies.  Dispense: 240 mL; Refill: 0  -     cephalexin (Keflex) 250 MG  capsule; Take 1 capsule by mouth 4 (Four) Times a Day.  Dispense: 28 capsule; Refill: 0  -     Dextran 70-Hypromellose (Artificial Tears) 0.1-0.3 % solution; Apply 1 drop to eye(s) as directed by provider As Needed (DRY EYES). TO PHARMACIST:  ANY ARTIFICIAL TEARS IS FINE, I HAVE ABOUT 100 TO CHOOSE FROM IN [facility]. THANKS.  Dispense: 30 mL; Refill: 11      He is to talk to endocrinology who is managing his diabetes re ozempic.    Keflex just in case he is getting a stye.  Use artificial tears     cogh medicine and steroid shot.                     No follow-ups on file.                  This document has been electronically signed by Chon Nguyễn MD on January 17, 2023 14:40 CST

## 2023-01-17 NOTE — TELEPHONE ENCOUNTER
Pt called to see about getting on a new medication called Wegovy for weight loss. He said it's an injection that goes with his insulin. I advised pt that since it's a new medication he is requesting that Billy may not fill this until she sees him for his follow up in April, but I would send a message back and see. Pt voiced understanding.    Please advise.    Thanks

## 2023-01-18 ENCOUNTER — TELEPHONE (OUTPATIENT)
Dept: ENDOCRINOLOGY | Facility: CLINIC | Age: 68
End: 2023-01-18
Payer: MEDICARE

## 2023-01-18 RX ORDER — SEMAGLUTIDE 1.34 MG/ML
0.25 INJECTION, SOLUTION SUBCUTANEOUS WEEKLY
Qty: 3 ML | Refills: 3 | Status: SHIPPED | OUTPATIENT
Start: 2023-01-18

## 2023-01-18 NOTE — TELEPHONE ENCOUNTER
Pablo was called in ozempic     .25mg : 9 click  .5mg : 18 clicks  1 mg : 27 clicks       Will call back to get the correct amount of clicks when he calls please let him know

## 2023-01-23 RX ORDER — LEVOTHYROXINE SODIUM 0.15 MG/1
TABLET ORAL
Qty: 90 TABLET | Refills: 1 | Status: SHIPPED | OUTPATIENT
Start: 2023-01-23

## 2023-01-26 ENCOUNTER — TELEPHONE (OUTPATIENT)
Dept: FAMILY MEDICINE CLINIC | Facility: CLINIC | Age: 68
End: 2023-01-26
Payer: MEDICARE

## 2023-01-26 RX ORDER — ALBUTEROL SULFATE 90 UG/1
2 AEROSOL, METERED RESPIRATORY (INHALATION) EVERY 4 HOURS PRN
Qty: 18 G | Refills: 0 | Status: SHIPPED | OUTPATIENT
Start: 2023-01-26

## 2023-01-26 RX ORDER — AZITHROMYCIN 250 MG/1
TABLET, FILM COATED ORAL
Qty: 6 TABLET | Refills: 0 | Status: SHIPPED | OUTPATIENT
Start: 2023-01-26

## 2023-01-26 NOTE — TELEPHONE ENCOUNTER
PATIENT STATES HE STILL HAS COUGH AND CONGESTION AND WANTS TO KNOW IF YOU CAN SEND IN SOMETHING TO BREAK IT UP.  [facility]

## 2023-02-14 RX ORDER — FLUTICASONE PROPIONATE 50 MCG
SPRAY, SUSPENSION (ML) NASAL
Qty: 48 ML | Refills: 3 | Status: SHIPPED | OUTPATIENT
Start: 2023-02-14

## 2023-02-20 ENCOUNTER — TELEPHONE (OUTPATIENT)
Dept: FAMILY MEDICINE CLINIC | Facility: CLINIC | Age: 68
End: 2023-02-20
Payer: MEDICARE

## 2023-02-20 RX ORDER — TIZANIDINE 4 MG/1
4 TABLET ORAL EVERY 6 HOURS PRN
Qty: 60 TABLET | Refills: 11 | Status: SHIPPED | OUTPATIENT
Start: 2023-02-20

## 2023-02-20 NOTE — TELEPHONE ENCOUNTER
Patient states he pulled a muscle in his shoulder and is requesting a prescription for a muscle relaxer.  [facility]

## 2023-02-22 ENCOUNTER — OFFICE VISIT (OUTPATIENT)
Dept: FAMILY MEDICINE CLINIC | Facility: CLINIC | Age: 68
End: 2023-02-22
Payer: MEDICARE

## 2023-02-22 VITALS
BODY MASS INDEX: 35.79 KG/M2 | TEMPERATURE: 97.5 F | HEIGHT: 70 IN | WEIGHT: 250 LBS | OXYGEN SATURATION: 97 % | HEART RATE: 69 BPM | SYSTOLIC BLOOD PRESSURE: 121 MMHG | DIASTOLIC BLOOD PRESSURE: 74 MMHG

## 2023-02-22 DIAGNOSIS — M25.512 ACUTE PAIN OF LEFT SHOULDER: Primary | ICD-10-CM

## 2023-02-22 PROCEDURE — 99213 OFFICE O/P EST LOW 20 MIN: CPT | Performed by: NURSE PRACTITIONER

## 2023-02-22 RX ORDER — NAPROXEN 500 MG/1
500 TABLET ORAL 2 TIMES DAILY WITH MEALS
Qty: 60 TABLET | Refills: 0 | Status: SHIPPED | OUTPATIENT
Start: 2023-02-22

## 2023-03-02 ENCOUNTER — TELEPHONE (OUTPATIENT)
Dept: FAMILY MEDICINE CLINIC | Facility: CLINIC | Age: 68
End: 2023-03-02
Payer: MEDICARE

## 2023-03-02 DIAGNOSIS — M25.512 ACUTE PAIN OF LEFT SHOULDER: Primary | ICD-10-CM

## 2023-03-02 NOTE — TELEPHONE ENCOUNTER
Mr. Pina would like for you to put an order in for X-Ray of his L shoulder and arm. He saw Johanna on 02/22/23.

## 2023-03-15 ENCOUNTER — TELEPHONE (OUTPATIENT)
Dept: FAMILY MEDICINE CLINIC | Facility: CLINIC | Age: 68
End: 2023-03-15
Payer: MEDICARE

## 2023-03-15 NOTE — TELEPHONE ENCOUNTER
Mr. Pina called and wanted to know if you would put the referral in for him that you talked with him about for his L shoulder Tendonitis.  [phone]

## 2023-03-16 DIAGNOSIS — M25.512 ACUTE PAIN OF LEFT SHOULDER: Primary | ICD-10-CM

## 2023-03-23 ENCOUNTER — OFFICE VISIT (OUTPATIENT)
Dept: ORTHOPEDIC SURGERY | Facility: CLINIC | Age: 68
End: 2023-03-23
Payer: MEDICARE

## 2023-03-23 VITALS — HEIGHT: 70 IN | WEIGHT: 244 LBS | BODY MASS INDEX: 34.93 KG/M2

## 2023-03-23 DIAGNOSIS — M79.602 PAIN OF LEFT UPPER EXTREMITY: ICD-10-CM

## 2023-03-23 DIAGNOSIS — M25.512 ACUTE PAIN OF LEFT SHOULDER: Primary | ICD-10-CM

## 2023-03-23 PROCEDURE — 96372 THER/PROPH/DIAG INJ SC/IM: CPT | Performed by: NURSE PRACTITIONER

## 2023-03-23 PROCEDURE — 99214 OFFICE O/P EST MOD 30 MIN: CPT | Performed by: NURSE PRACTITIONER

## 2023-03-23 PROCEDURE — 1160F RVW MEDS BY RX/DR IN RCRD: CPT | Performed by: NURSE PRACTITIONER

## 2023-03-23 PROCEDURE — 1159F MED LIST DOCD IN RCRD: CPT | Performed by: NURSE PRACTITIONER

## 2023-03-23 RX ORDER — TRIAMCINOLONE ACETONIDE 40 MG/ML
60 INJECTION, SUSPENSION INTRA-ARTICULAR; INTRAMUSCULAR ONCE
Status: COMPLETED | OUTPATIENT
Start: 2023-03-23 | End: 2023-03-23

## 2023-03-23 RX ADMIN — TRIAMCINOLONE ACETONIDE 60 MG: 40 INJECTION, SUSPENSION INTRA-ARTICULAR; INTRAMUSCULAR at 11:09

## 2023-03-23 NOTE — PROGRESS NOTES
Pablo Pina is a 67 y.o. male   Primary provider:  Chon Nguyễn MD       Chief Complaint   Patient presents with   • Left Shoulder - Pain   • Initial Evaluation     Hayley [facility] 3/7/23       HISTORY OF PRESENT ILLNESS:    Mr. Pina is a 67-year-old male who presents today with complaints of left arm pain.  Symptoms have been present for 3 weeks  Patient denies trauma or injury.  He denies burning, tingling, numbness.  He reports pain is in his posterior shoulder and posterior lateral arm.  Range of motion and strength/resistance activities do not increase his pain.  Pain occurs at random times.  Laying down and resting his arm above his head decreases pain.  He denies neck pain.  He is a diabetic.  He has tried Zanaflex and naproxen without relief of symptoms.        Pain  This is a new problem. Episode onset: 3 weeks ago. The problem occurs intermittently. Associated symptoms comments: aching. The symptoms are aggravated by standing (sitting, driving). He has tried ice and heat for the symptoms. The treatment provided mild relief.        CONCURRENT MEDICAL HISTORY:    Past Medical History:   Diagnosis Date   • Acquired hypothyroidism    • Acute bronchitis    • Arm pain     Pain in right arm     • Borderline glaucoma    • Clouded consciousness     wooziness when getting up from laying or sitting position    • Coronary arteriosclerosis    • Encounter for immunization    • Essential hypertension    • Fatigue    • Feces, abnormal     Other fecal abnormalities   • Gastroesophageal reflux disease    • Hypercholesterolemia    • Hyperlipidemia    • Hypermetropia    • Hypertensive disorder    • Impacted cerumen    • Impaired glucose tolerance    • Impotence    • Need for immunization against influenza    • Obstructive sleep apnea syndrome    • Overweight    • Patient noncompliance     with other medical treatment and regimen    • Patient noncompliance, general    • Presbyopia    • Sacroiliac joint pain    •  Sensorineural hearing loss of both ears    • Skin tag    • Type 2 diabetes mellitus (HCC)        No Known Allergies      Current Outpatient Medications:   •  albuterol sulfate  (90 Base) MCG/ACT inhaler, Inhale 2 puffs Every 4 (Four) Hours As Needed for Wheezing (cough)., Disp: 18 g, Rfl: 0  •  Alcohol Swabs 70 % pads, 1 each 2 (Two) Times a Day., Disp: 100 each, Rfl: 11  •  aspirin 81 MG chewable tablet, Chew 1 tablet Every Night., Disp: , Rfl:   •  atorvastatin (LIPITOR) 40 MG tablet, TAKE 1 TABLET BY MOUTH EVERY DAY, Disp: 90 tablet, Rfl: 1  •  azithromycin (Zithromax) 250 MG tablet, Take 2 tablets the first day, then 1 tablet daily for 4 days., Disp: 6 tablet, Rfl: 0  •  B-D UF III MINI PEN NEEDLES 31G X 5 MM misc, USE FOUR TIMES DAILY, Disp: 120 each, Rfl: 11  •  brompheniramine-pseudoephedrine-DM 30-2-10 MG/5ML syrup, Take 5 mL by mouth 4 (Four) Times a Day As Needed for Allergies., Disp: 240 mL, Rfl: 0  •  carvedilol (COREG) 3.125 MG tablet, TAKE 1 TABLET BY MOUTH TWICE A DAY WITH MEALS, Disp: 180 tablet, Rfl: 2  •  cetirizine (zyrTEC) 10 MG tablet, Take 1 tablet by mouth Daily., Disp: 30 tablet, Rfl: 11  •  clopidogrel (PLAVIX) 75 MG tablet, Take 1 tablet by mouth Daily., Disp: 90 tablet, Rfl: 3  •  Continuous Blood Gluc  (FreeStyle Britta 2 [place]) device, 1 each Take As Directed., Disp: 28 each, Rfl: 11  •  Continuous Blood Gluc  (FreeStyle Britta 2 [place]) device, 1 each Daily. URGENT, Disp: 1 each, Rfl: 11  •  Continuous Blood Gluc Sensor (FreeStyle Britta 14 Day Sensor) misc, Inject 1 each under the skin into the appropriate area as directed Every 14 (Fourteen) Days., Disp: 13 each, Rfl: 3  •  Continuous Blood Gluc Sensor (FreeStyle Britta 2 Sensor) misc, USE AS DIRECTED., Disp: 2 each, Rfl: 11  •  Dextran 70-Hypromellose (Artificial Tears) 0.1-0.3 % solution, Apply 1 drop to eye(s) as directed by provider As Needed (DRY EYES). TO PHARMACIST:  ANY ARTIFICIAL TEARS IS FINE, I HAVE  ABOUT 100 TO CHOOSE FROM IN Epic. THANKS., Disp: 30 mL, Rfl: 11  •  fluticasone (FLONASE) 50 MCG/ACT nasal spray, SPRAY 2 SPRAYS INTO THE NOSTRIL AS DIRECTED BY PROVIDER DAILY., Disp: 48 mL, Rfl: 3  •  furosemide (LASIX) 20 MG tablet, , Disp: , Rfl:   •  glucose blood test strip, , Disp: , Rfl:   •  hydrOXYzine pamoate (VISTARIL) 25 MG capsule, TAKE 1 CAPSULE BY MOUTH EVERY 6 HOURS AS NEEDED FOR ITCHING., Disp: 60 capsule, Rfl: 1  •  insulin aspart (NovoLOG FlexPen) 100 UNIT/ML solution pen-injector sc pen, Inject 40 Units under the skin into the appropriate area as directed 3 (Three) Times a Day With Meals., Disp: 45 mL, Rfl: 11  •  insulin detemir (Levemir FlexTouch) 100 UNIT/ML injection, Inject 24 Units under the skin into the appropriate area as directed Daily., Disp: 15 mL, Rfl: 1  •  latanoprost (XALATAN) 0.005 % ophthalmic solution, Administer 1 drop to both eyes Daily., Disp: , Rfl:   •  levothyroxine (SYNTHROID, LEVOTHROID) 150 MCG tablet, TAKE 1 TABLET BY MOUTH EVERY DAY BEFORE BREAKFAST, Disp: 90 tablet, Rfl: 1  •  Lidocaine Viscous HCl (XYLOCAINE) 2 % solution, Take 5 mL by mouth As Needed for Mild Pain., Disp: 100 mL, Rfl: 0  •  lisinopril (PRINIVIL,ZESTRIL) 20 MG tablet, Take 1 tablet by mouth Daily., Disp: 90 tablet, Rfl: 3  •  naproxen (Naprosyn) 500 MG tablet, Take 1 tablet by mouth 2 (Two) Times a Day With Meals., Disp: 60 tablet, Rfl: 0  •  nitroglycerin (NITROSTAT) 0.4 MG SL tablet, DISSOLVE 1 TABLET BY MOUTH UNDER THE TONGUE AS NEEDED FOR CHEST PAIN, DO NOT EXCEED MORE THAN 3 DOSES IN 15 MINUTES, Disp: 25 tablet, Rfl: 11  •  nystatin (MYCOSTATIN) 100,000 unit/mL suspension, Swish and swallow 5 mL 4 (Four) Times a Day. Swish for 30 seconds then swallow, Disp: 240 mL, Rfl: 0  •  ONETOUCH DELSimmery LANCETS FINE misc, USE THREE TIMES DAILY TO TEST, Disp: 100 each, Rfl: 3  •  OneTouch Ultra test strip, USE THREE TIMES DAILY, Disp: 100 each, Rfl: 11  •  permethrin (ELIMITE) 5 % cream, Apply to body  starting above most superior rash, apply down to toes covering all skin.  Leave on 12 hours, wash off in tub or shower., Disp: 1 each, Rfl: 5  •  Semaglutide, 1 MG/DOSE, (Ozempic, 1 MG/DOSE,) 4 MG/3ML solution pen-injector, Inject 0.25 mg under the skin into the appropriate area as directed 1 (One) Time Per Week., Disp: 3 mL, Rfl: 3  •  tiZANidine (ZANAFLEX) 4 MG tablet, Take 1 tablet by mouth Every 6 (Six) Hours As Needed for Muscle Spasms., Disp: 60 tablet, Rfl: 11  No current facility-administered medications for this visit.    Past Surgical History:   Procedure Laterality Date   • CARDIAC CATHETERIZATION N/A 10/9/2018    Procedure: Left Heart Cath;  Surgeon: Lit Rodriguez MD;  Location: [facility] CATH INVASIVE LOCATION;  Service: Cardiology   • CARDIAC CATHETERIZATION N/A 2019    Procedure: Left Heart Cath;  Surgeon: Lit Rodriguez MD;  Location: [facility] CATH INVASIVE LOCATION;  Service: Cardiology   • CORONARY ARTERY BYPASS GRAFT  2005    CABG (1) - LIMA to LAD, SVG to D1, SVG to OM,SVG to PDA.   • INJECTION OF MEDICATION  10/01/2015    Kenalog (1) - OBI Nguyễn   • OTHER SURGICAL HISTORY      Coronary artery stent placement (1)   • OTHER SURGICAL HISTORY  2012    Remove Impacted Cerumen 42120 (1) - OLYA Hammond   • OTHER SURGICAL HISTORY  2014    Skin Tag Removal (Each Additional 10 93105 (1) - OBI Nguyễn   • OTHER SURGICAL HISTORY  10/01/2015    Nebulizer Treatment 38230 (1) - OBI Nguyễn       Family History   Problem Relation Age of Onset   • Heart attack Brother 46        Social History     Socioeconomic History   • Marital status:    Tobacco Use   • Smoking status: Former     Packs/day: 1.00     Years: 31.00     Pack years: 31.00     Types: Cigarettes     Start date:      Quit date:      Years since quittin.2   • Smokeless tobacco: Never   • Tobacco comments:     Quit around    Substance and Sexual Activity   • Alcohol use: No   • Drug use: No   • Sexual activity: Never     "    Review of Systems   Constitutional: Negative.    HENT: Negative.    Eyes: Negative.    Respiratory: Negative.    Cardiovascular: Negative.    Gastrointestinal: Negative.    Endocrine: Negative.    Genitourinary: Negative.    Musculoskeletal: Negative.    Skin: Negative.    Allergic/Immunologic: Negative.    Neurological: Negative.    Hematological: Negative.    Psychiatric/Behavioral: Negative.    Left arm pain    PHYSICAL EXAMINATION:       Ht 177.8 cm (70\")   Wt 111 kg (244 lb)   BMI 35.01 kg/m²     Physical Exam  Vitals and nursing note reviewed.   Constitutional:       General: He is not in acute distress.     Appearance: He is well-developed. He is not toxic-appearing.   HENT:      Head: Normocephalic.   Pulmonary:      Effort: Pulmonary effort is normal. No respiratory distress.   Skin:     General: Skin is warm and dry.   Neurological:      Mental Status: He is alert and oriented to person, place, and time.   Psychiatric:         Behavior: Behavior normal.         Thought Content: Thought content normal.         Judgment: Judgment normal.         GAIT:     []  Normal  []  Antalgic    Assistive device: []  None  []  Walker     []  Crutches  []  Cane     []  Wheelchair  []  Stretcher    Back Exam     Tenderness   The patient is experiencing no tenderness.     Comments:  No pain with range of motion of neck      Left Shoulder Exam     Tenderness   The patient is experiencing no tenderness.     Range of Motion   The patient has normal left shoulder ROM.    Muscle Strength   The patient has normal left shoulder strength.    Tests   Impingement: negative  Drop arm: negative    Comments:  Negative full can, empty can, and liftoff tests              XR Shoulder 2+ View Left (In Office)    Result Date: 3/7/2023  Narrative: Three view left shoulder HISTORY: Acute left shoulder pain AP films with the humerus in internal and external rotation and scapular Y view were obtained. COMPARISON: Right shoulder September " 23, 2014 FINDINGS: No fracture or dislocation. Hypertrophic change acromioclavicular joint. Minimal calcific tendinitis or bursitis. No other osseous or articular abnormality.     Impression: CONCLUSION: Hypertrophic change acromioclavicular joint. Minimal calcific tendinitis or bursitis. [id number] Electronically signed by:  Murtaza Rodas MD  3/7/2023 1:53 PM CST Workstation: [id number]          ASSESSMENT:    Diagnoses and all orders for this visit:    Acute pain of left shoulder  -     XR Spine Cervical 2 or 3 View  -     triamcinolone acetonide (KENALOG-40) injection 60 mg    Pain of left upper extremity  -     triamcinolone acetonide (KENALOG-40) injection 60 mg          PLAN      X-rays reviewed, degenerative changes noted in spine.  More mild degenerative changes in shoulder.  Pain cannot be reproduced with strength or resistance activity or range of motion of shoulder.  No pain with range of motion of neck.  Patient reports relief when resting his arm above his head.  He also reports a radiating pain.  At this point time suspect possible nerve impingement as source of patient's pain.  We discussed gabapentin, steroid use, and EMG for further evaluation.  At this point in time patient desires to proceed with steroid injection prior to further intervention.  Risk, benefits, alternatives and need to check blood sugar more frequently explained.  Signs and symptoms to report including when to seek care explained.  Patient verbalized understanding is to return in 2 weeks for recheck, if not improving we will likely proceed with EMG at this time.    Return in about 2 weeks (around 4/6/2023).    EMR Dragon/Transciption Disclaimer: Some of this note may be an electronic transcription/translation of spoken language to printed text.  The electronic translation of spoken language may permit erroneous, or at times, nonsensical words or phrases to be inadvertently transcribed. Although I have reviewed the note for such errors,  some may still exist.       This document has been electronically signed by Sierra MACIAS on March 24, 2023 08:15 CDT

## 2023-04-07 ENCOUNTER — TELEPHONE (OUTPATIENT)
Dept: FAMILY MEDICINE CLINIC | Facility: CLINIC | Age: 68
End: 2023-04-07
Payer: MEDICARE

## 2023-04-07 NOTE — TELEPHONE ENCOUNTER
Mr. Pina called and said that he has a hemorrhoid on the outside that is pretty big and is giving him trouble.  He has tried Preparation H and it is not helping.  He said he just wants to get rid of it is there something else he can do?  [facility]

## 2023-04-13 ENCOUNTER — TELEMEDICINE (OUTPATIENT)
Dept: ENDOCRINOLOGY | Facility: CLINIC | Age: 68
End: 2023-04-13
Payer: MEDICARE

## 2023-04-13 DIAGNOSIS — E78.2 MIXED HYPERLIPIDEMIA: ICD-10-CM

## 2023-04-13 DIAGNOSIS — Z79.4 TYPE 2 DIABETES MELLITUS WITH HYPERGLYCEMIA, WITH LONG-TERM CURRENT USE OF INSULIN: Primary | ICD-10-CM

## 2023-04-13 DIAGNOSIS — E03.9 ACQUIRED HYPOTHYROIDISM: ICD-10-CM

## 2023-04-13 DIAGNOSIS — E11.65 TYPE 2 DIABETES MELLITUS WITH HYPERGLYCEMIA, WITH LONG-TERM CURRENT USE OF INSULIN: Primary | ICD-10-CM

## 2023-04-13 DIAGNOSIS — I10 PRIMARY HYPERTENSION: ICD-10-CM

## 2023-04-13 PROCEDURE — 99214 OFFICE O/P EST MOD 30 MIN: CPT | Performed by: NURSE PRACTITIONER

## 2023-04-13 NOTE — PROGRESS NOTES
Chief Complaint  Diabetes mellitus type 2       Subjective          Pablo Pina presents to [facility] ENDOCRINOLOGY  Diabetes         You have chosen to receive care through a telehealth visit.  Do you consent to use a video/audio connection for your medical care today? Yes          TELEHEALTH VIDEO VISIT     This a video visit due to [facility] current guidelines for social distancing due to the COVID 19 pandemic      Mode of Visit: Video  Location of patient: home  You have chosen to receive care through a telehealth visit.  Does the patient consent to use a video/audio connection for your medical care today? Yes  The visit included audio and video interaction. No technical issues occurred during this visit.       Primary Care Provider     Chon Nguyễn MD        67 year old male presents for follow up      Reason diabetes mellitus type 2    Diagnosed in 2010     Timing constant     Quality not controlled    Severity high           Microvascular complications peripheral neuropathy       Current diabetes regimen    Insulin, GLP-1             Current glucose monitoring system              Checks 4 times daily        Britta personal use        7 day average -- 169, 63% target     14 day average -- 176 , 60% in target     30 day average 174 , 62% target     90 day average 174 , 62% in target         No low sugars           Review of Systems - General ROS: negative            Objective   Vital Signs:   There were no vitals taken for this visit.    Physical Exam  Neurological:      General: No focal deficit present.      Mental Status: He is alert.   Psychiatric:         Mood and Affect: Mood normal.         Thought Content: Thought content normal.         Judgment: Judgment normal.        Result Review :   The following data was reviewed by: COLLEEN Carcamo on 06/03/2022:  Common labs        8/29/2022    10:22   Common Labs   PSA 0.364                   Assessment and Plan     There are no diagnoses linked to this encounter.       Glycemic management     Diabetes mellitus type 2          Britta personal use --- could not download                 taking Ozempic 0.5 mg  increase to 1 mg weekly        Metformin stopped due to diarrhea            Levemir  taking  24 units            Taking Humalog 20 up to 30 units 3 times a day with meals     He is adjusting his insulin based on CGM               Lipid Management     Taking Lipitor 40 mg 1 at night        Blood Pressure Management:          Taking coreg 3.125 mg BID         Microvascular Complication Monitoring:       Last eye exam --  May 2022, no DR      Cataract bilateral  -----------     Last Microalbumin-Proteinuria Assessment     Lab Results   Component Value Date    MICROALBUR 1.3 11/01/2021            Neuropathy yes  -----------                Bone Health     Vitamin D deficiency     Taking vitamin D     Thyroid Health     Hypothyroidism taking levothyroxine 150 mcg daily     Lab Results   Component Value Date    TSH 1.200 08/29/2022             Other Diabetes Related Aspects                   Lab Results   Component Value Date     [id number] 05/05/2021                                Follow Up   No follow-ups on file.  Patient was given instructions and counseling regarding his condition or for health maintenance advice. Please see specific information pulled into the AVS if appropriate.         This document has been electronically signed by COLLEEN Carcamo on April 13, 2023 09:02 CDT.

## 2023-04-17 ENCOUNTER — OFFICE VISIT (OUTPATIENT)
Dept: FAMILY MEDICINE CLINIC | Facility: CLINIC | Age: 68
End: 2023-04-17
Payer: MEDICARE

## 2023-04-17 ENCOUNTER — LAB (OUTPATIENT)
Dept: LAB | Facility: HOSPITAL | Age: 68
End: 2023-04-17
Payer: MEDICARE

## 2023-04-17 VITALS
SYSTOLIC BLOOD PRESSURE: 101 MMHG | HEART RATE: 68 BPM | BODY MASS INDEX: 34.5 KG/M2 | OXYGEN SATURATION: 98 % | DIASTOLIC BLOOD PRESSURE: 68 MMHG | WEIGHT: 241 LBS | HEIGHT: 70 IN | TEMPERATURE: 97.7 F

## 2023-04-17 DIAGNOSIS — Z79.4 TYPE 2 DIABETES MELLITUS WITH HYPERGLYCEMIA, WITH LONG-TERM CURRENT USE OF INSULIN: Primary | ICD-10-CM

## 2023-04-17 DIAGNOSIS — E11.65 TYPE 2 DIABETES MELLITUS WITH HYPERGLYCEMIA, WITH LONG-TERM CURRENT USE OF INSULIN: Primary | ICD-10-CM

## 2023-04-17 DIAGNOSIS — I10 PRIMARY HYPERTENSION: ICD-10-CM

## 2023-04-17 DIAGNOSIS — E03.9 ACQUIRED HYPOTHYROIDISM: ICD-10-CM

## 2023-04-17 PROCEDURE — 80053 COMPREHEN METABOLIC PANEL: CPT | Performed by: FAMILY MEDICINE

## 2023-04-17 PROCEDURE — 3074F SYST BP LT 130 MM HG: CPT | Performed by: FAMILY MEDICINE

## 2023-04-17 PROCEDURE — 84443 ASSAY THYROID STIM HORMONE: CPT | Performed by: FAMILY MEDICINE

## 2023-04-17 PROCEDURE — 1160F RVW MEDS BY RX/DR IN RCRD: CPT | Performed by: FAMILY MEDICINE

## 2023-04-17 PROCEDURE — 82570 ASSAY OF URINE CREATININE: CPT | Performed by: FAMILY MEDICINE

## 2023-04-17 PROCEDURE — 99214 OFFICE O/P EST MOD 30 MIN: CPT | Performed by: FAMILY MEDICINE

## 2023-04-17 PROCEDURE — 83036 HEMOGLOBIN GLYCOSYLATED A1C: CPT | Performed by: FAMILY MEDICINE

## 2023-04-17 PROCEDURE — 84156 ASSAY OF PROTEIN URINE: CPT | Performed by: FAMILY MEDICINE

## 2023-04-17 PROCEDURE — 3078F DIAST BP <80 MM HG: CPT | Performed by: FAMILY MEDICINE

## 2023-04-17 PROCEDURE — 1159F MED LIST DOCD IN RCRD: CPT | Performed by: FAMILY MEDICINE

## 2023-04-17 PROCEDURE — 80061 LIPID PANEL: CPT | Performed by: FAMILY MEDICINE

## 2023-04-17 PROCEDURE — 85027 COMPLETE CBC AUTOMATED: CPT | Performed by: FAMILY MEDICINE

## 2023-04-17 NOTE — PROGRESS NOTES
" Subjective   Pablo Pina is a 67 y.o. male.     Chief Complaint   Patient presents with   • Dizziness             History of Present Illness     bp has been low, systolic in 90's.  When getting up, gets dizzy.    Has lost weight on ozempic.   He thinks he got dehydrated and drank gatorade     Review of Systems   Constitutional: Negative for chills, fatigue and fever.   HENT: Negative for congestion, ear discharge, ear pain, facial swelling, hearing loss, postnasal drip, rhinorrhea, sinus pressure, sore throat, trouble swallowing and voice change.    Eyes: Negative for discharge, redness and visual disturbance.   Respiratory: Negative for cough, chest tightness, shortness of breath and wheezing.    Cardiovascular: Negative for chest pain and palpitations.   Gastrointestinal: Negative for abdominal pain, blood in stool, constipation, diarrhea, nausea and vomiting.   Endocrine: Negative for polydipsia and polyuria.   Genitourinary: Negative for dysuria, flank pain, hematuria and urgency.   Musculoskeletal: Negative for arthralgias, back pain, joint swelling and myalgias.   Skin: Negative for rash.   Neurological: Positive for dizziness. Negative for weakness, numbness and headaches.   Hematological: Negative for adenopathy.   Psychiatric/Behavioral: Negative for confusion and sleep disturbance. The patient is not nervous/anxious.            /68 (BP Location: Left arm, Patient Position: Sitting, Cuff Size: Adult)   Pulse 68   Temp 97.7 °F (36.5 °C) (Infrared)   Ht 177.8 cm (70\")   Wt 109 kg (241 lb)   SpO2 98%   BMI 34.58 kg/m²       Objective     Physical Exam  Vitals and nursing note reviewed.   Constitutional:       Appearance: Normal appearance. He is well-developed.   HENT:      Head: Normocephalic and atraumatic.      Right Ear: External ear normal.      Left Ear: External ear normal.      Nose: Nose normal.   Eyes:      Extraocular Movements: Extraocular movements intact.      " Conjunctiva/sclera: Conjunctivae normal.      Pupils: Pupils are equal, round, and reactive to light.   Cardiovascular:      Rate and Rhythm: Normal rate and regular rhythm.   Pulmonary:      Effort: Pulmonary effort is normal.   Musculoskeletal:         General: Normal range of motion.      Cervical back: Normal range of motion.   Neurological:      General: No focal deficit present.      Mental Status: He is alert and oriented to person, place, and time.   Psychiatric:         Behavior: Behavior normal.         Thought Content: Thought content normal.         Judgment: Judgment normal.             PAST MEDICAL HISTORY     Past Medical History:   Diagnosis Date   • Acquired hypothyroidism    • Acute bronchitis    • Arm pain     Pain in right arm     • Borderline glaucoma    • Clouded consciousness     wooziness when getting up from laying or sitting position    • Coronary arteriosclerosis    • Encounter for immunization    • Essential hypertension    • Fatigue    • Feces, abnormal     Other fecal abnormalities   • Gastroesophageal reflux disease    • Hypercholesterolemia    • Hyperlipidemia    • Hypermetropia    • Hypertensive disorder    • Impacted cerumen    • Impaired glucose tolerance    • Impotence    • Need for immunization against influenza    • Obstructive sleep apnea syndrome    • Overweight    • Patient noncompliance     with other medical treatment and regimen    • Patient noncompliance, general    • Presbyopia    • Sacroiliac joint pain    • Sensorineural hearing loss of both ears    • Skin tag    • Type 2 diabetes mellitus       PAST SURGICAL HISTORY     Past Surgical History:   Procedure Laterality Date   • CARDIAC CATHETERIZATION N/A 10/9/2018    Procedure: Left Heart Cath;  Surgeon: Lit Rodriguez MD;  Location: [facility] INVASIVE LOCATION;  Service: Cardiology   • CARDIAC CATHETERIZATION N/A 6/18/2019    Procedure: Left Heart Cath;  Surgeon: Lit Rodriguez MD;  Location: [facility] INVASIVE  LOCATION;  Service: Cardiology   • CORONARY ARTERY BYPASS GRAFT  2005    CABG (1) - LIMA to LAD, SVG to D1, SVG to OM,SVG to PDA.   • INJECTION OF MEDICATION  10/01/2015    Kenalog (1) - OBI Nguyễn   • OTHER SURGICAL HISTORY      Coronary artery stent placement (1)   • OTHER SURGICAL HISTORY  2012    Remove Impacted Cerumen 27794 (1) - OLYA Hammond   • OTHER SURGICAL HISTORY  2014    Skin Tag Removal (Each Additional 10 67895 (1) - OBI Nguyễn   • OTHER SURGICAL HISTORY  10/01/2015    Nebulizer Treatment 49939 (1) - OBI Nguyễn      SOCIAL HISTORY     Social History     Socioeconomic History   • Marital status:    Tobacco Use   • Smoking status: Former     Packs/day: 1.00     Years: 31.00     Pack years: 31.00     Types: Cigarettes     Start date:      Quit date:      Years since quittin.3     Passive exposure: Past   • Smokeless tobacco: Never   • Tobacco comments:     Quit around    Substance and Sexual Activity   • Alcohol use: No   • Drug use: No   • Sexual activity: Never      ALLERGIES   Patient has no known allergies.   MEDICATIONS     Current Outpatient Medications   Medication Sig Dispense Refill   • albuterol sulfate  (90 Base) MCG/ACT inhaler Inhale 2 puffs Every 4 (Four) Hours As Needed for Wheezing (cough). 18 g 0   • Alcohol Swabs 70 % pads 1 each 2 (Two) Times a Day. 100 each 11   • aspirin 81 MG chewable tablet Chew 1 tablet Every Night.     • atorvastatin (LIPITOR) 40 MG tablet TAKE 1 TABLET BY MOUTH EVERY DAY 90 tablet 1   • azithromycin (Zithromax) 250 MG tablet Take 2 tablets the first day, then 1 tablet daily for 4 days. 6 tablet 0   • B-D UF III MINI PEN NEEDLES 31G X 5 MM misc USE FOUR TIMES DAILY 120 each 11   • brompheniramine-pseudoephedrine-DM 30-2-10 MG/5ML syrup Take 5 mL by mouth 4 (Four) Times a Day As Needed for Allergies. 240 mL 0   • carvedilol (COREG) 3.125 MG tablet TAKE 1 TABLET BY MOUTH TWICE A DAY WITH MEALS 180 tablet 2   • cetirizine (zyrTEC)  10 MG tablet Take 1 tablet by mouth Daily. 30 tablet 11   • clopidogrel (PLAVIX) 75 MG tablet Take 1 tablet by mouth Daily. 90 tablet 3   • Continuous Blood Gluc  (FreeStyle Britta 2 [place]) device 1 each Take As Directed. 28 each 11   • Continuous Blood Gluc  (FreeStyle Britta 2 [place]) device 1 each Daily. URGENT 1 each 11   • Continuous Blood Gluc Sensor (FreeStyle Britta 14 Day Sensor) misc Inject 1 each under the skin into the appropriate area as directed Every 14 (Fourteen) Days. 13 each 3   • Continuous Blood Gluc Sensor (FreeStyle Britta 2 Sensor) misc USE AS DIRECTED. 2 each 11   • Dextran 70-Hypromellose (Artificial Tears) 0.1-0.3 % solution Apply 1 drop to eye(s) as directed by provider As Needed (DRY EYES). TO PHARMACIST:  ANY ARTIFICIAL TEARS IS FINE, I HAVE ABOUT 100 TO CHOOSE FROM IN Epic. THANKS. 30 mL 11   • fluticasone (FLONASE) 50 MCG/ACT nasal spray SPRAY 2 SPRAYS INTO THE NOSTRIL AS DIRECTED BY PROVIDER DAILY. 48 mL 3   • furosemide (LASIX) 20 MG tablet      • glucose blood test strip      • hydrOXYzine pamoate (VISTARIL) 25 MG capsule TAKE 1 CAPSULE BY MOUTH EVERY 6 HOURS AS NEEDED FOR ITCHING. 60 capsule 1   • insulin aspart (NovoLOG FlexPen) 100 UNIT/ML solution pen-injector sc pen Inject 40 Units under the skin into the appropriate area as directed 3 (Three) Times a Day With Meals. 45 mL 11   • insulin detemir (Levemir FlexTouch) 100 UNIT/ML injection Inject 24 Units under the skin into the appropriate area as directed Daily. 15 mL 1   • latanoprost (XALATAN) 0.005 % ophthalmic solution Administer 1 drop to both eyes Daily.     • levothyroxine (SYNTHROID, LEVOTHROID) 150 MCG tablet TAKE 1 TABLET BY MOUTH EVERY DAY BEFORE BREAKFAST 90 tablet 1   • Lidocaine Viscous HCl (XYLOCAINE) 2 % solution Take 5 mL by mouth As Needed for Mild Pain. 100 mL 0   • lisinopril (PRINIVIL,ZESTRIL) 20 MG tablet Take 1 tablet by mouth Daily. 90 tablet 3   • naproxen (Naprosyn) 500 MG tablet Take 1  tablet by mouth 2 (Two) Times a Day With Meals. 60 tablet 0   • nitroglycerin (NITROSTAT) 0.4 MG SL tablet DISSOLVE 1 TABLET BY MOUTH UNDER THE TONGUE AS NEEDED FOR CHEST PAIN, DO NOT EXCEED MORE THAN 3 DOSES IN 15 MINUTES 25 tablet 11   • nystatin (MYCOSTATIN) 100,000 unit/mL suspension Swish and swallow 5 mL 4 (Four) Times a Day. Swish for 30 seconds then swallow 240 mL 0   • ONETOUCH DELICA LANCETS FINE misc USE THREE TIMES DAILY TO TEST 100 each 3   • OneTouch Ultra test strip USE THREE TIMES DAILY 100 each 11   • permethrin (ELIMITE) 5 % cream Apply to body starting above most superior rash, apply down to toes covering all skin.  Leave on 12 hours, wash off in tub or shower. 1 each 5   • Semaglutide, 1 MG/DOSE, (Ozempic, 1 MG/DOSE,) 4 MG/3ML solution pen-injector Inject 0.25 mg under the skin into the appropriate area as directed 1 (One) Time Per Week. 3 mL 3   • tiZANidine (ZANAFLEX) 4 MG tablet Take 1 tablet by mouth Every 6 (Six) Hours As Needed for Muscle Spasms. 60 tablet 11     No current facility-administered medications for this visit.        The following portions of the patient's history were reviewed and updated as appropriate: allergies, current medications, past family history, past medical history, past social history, past surgical history and problem list.        Assessment & Plan   Diagnoses and all orders for this visit:    1. Type 2 diabetes mellitus with hyperglycemia, with long-term current use of insulin (Primary)  -     Hemoglobin A1c  -     Protein / Creatinine Ratio, Urine - Urine, Clean Catch    2. Acquired hypothyroidism  -     TSH    3. Primary hypertension  -     CBC (No Diff)  -     Comprehensive Metabolic Panel  -     Lipid Panel    he hasn't had labs in some time, will get some labs to check his diabetes and thyroid     Stop furosemide  Cut lisinopril 20mg in half and take 10mg qd.   bp check in 1-2 weeks.     Stay on coreg                    No follow-ups on  file.                  This document has been electronically signed by Chon Nguyễn MD on April 17, 2023 13:01 CDT

## 2023-04-18 LAB
ALBUMIN SERPL-MCNC: 3.9 G/DL (ref 3.5–5.2)
ALBUMIN/GLOB SERPL: 1.6 G/DL
ALP SERPL-CCNC: 59 U/L (ref 39–117)
ALT SERPL W P-5'-P-CCNC: 25 U/L (ref 1–41)
ANION GAP SERPL CALCULATED.3IONS-SCNC: 11.8 MMOL/L (ref 5–15)
AST SERPL-CCNC: 14 U/L (ref 1–40)
BILIRUB SERPL-MCNC: 0.4 MG/DL (ref 0–1.2)
BUN SERPL-MCNC: 15 MG/DL (ref 8–23)
BUN/CREAT SERPL: 14.4 (ref 7–25)
CALCIUM SPEC-SCNC: 9.4 MG/DL (ref 8.6–10.5)
CHLORIDE SERPL-SCNC: 99 MMOL/L (ref 98–107)
CHOLEST SERPL-MCNC: 150 MG/DL (ref 0–200)
CO2 SERPL-SCNC: 25.2 MMOL/L (ref 22–29)
CREAT SERPL-MCNC: 1.04 MG/DL (ref 0.76–1.27)
CREAT UR-MCNC: 171.4 MG/DL
DEPRECATED RDW RBC AUTO: 49.1 FL (ref 37–54)
EGFRCR SERPLBLD CKD-EPI 2021: 79.2 ML/MIN/1.73
ERYTHROCYTE [DISTWIDTH] IN BLOOD BY AUTOMATED COUNT: 16.1 % (ref 12.3–15.4)
GLOBULIN UR ELPH-MCNC: 2.4 GM/DL
GLUCOSE SERPL-MCNC: 153 MG/DL (ref 65–99)
HBA1C MFR BLD: 7.9 % (ref 4.8–5.6)
HCT VFR BLD AUTO: 40.7 % (ref 37.5–51)
HDLC SERPL-MCNC: 41 MG/DL (ref 40–60)
HGB BLD-MCNC: 13.8 G/DL (ref 13–17.7)
LDLC SERPL CALC-MCNC: 88 MG/DL (ref 0–100)
LDLC/HDLC SERPL: 2.08 {RATIO}
MCH RBC QN AUTO: 28.6 PG (ref 26.6–33)
MCHC RBC AUTO-ENTMCNC: 33.9 G/DL (ref 31.5–35.7)
MCV RBC AUTO: 84.4 FL (ref 79–97)
PLATELET # BLD AUTO: 217 10*3/MM3 (ref 140–450)
PMV BLD AUTO: 11.3 FL (ref 6–12)
POTASSIUM SERPL-SCNC: 5.1 MMOL/L (ref 3.5–5.2)
PROT ?TM UR-MCNC: 12.7 MG/DL
PROT SERPL-MCNC: 6.3 G/DL (ref 6–8.5)
PROT/CREAT UR: 74.1 MG/G CREA (ref 0–200)
RBC # BLD AUTO: 4.82 10*6/MM3 (ref 4.14–5.8)
SODIUM SERPL-SCNC: 136 MMOL/L (ref 136–145)
TRIGL SERPL-MCNC: 118 MG/DL (ref 0–150)
TSH SERPL DL<=0.05 MIU/L-ACNC: 5.26 UIU/ML (ref 0.27–4.2)
VLDLC SERPL-MCNC: 21 MG/DL (ref 5–40)
WBC NRBC COR # BLD: 9.07 10*3/MM3 (ref 3.4–10.8)

## 2023-04-26 ENCOUNTER — TELEPHONE (OUTPATIENT)
Dept: FAMILY MEDICINE CLINIC | Facility: CLINIC | Age: 68
End: 2023-04-26
Payer: MEDICARE

## 2023-04-26 RX ORDER — LISINOPRIL 5 MG/1
5 TABLET ORAL DAILY
Qty: 90 TABLET | Refills: 0 | Status: SHIPPED | OUTPATIENT
Start: 2023-04-26

## 2023-04-26 NOTE — TELEPHONE ENCOUNTER
Patient aware that Dr. Nguyễn changed his Lisinopril to 5mg and he was told to come by in 2 wks and have b/p checked.

## 2023-04-26 NOTE — TELEPHONE ENCOUNTER
Mr. Pina called and said that he has been losing wait and he is taking Lisinopril 20 mg and it was to much so you told him to cut it in half, but he feels like that is still to much.  He thinks he may need 5 mg tabs.  [place] - 90 day supply if possible if you change it.  Mr. Pina said that when he takes it and is not up and around his b/p drops but if he is up moving around then it is a little high.

## 2023-05-10 ENCOUNTER — TELEPHONE (OUTPATIENT)
Dept: ENDOCRINOLOGY | Facility: CLINIC | Age: 68
End: 2023-05-10
Payer: MEDICARE

## 2023-05-21 DIAGNOSIS — E11.69 MIXED DIABETIC HYPERLIPIDEMIA ASSOCIATED WITH TYPE 2 DIABETES MELLITUS: ICD-10-CM

## 2023-05-21 DIAGNOSIS — E11.59 HYPERTENSION ASSOCIATED WITH DIABETES: ICD-10-CM

## 2023-05-21 DIAGNOSIS — E11.8 TYPE 2 DIABETES MELLITUS WITH COMPLICATION: ICD-10-CM

## 2023-05-21 DIAGNOSIS — I15.2 HYPERTENSION ASSOCIATED WITH DIABETES: ICD-10-CM

## 2023-05-21 DIAGNOSIS — E78.2 MIXED DIABETIC HYPERLIPIDEMIA ASSOCIATED WITH TYPE 2 DIABETES MELLITUS: ICD-10-CM

## 2023-05-21 DIAGNOSIS — E55.9 VITAMIN D DEFICIENCY: ICD-10-CM

## 2023-05-22 RX ORDER — FLURBIPROFEN SODIUM 0.3 MG/ML
SOLUTION/ DROPS OPHTHALMIC
Qty: 120 EACH | Refills: 11 | Status: SHIPPED | OUTPATIENT
Start: 2023-05-22

## 2023-07-28 ENCOUNTER — DOCUMENTATION (OUTPATIENT)
Dept: ENDOCRINOLOGY | Facility: CLINIC | Age: 68
End: 2023-07-28
Payer: MEDICARE

## 2023-07-28 ENCOUNTER — OFFICE VISIT (OUTPATIENT)
Dept: ENDOCRINOLOGY | Facility: CLINIC | Age: 68
End: 2023-07-28
Payer: MEDICARE

## 2023-07-28 VITALS
DIASTOLIC BLOOD PRESSURE: 66 MMHG | OXYGEN SATURATION: 97 % | BODY MASS INDEX: 36.18 KG/M2 | WEIGHT: 252.7 LBS | SYSTOLIC BLOOD PRESSURE: 126 MMHG | HEIGHT: 70 IN | HEART RATE: 55 BPM

## 2023-07-28 DIAGNOSIS — Z79.4 TYPE 2 DIABETES MELLITUS WITH HYPERGLYCEMIA, WITH LONG-TERM CURRENT USE OF INSULIN: Primary | ICD-10-CM

## 2023-07-28 DIAGNOSIS — Z79.4 TYPE 2 DIABETES MELLITUS WITH HYPERGLYCEMIA, WITH LONG-TERM CURRENT USE OF INSULIN: ICD-10-CM

## 2023-07-28 DIAGNOSIS — E03.9 ACQUIRED HYPOTHYROIDISM: ICD-10-CM

## 2023-07-28 DIAGNOSIS — I10 PRIMARY HYPERTENSION: ICD-10-CM

## 2023-07-28 DIAGNOSIS — E11.65 TYPE 2 DIABETES MELLITUS WITH HYPERGLYCEMIA, WITH LONG-TERM CURRENT USE OF INSULIN: Primary | ICD-10-CM

## 2023-07-28 DIAGNOSIS — E55.9 VITAMIN D DEFICIENCY, UNSPECIFIED: ICD-10-CM

## 2023-07-28 DIAGNOSIS — E78.2 MIXED HYPERLIPIDEMIA: ICD-10-CM

## 2023-07-28 DIAGNOSIS — E11.65 TYPE 2 DIABETES MELLITUS WITH HYPERGLYCEMIA, WITH LONG-TERM CURRENT USE OF INSULIN: ICD-10-CM

## 2023-07-28 RX ORDER — SEMAGLUTIDE 1.34 MG/ML
1 INJECTION, SOLUTION SUBCUTANEOUS WEEKLY
Qty: 3 ML | Refills: 3 | Status: SHIPPED | OUTPATIENT
Start: 2023-07-28

## 2023-07-28 NOTE — PROGRESS NOTES
PT BROUGHT IN PATIENT ASSISTANCE FORMS FOR US TO FILL OUT BUT REQUESTED THEM BACK TO MAIL THEM IN. FORMS WERE FILLED OUT AND GIVEN BACK TO THE PATIENT WITH A PRESCRIPTION FOR OZEMPIC FOR HIM TO MAIL.

## 2023-07-28 NOTE — PROGRESS NOTES
"Chief Complaint  Diabetes mellitus type 2       Subjective          Pablo Pina presents to [facility] ENDOCRINOLOGY  Diabetes       In office visit           Primary Care Provider     Chon Nguyễn MD    67 year old male presents for follow up     Diabetes mellitus type 2     Duration -2010      Timing constant     Quality not controlled     Severity high     Complications neuropathy            Current diabetes regimen    Insulin, GLP-1             Current glucose monitoring system              Checks 4 times daily        Britta personal use        See below       No low sugars           Review of Systems - General ROS: negative            Objective   Vital Signs:   /66   Pulse 55   Ht 177.8 cm (70\")   Wt 115 kg (252 lb 11.2 oz)   SpO2 97%   BMI 36.26 kg/m²     Physical Exam  Neurological:      General: No focal deficit present.      Mental Status: He is alert.   Psychiatric:         Mood and Affect: Mood normal.         Thought Content: Thought content normal.         Judgment: Judgment normal.      Result Review :   The following data was reviewed by: COLLEEN Carcamo on 06/03/2022:  Common labs          8/29/2022    10:22 4/17/2023    11:37   Common Labs   Glucose  153    BUN  15    Creatinine  1.04    Sodium  136    Potassium  5.1    Chloride  99    Calcium  9.4    Albumin  3.9    Total Bilirubin  0.4    Alkaline Phosphatase  59    AST (SGOT)  14    ALT (SGPT)  25    WBC  9.07    Hemoglobin  13.8    Hematocrit  40.7    Platelets  217    Total Cholesterol  150    Triglycerides  118    HDL Cholesterol  41    LDL Cholesterol   88    Hemoglobin A1C  7.90    PSA 0.364                 Assessment and Plan    Diagnoses and all orders for this visit:    1. Type 2 diabetes mellitus with hyperglycemia, with long-term current use of insulin (Primary)    2. Acquired hypothyroidism    3. Mixed hyperlipidemia    4. Primary hypertension    5. Vitamin D deficiency, " unspecified            Glycemic management     Diabetes mellitus type 2          Britta personal use -      Ambulatory Glucose Profile Report    Days Analyzed : 2 week period ending on 07/28/23      Continuous Glucose Monitory Device:  FreeStyle Britta 2     3% very high target range  38% high target range  59% in target range  0% below target range  GMI 7.5 %  Average glucose 175 mg/dl    Interpretation : Diabetes Type 2 Uncontrolled        hyperglycemia at night     Misses the levemir          restart Ozempic 0.25 mg -- will increase to 0.5                Metformin stopped due to diarrhea            Levemir  taking  24 units            Taking Humalog 20 up to 30 units 3 times a day with meals     He is adjusting his insulin based on CGM               Lipid Management     Taking Lipitor 40 mg 1 at night        Blood Pressure Management:          Taking coreg 3.125 mg BID         Microvascular Complication Monitoring:       Last eye exam --  May 2022, no DR      Cataract bilateral  -----------     Last Microalbumin-Proteinuria Assessment     Lab Results   Component Value Date    MICROALBUR 1.3 11/01/2021            Neuropathy yes  -----------                Bone Health     Vitamin D deficiency     Taking vitamin D     Thyroid Health     Hypothyroidism taking levothyroxine 150 mcg daily     Lab Results   Component Value Date    TSH 5.260 (H) 04/17/2023             Other Diabetes Related Aspects                   Lab Results   Component Value Date     [id number] 389 05/05/2021                                Follow Up   Return in about 3 months (around 10/28/2023) for Recheck.  Patient was given instructions and counseling regarding his condition or for health maintenance advice. Please see specific information pulled into the AVS if appropriate.         This document has been electronically signed by COLLEEN Carcamo on July 28, 2023 09:52 CDT.

## 2023-08-14 RX ORDER — NITROGLYCERIN 0.4 MG/1
TABLET SUBLINGUAL
Qty: 50 TABLET | Refills: 5 | Status: SHIPPED | OUTPATIENT
Start: 2023-08-14

## 2023-08-14 NOTE — TELEPHONE ENCOUNTER
Called patient as reminder of procedure tomorrow. Arrive at 8:30am for 10:30am procedure. Npo after midnight.  [facility] Refill Request

## 2023-09-05 ENCOUNTER — TELEPHONE (OUTPATIENT)
Dept: ENDOCRINOLOGY | Facility: CLINIC | Age: 68
End: 2023-09-05
Payer: MEDICARE

## 2023-09-05 NOTE — TELEPHONE ENCOUNTER
Mr. Farooqald Himanshu called and said that Ozempic was supposed to be sending his supplies to here and I looked and did not see it.  He said this was about 10 days ago.  Said he was supposed to pick it up from here.    Also would like to speak about the Freestyle dariel  3 new one that is coming out.    Pt call back [phone]

## 2023-09-07 ENCOUNTER — DOCUMENTATION (OUTPATIENT)
Dept: ENDOCRINOLOGY | Facility: CLINIC | Age: 68
End: 2023-09-07
Payer: MEDICARE

## 2023-09-11 ENCOUNTER — TELEPHONE (OUTPATIENT)
Dept: ENDOCRINOLOGY | Facility: CLINIC | Age: 68
End: 2023-09-11
Payer: MEDICARE

## 2023-09-11 DIAGNOSIS — Z79.4 TYPE 2 DIABETES MELLITUS WITH HYPERGLYCEMIA, WITH LONG-TERM CURRENT USE OF INSULIN: Primary | ICD-10-CM

## 2023-09-11 DIAGNOSIS — E11.65 TYPE 2 DIABETES MELLITUS WITH HYPERGLYCEMIA, WITH LONG-TERM CURRENT USE OF INSULIN: Primary | ICD-10-CM

## 2023-09-11 RX ORDER — BLOOD-GLUCOSE SENSOR
1 EACH MISCELLANEOUS
Qty: 2 EACH | Refills: 2 | Status: SHIPPED | OUTPATIENT
Start: 2023-09-11

## 2023-09-11 NOTE — TELEPHONE ENCOUNTER
Mr. Pablo Pina called and said that he was needing the Freestyle Britta 3 not the 2.  Can the other prescription be canceled and new one be put in.    Pt call back [phone]

## 2023-09-11 NOTE — TELEPHONE ENCOUNTER
Pablo Silvana wanted to know if Billy could call in a prescription for FreeStyle Britta,sensor and reader.    Pt uses [facility]    Pt call back [phone]

## 2023-09-12 ENCOUNTER — TELEPHONE (OUTPATIENT)
Dept: FAMILY MEDICINE CLINIC | Facility: CLINIC | Age: 68
End: 2023-09-12
Payer: MEDICARE

## 2023-09-12 RX ORDER — CLOPIDOGREL BISULFATE 75 MG/1
TABLET ORAL
Qty: 90 TABLET | Refills: 3 | Status: SHIPPED | OUTPATIENT
Start: 2023-09-12

## 2023-09-12 RX ORDER — BROMPHENIRAMINE MALEATE, PSEUDOEPHEDRINE HYDROCHLORIDE, AND DEXTROMETHORPHAN HYDROBROMIDE 2; 30; 10 MG/5ML; MG/5ML; MG/5ML
5 SYRUP ORAL 4 TIMES DAILY PRN
Qty: 240 ML | Refills: 0 | Status: SHIPPED | OUTPATIENT
Start: 2023-09-12

## 2023-09-18 ENCOUNTER — TELEPHONE (OUTPATIENT)
Dept: FAMILY MEDICINE CLINIC | Facility: CLINIC | Age: 68
End: 2023-09-18
Payer: MEDICARE

## 2023-09-18 ENCOUNTER — CLINICAL SUPPORT (OUTPATIENT)
Dept: FAMILY MEDICINE CLINIC | Facility: CLINIC | Age: 68
End: 2023-09-18
Payer: MEDICARE

## 2023-09-18 DIAGNOSIS — R05.1 ACUTE COUGH: Primary | ICD-10-CM

## 2023-09-18 PROCEDURE — 96372 THER/PROPH/DIAG INJ SC/IM: CPT | Performed by: FAMILY MEDICINE

## 2023-09-18 RX ORDER — TRIAMCINOLONE ACETONIDE 40 MG/ML
80 INJECTION, SUSPENSION INTRA-ARTICULAR; INTRAMUSCULAR ONCE
Status: COMPLETED | OUTPATIENT
Start: 2023-09-18 | End: 2023-09-18

## 2023-09-18 RX ADMIN — TRIAMCINOLONE ACETONIDE 80 MG: 40 INJECTION, SUSPENSION INTRA-ARTICULAR; INTRAMUSCULAR at 11:05

## 2023-09-18 NOTE — PROGRESS NOTES
Injection  Injection performed in    by Malina Hartmann MA. Patient tolerated the procedure well without complications.  09/18/23   Malina Hartmann MA

## 2023-09-18 NOTE — TELEPHONE ENCOUNTER
Mr. Pina wanted to know if he can come in for a steroid shot:  He is having runny nose, HA, and cough.

## (undated) DEVICE — MODEL AT P65, P/N 701554-001KIT CONTENTS: HAND CONTROLLER, 3-WAY HIGH-PRESSURE STOPCOCK WITH ROTATING END AND PREMIUM HIGH-PRESSURE TUBING: Brand: ANGIOTOUCH® KIT

## (undated) DEVICE — DEV INFL MONARCH 20ML

## (undated) DEVICE — Device

## (undated) DEVICE — MODEL BT2000 P/N 700287-012KIT CONTENTS: MANIFOLD WITH SALINE AND CONTRAST PORTS, SALINE TUBING WITH SPIKE AND HAND SYRINGE, TRANSDUCER: Brand: BT2000 AUTOMATED MANIFOLD KIT

## (undated) DEVICE — GW PERIPH GUIDERIGHT STD/J/TP PTFE/PCOAT SS 0.038IN 5X150CM

## (undated) DEVICE — COPILOT KIT INCLUDES BLEEDBACK CONTROL VALVE / GUIDE WIRE INTRODUCER / TORQUE DEVICE: Brand: ACCESSORIES

## (undated) DEVICE — INTRO SHEATH ART/FEM ENGAGE .038 6F12CM

## (undated) DEVICE — PK CATH LAB 60

## (undated) DEVICE — TREK CORONARY DILATATION CATHETER 2.50 MM X 15 MM / RAPID-EXCHANGE: Brand: TREK

## (undated) DEVICE — KT INTRO MINISTICK MAX W/GW NITNL/TUNG ECHO 4F 21G 7CM

## (undated) DEVICE — A2000 MULTI-USE SYRINGE KIT, P/N 701277-003KIT CONTENTS: 100ML CONTRAST RESERVOIR AND TUBING WITH CONTRAST SPIKE AND CLAMP: Brand: A2000 MULTI-USE SYRINGE KIT

## (undated) DEVICE — ELECTRODE,RT,STRESS,FOAM,50PK: Brand: MEDLINE

## (undated) DEVICE — PERCLOSE PROGLIDE™ SUTURE-MEDIATED CLOSURE SYSTEM: Brand: PERCLOSE PROGLIDE™

## (undated) DEVICE — GW CHOICE PT J 300CM

## (undated) DEVICE — CATH DIAG EXPO M/ PK 6FR FL4/FR4 PIG 3PK

## (undated) DEVICE — ANGIO-SEAL EVOLUTION VASCULAR CLOSURE DEVICE: Brand: ANGIO-SEAL

## (undated) DEVICE — CATH DIAG EXPO .045 FL4 5F 100CM

## (undated) DEVICE — TREK CORONARY DILATATION CATHETER 3.50 MM X 12 MM / RAPID-EXCHANGE: Brand: TREK

## (undated) DEVICE — CATH GUIDE LAUNCHER JR4.0 6F 100CM